# Patient Record
Sex: FEMALE | Race: WHITE | NOT HISPANIC OR LATINO | Employment: FULL TIME | URBAN - METROPOLITAN AREA
[De-identification: names, ages, dates, MRNs, and addresses within clinical notes are randomized per-mention and may not be internally consistent; named-entity substitution may affect disease eponyms.]

---

## 2017-01-05 ENCOUNTER — OFFICE VISIT (OUTPATIENT)
Dept: FAMILY MEDICINE | Facility: CLINIC | Age: 42
End: 2017-01-05
Payer: COMMERCIAL

## 2017-01-05 VITALS
HEART RATE: 103 BPM | DIASTOLIC BLOOD PRESSURE: 88 MMHG | TEMPERATURE: 99 F | BODY MASS INDEX: 30.88 KG/M2 | WEIGHT: 196.75 LBS | HEIGHT: 67 IN | OXYGEN SATURATION: 95 % | SYSTOLIC BLOOD PRESSURE: 120 MMHG

## 2017-01-05 DIAGNOSIS — H69.93 EUSTACHIAN TUBE DYSFUNCTION, BILATERAL: ICD-10-CM

## 2017-01-05 DIAGNOSIS — H72.91 RUPTURED TYMPANIC MEMBRANE, RIGHT: Primary | ICD-10-CM

## 2017-01-05 PROCEDURE — 1159F MED LIST DOCD IN RCRD: CPT | Mod: S$GLB,,, | Performed by: NURSE PRACTITIONER

## 2017-01-05 PROCEDURE — 99999 PR PBB SHADOW E&M-EST. PATIENT-LVL III: CPT | Mod: PBBFAC,,, | Performed by: NURSE PRACTITIONER

## 2017-01-05 PROCEDURE — 99213 OFFICE O/P EST LOW 20 MIN: CPT | Mod: S$GLB,,, | Performed by: NURSE PRACTITIONER

## 2017-01-05 RX ORDER — METHYLPREDNISOLONE 4 MG/1
TABLET ORAL
Qty: 21 TABLET | Refills: 0 | Status: SHIPPED | OUTPATIENT
Start: 2017-01-05 | End: 2017-01-20 | Stop reason: ALTCHOICE

## 2017-01-05 NOTE — MR AVS SNAPSHOT
[facility]  [address]  [place] [address]  Phone: [phone]  Fax: [phone]                  Ivory Cade   2017 1:40 PM   Office Visit    Description:  Female : 1975   Provider:  Claire Serra NP   Department:  [facility]           Reason for Visit     Ear Injury           Diagnoses this Visit        Comments    Ruptured tympanic membrane, right    -  Primary     Eustachian tube dysfunction, bilateral                To Do List           Future Appointments        Provider Department Dept Phone    2017 10:00 AM AUDIO, DAHIANA [place] - Audiology [phone]    2017 11:00 AM Lena Graf NP [facility] [phone]    2017 1:40 PM Elaine Babb MD [facility] [phone]      Goals (5 Years of Data)     None       These Medications        Disp Refills Start End    methylPREDNISolone (MEDROL DOSEPACK) 4 mg tablet 21 tablet 0 2017     Take as directed    Pharmacy: [facility]/pharmacy #[id number] - [place], [place] - [address] Ph #: [phone]         [facility]     [facility] Nurse Care Line -  Assistance  Registered nurses in the [facility] Center provide clinical advisement, health education, appointment booking, and other advisory services.  Call for this free service at [phone].             Medications           Message regarding Medications     Verify the changes and/or additions to your medication regime listed below are the same as discussed with your clinician today.  If any of these changes or additions are incorrect, please notify your healthcare provider.        START taking these NEW medications        Refills    methylPREDNISolone (MEDROL DOSEPACK) 4 mg tablet 0    Sig: Take as directed    Class: Normal      STOP taking these medications     hydrocodone-acetaminophen 5-325mg (NORCO) 5-325 mg per tablet Take 1 tablet by mouth every 6 (six) hours as  "needed for Pain.    phentermine (ADIPEX-P) 37.5 mg tablet Take 18.75 mg by mouth 2 (two) times daily as needed.    senna-docusate 8.6-50 mg (PERICOLACE) 8.6-50 mg per tablet Take 1 tablet by mouth 2 (two) times daily.    acyclovir 5% (ZOVIRAX) 5 % Crea Apply topically 5 (five) times daily.           Verify that the below list of medications is an accurate representation of the medications you are currently taking.  If none reported, the list may be blank. If incorrect, please contact your healthcare provider. Carry this list with you in case of emergency.           Current Medications     albuterol 90 mcg/actuation inhaler Inhale 2 puffs into the lungs every 6 (six) hours as needed for Wheezing.    beclomethasone (QVAR) 80 mcg/actuation Aero Inhale 2 puffs into the lungs 2 (two) times daily.    citalopram (CELEXA) 40 MG tablet TAKE 1 TABLET (40 MG TOTAL) BY MOUTH ONCE DAILY.    methylPREDNISolone (MEDROL DOSEPACK) 4 mg tablet Take as directed           Clinical Reference Information           Vital Signs - Last Recorded  Most recent update: 1/5/2017  1:49 PM by Jacklyn Frances    BP Pulse Temp Ht Wt LMP    120/88 (BP Location: Left arm, Patient Position: Sitting, BP Method: Manual) 103 98.6 °F (37 °C) (Oral) 5' 7" (1.702 m) 89.3 kg (196 lb 12.2 oz) 11/10/2016    SpO2 BMI             95% 30.82 kg/m2         Blood Pressure          Most Recent Value    BP  120/88      Allergies as of 1/5/2017     Penicillins      Immunizations Administered on Date of Encounter - 1/5/2017     None      Orders Placed During Today's Visit      Normal Orders This Visit    Ambulatory referral to ENT       Smoking Cessation     If you would like to quit smoking:   You may be eligible for free services if you are a [place] resident and started smoking cigarettes before September 1, 1988.  Call the Smoking Cessation Trust (SCT) toll free at ([phone] or [phone].   Call 5-147-QUIT-NOW if you do not meet the above criteria.       "

## 2017-01-05 NOTE — PROGRESS NOTES
Subjective:       Patient ID: Ivory Cade is a 41 y.o. female.    Chief Complaint: Ear Injury    HPI     Patient presents to clinic for f/u of a right, ruptured tympanic membrane. Sx occurred at flying while she was experiencing URI complaints. She was evaluated at a local  on 12/18/2016 and told that she had a ruptured tympanic membrane and to f/u with ENT. She was also placed Cefdinir for sinusitis. She feels as though her sx have waxed and waned since completing the abx. Still notes sinus pressure, headache, cough and chest congestion. Reports subjective feelings of fever, although her temperature has been within normal ranges.     Review of Systems   Constitutional: Negative for activity change, chills, fever and unexpected weight change.   HENT: Positive for ear discharge (scant - clear/white) and ear pain (right - mild). Negative for hearing loss, rhinorrhea and trouble swallowing.    Eyes: Negative for discharge and visual disturbance.   Respiratory: Positive for cough (occasionally productive with green sputum .). Negative for chest tightness and wheezing.    Cardiovascular: Negative for chest pain and palpitations.   Gastrointestinal: Negative for blood in stool, constipation, diarrhea and vomiting.   Endocrine: Negative for polydipsia and polyuria.   Genitourinary: Negative for difficulty urinating, dysuria, hematuria and menstrual problem.   Musculoskeletal: Negative for arthralgias and joint swelling.   Neurological: Positive for headaches. Negative for weakness.   Psychiatric/Behavioral: Negative for confusion and dysphoric mood.       Objective:      Physical Exam   Constitutional: She is oriented to person, place, and time. She appears well-developed and well-nourished.   HENT:   Head: Normocephalic and atraumatic.   Right Ear: Tympanic membrane is perforated. Tympanic membrane is not injected, not scarred and not erythematous. A middle ear effusion is present.   Left Ear: Tympanic membrane is  not erythematous. A middle ear effusion is present.   Nose: Mucosal edema and rhinorrhea present. Right sinus exhibits no maxillary sinus tenderness and no frontal sinus tenderness. Left sinus exhibits no maxillary sinus tenderness and no frontal sinus tenderness.   Mouth/Throat: Uvula is midline and mucous membranes are normal. No oropharyngeal exudate or posterior oropharyngeal erythema.   Eyes: Pupils are equal, round, and reactive to light. Right eye exhibits no discharge. Left eye exhibits no discharge.   Neck: Normal range of motion. Neck supple.   Cardiovascular: Normal rate, regular rhythm and normal heart sounds.    Pulmonary/Chest: Breath sounds normal. No respiratory distress. She has no wheezes. She has no rales.   Musculoskeletal: Normal range of motion. She exhibits no edema.   Neurological: She is alert and oriented to person, place, and time. No cranial nerve deficit. Coordination normal.   Skin: Skin is warm and dry. No rash noted. No erythema.   Psychiatric: She has a normal mood and affect. Her behavior is normal.   Nursing note and vitals reviewed.      Assessment:       1. Ruptured tympanic membrane, right    2. Eustachian tube dysfunction, bilateral        Plan:   Ivory was seen today for ear injury.    Diagnoses and all orders for this visit:    Ruptured tympanic membrane, right  -     Ambulatory referral to ENT  Healing well. No evidence of active infection.   F/u with ENT in 2-4 weeks with audiology eval.     Eustachian tube dysfunction, bilateral  -     methylPREDNISolone (MEDROL DOSEPACK) 4 mg tablet; Take as directed  Flonase and antihistamine. Oral steroids if sx worsen.

## 2017-01-10 ENCOUNTER — PATIENT MESSAGE (OUTPATIENT)
Dept: FAMILY MEDICINE | Facility: CLINIC | Age: 42
End: 2017-01-10

## 2017-01-10 RX ORDER — SULFAMETHOXAZOLE AND TRIMETHOPRIM 800; 160 MG/1; MG/1
1 TABLET ORAL 2 TIMES DAILY
Qty: 20 TABLET | Refills: 0 | Status: SHIPPED | OUTPATIENT
Start: 2017-01-10 | End: 2017-01-20 | Stop reason: ALTCHOICE

## 2017-01-10 RX ORDER — CIPROFLOXACIN AND DEXAMETHASONE 3; 1 MG/ML; MG/ML
4 SUSPENSION/ DROPS AURICULAR (OTIC) 2 TIMES DAILY
Qty: 7.5 ML | Refills: 0 | Status: SHIPPED | OUTPATIENT
Start: 2017-01-10 | End: 2017-01-20 | Stop reason: ALTCHOICE

## 2017-01-10 NOTE — TELEPHONE ENCOUNTER
Bactrim and ciprodex bid. Keep ear dry - do not submerge in water or rinse with other solutions. Ensure using flonase otc.

## 2017-01-10 NOTE — TELEPHONE ENCOUNTER
I came in last week and the NP and PA both looked at my ear and said it was still perforated. She didn't want to give me antibiotic if not necessary, but prescribed medrol dose pack if needed. I'm taking the steroids and am feeling worse. Just a low grade fever. More fluid draining from ear with perforation and very dizzy. I can't get in to see ENT and get audiology screening until the 20th. I didn't want to take anymore antibiotics, but now I'm thinking another round of antibiotics may be a good idea. I took Cefdinir x10 days 2 weeks ago. Let me know what I should do. Thanks in advance.

## 2017-01-11 ENCOUNTER — PATIENT OUTREACH (OUTPATIENT)
Dept: ADMINISTRATIVE | Facility: HOSPITAL | Age: 42
End: 2017-01-11

## 2017-01-11 DIAGNOSIS — J45.20 RAD (REACTIVE AIRWAY DISEASE), MILD INTERMITTENT, UNCOMPLICATED: ICD-10-CM

## 2017-01-11 RX ORDER — ALBUTEROL SULFATE 90 UG/1
2 AEROSOL, METERED RESPIRATORY (INHALATION) EVERY 6 HOURS PRN
Qty: 18 G | Refills: 5 | Status: SHIPPED | OUTPATIENT
Start: 2017-01-11 | End: 2019-01-16 | Stop reason: SDUPTHER

## 2017-01-20 ENCOUNTER — INITIAL CONSULT (OUTPATIENT)
Dept: OTOLARYNGOLOGY | Facility: CLINIC | Age: 42
End: 2017-01-20
Payer: COMMERCIAL

## 2017-01-20 ENCOUNTER — CLINICAL SUPPORT (OUTPATIENT)
Dept: AUDIOLOGY | Facility: CLINIC | Age: 42
End: 2017-01-20
Payer: COMMERCIAL

## 2017-01-20 VITALS
HEART RATE: 90 BPM | HEIGHT: 67 IN | SYSTOLIC BLOOD PRESSURE: 135 MMHG | BODY MASS INDEX: 31.76 KG/M2 | DIASTOLIC BLOOD PRESSURE: 84 MMHG | WEIGHT: 202.38 LBS

## 2017-01-20 DIAGNOSIS — H93.8X1 EAR FULLNESS, RIGHT: Primary | ICD-10-CM

## 2017-01-20 DIAGNOSIS — H93.13 TINNITUS, BILATERAL: ICD-10-CM

## 2017-01-20 PROCEDURE — 99999 PR PBB SHADOW E&M-EST. PATIENT-LVL III: CPT | Mod: PBBFAC,,, | Performed by: NURSE PRACTITIONER

## 2017-01-20 PROCEDURE — 99203 OFFICE O/P NEW LOW 30 MIN: CPT | Mod: S$GLB,,, | Performed by: NURSE PRACTITIONER

## 2017-01-20 PROCEDURE — 1159F MED LIST DOCD IN RCRD: CPT | Mod: S$GLB,,, | Performed by: NURSE PRACTITIONER

## 2017-01-20 PROCEDURE — 92567 TYMPANOMETRY: CPT | Mod: S$GLB,,, | Performed by: AUDIOLOGIST

## 2017-01-20 NOTE — PROGRESS NOTES
Tympanometry completed per order from Lena Graf NP.    Type A tympanogram obtained AS and Type Ad tympanogram obtained AD.     Patient referred back to nurse practitioner for follow-up evaluatuion.

## 2017-01-20 NOTE — MR AVS SNAPSHOT
[facility]  [address] [facility] [address]  [facility] [address]  Phone: [phone]  Fax: [phone]                  Ivoyr Cade   2017 11:00 AM   Initial consult    Description:  Female : 1975   Provider:  Lena Graf NP   Department:  [facility] - ENT           Reason for Visit     perforated ear drum     Fluid in ears     Ear Drainage                To Do List           Future Appointments        Provider Department Dept Phone    2017 1:40 PM Elaine Babb MD [facility] [phone]      Goals (5 Years of Data)     None      [facility]     [facility] On Call Nurse Care [facility] -  Assistance  Registered nurses in the [facility] On Call Center provide clinical advisement, health education, appointment booking, and other advisory services.  Call for this free service at [phone].             Medications           Message regarding Medications     Verify the changes and/or additions to your medication regime listed below are the same as discussed with your clinician today.  If any of these changes or additions are incorrect, please notify your healthcare provider.        STOP taking these medications     methylPREDNISolone (MEDROL DOSEPACK) 4 mg tablet Take as directed    sulfamethoxazole-trimethoprim 800-160mg (BACTRIM DS) 800-160 mg Tab Take 1 tablet by mouth 2 (two) times daily.    ciprofloxacin-dexamethasone 0.3-0.1% (CIPRODEX) 0.3-0.1 % DrpS Place 4 drops into the right ear 2 (two) times daily.           Verify that the below list of medications is an accurate representation of the medications you are currently taking.  If none reported, the list may be blank. If incorrect, please contact your healthcare provider. Carry this list with you in case of emergency.           Current Medications     albuterol 90 mcg/actuation inhaler Inhale 2 puffs into the lungs every 6 (six) hours as needed for Wheezing.    beclomethasone (QVAR) 80 mcg/actuation Aero Inhale 2  "puffs into the lungs 2 (two) times daily.    citalopram (CELEXA) 40 MG tablet TAKE 1 TABLET (40 MG TOTAL) BY MOUTH ONCE DAILY.           Clinical Reference Information           Vital Signs - Last Recorded  Most recent update: 1/20/2017 10:30 AM by Mary Stubbs    BP Pulse Ht Wt LMP BMI    135/84 90 5' 7" (1.702 m) 91.8 kg (202 lb 6.1 oz) 11/10/2016 31.7 kg/m2      Blood Pressure          Most Recent Value    BP  135/84      Allergies as of 1/20/2017     Penicillins      Immunizations Administered on Date of Encounter - 1/20/2017     None      Instructions    When flying:  Take Sudafed 60 mg 1 hour prior to flying. Afrin nasal spray 30 minutes before take-off. Chew gum/pop ears gently when taking off and coming down. Drink water. Use "Airplanes" plugs designed for flying.     DIFFERENT TYPES OF NASAL SPRAYS:  · Flonase (steroid spray) is best for stuffy, pressure, fullness.  · Astelin (antihistamine spray) is best for itchy, drippy, sneezy.    Nasal spray instructions:  Blow nose first gently to clean. Keep chin level with the floor (do not tilt head forward or back). Insert nasal spray taking caution to direct it AWAY from the middle wall inside the nose (septum) to avoid irritating nasal septum which could cause nosebleed.  Do not tilt spray up but rather flat and out along the roof of your mouth to spray. Angle the tip of the spray out slightly toward the direction of the ears; then spray. Do not take quick vigorous sniff but rather slow gentle inhalation while waiting for medication to absorb into nasal passages. Then administer second spray in same way.     EUSTACHIAN TUBE INSTRUCTIONS:  Nasal valsalva:  Pinch nose and with closed mouth try to "pop" air into ears through the back of the nose. Attempt this several times a day. The more popping you have, the more likely the fluid will resolve.     Ponaris Nasal Emollient is used for the relief of: nasal congestion due to colds, nasal irritation, allergy " exacerbations, nasal crusting. Specifically prepared iodized organic oils of pine, eucalyptus, peppermint, cajeput, and cottonseed. To order Ponaris: ask your pharmacist to order it for you or we carry it in our pharmacy downstairs on the first floor.          Smoking Cessation     If you would like to quit smoking:   You may be eligible for free services if you are a [place] resident and started smoking cigarettes before September 1, 1988.  Call the Smoking Cessation Trust (SCT) toll free at ([phone] or [phone].   Call 6-473-QUIT-NOW if you do not meet the above criteria.

## 2017-01-20 NOTE — PATIENT INSTRUCTIONS
"When flying:  Take Sudafed 60 mg 1 hour prior to flying. Afrin nasal spray 30 minutes before take-off. Chew gum/pop ears gently when taking off and coming down. Drink water. Use "Airplanes" plugs designed for flying.     DIFFERENT TYPES OF NASAL SPRAYS:  · Flonase (steroid spray) is best for stuffy, pressure, fullness.  · Astelin (antihistamine spray) is best for itchy, drippy, sneezy.    Nasal spray instructions:  Blow nose first gently to clean. Keep chin level with the floor (do not tilt head forward or back). Insert nasal spray taking caution to direct it AWAY from the middle wall inside the nose (septum) to avoid irritating nasal septum which could cause nosebleed.  Do not tilt spray up but rather flat and out along the roof of your mouth to spray. Angle the tip of the spray out slightly toward the direction of the ears; then spray. Do not take quick vigorous sniff but rather slow gentle inhalation while waiting for medication to absorb into nasal passages. Then administer second spray in same way.     EUSTACHIAN TUBE INSTRUCTIONS:  Nasal valsalva:  Pinch nose and with closed mouth try to "pop" air into ears through the back of the nose. Attempt this several times a day. The more popping you have, the more likely the fluid will resolve.     Ponaris Nasal Emollient is used for the relief of: nasal congestion due to colds, nasal irritation, allergy exacerbations, nasal crusting. Specifically prepared iodized organic oils of pine, eucalyptus, peppermint, cajeput, and cottonseed. To order Ponaris: ask your pharmacist to order it for you or we carry it in our pharmacy downstairs on the first floor.     "

## 2017-01-20 NOTE — PROGRESS NOTES
Subjective:       Patient ID: Ivory Cade is a 41 y.o. female.    Chief Complaint: perforated ear drum; Fluid in ears; and Ear Drainage    HPI   Patient developed a cold during December and had to fly while sick. Her right ear stopped up and would not clear. She went to [facility] where she was told possible right TM perforation. She was seen by KELSI Serra on 1/5/17 with bilateral TAZ and right TM perforation; given steroids. Patient reports she did have some clear otorrhea AD. On 1/10/17 Bactrim and Ciprodex were called in. She had to discontinue the Bactrim due to GI side effects and also discontinued the Ciprodex due to lack of perceived need. She reports feeling much better but would like to ensure right TM is fine. She has been noticing some periodic tinnitus, alternates ears.     Review of Systems   Constitutional: Negative.    HENT: Positive for tinnitus. Negative for hearing loss.    Eyes: Negative.    Respiratory: Negative.    Cardiovascular: Negative.    Gastrointestinal: Negative.    Musculoskeletal: Negative.    Skin: Negative.    Neurological: Negative.    Hematological: Negative.    Psychiatric/Behavioral: Negative.        Objective:      Physical Exam   Constitutional: She is oriented to person, place, and time. Vital signs are normal. She appears well-developed and well-nourished. She is cooperative. She does not appear ill. No distress.   HENT:   Head: Normocephalic and atraumatic.   Right Ear: Hearing, tympanic membrane, external ear and ear canal normal. Tympanic membrane is not erythematous. No middle ear effusion.   Left Ear: Hearing, tympanic membrane, external ear and ear canal normal. Tympanic membrane is not erythematous.  No middle ear effusion.   Nose: Nose normal. No mucosal edema or rhinorrhea. Right sinus exhibits no maxillary sinus tenderness and no frontal sinus tenderness. Left sinus exhibits no maxillary sinus tenderness and no frontal sinus tenderness.   Mouth/Throat: Uvula is  "midline, oropharynx is clear and moist and mucous membranes are normal. Mucous membranes are not pale, not dry and not cyanotic. No oral lesions. No oropharyngeal exudate, posterior oropharyngeal edema or posterior oropharyngeal erythema.   Type "A" tympanogram consistent with well-pneumatized mesotympanum and absence of middle ear effusions AU.   Eyes: Conjunctivae, EOM and lids are normal. Pupils are equal, round, and reactive to light. Right eye exhibits no discharge. Left eye exhibits no discharge. No scleral icterus.   Neck: Trachea normal and normal range of motion. Neck supple. No tracheal deviation present. No thyroid mass and no thyromegaly present.   Cardiovascular: Normal rate.    Pulmonary/Chest: Effort normal. No stridor. No respiratory distress. She has no wheezes.   Musculoskeletal: Normal range of motion.   Lymphadenopathy:        Head (right side): No submental, no submandibular, no tonsillar, no preauricular and no posterior auricular adenopathy present.        Head (left side): No submental, no submandibular, no tonsillar, no preauricular and no posterior auricular adenopathy present.     She has no cervical adenopathy.        Right cervical: No superficial cervical and no posterior cervical adenopathy present.       Left cervical: No superficial cervical and no posterior cervical adenopathy present.   Neurological: She is alert and oriented to person, place, and time. She has normal strength. Coordination and gait normal.   Skin: Skin is warm, dry and intact. No lesion and no rash noted. She is not diaphoretic. No cyanosis. No pallor.   Psychiatric: She has a normal mood and affect. Her speech is normal and behavior is normal. Judgment and thought content normal. Cognition and memory are normal.   Nursing note and vitals reviewed.      Assessment:     Negative aural exam      Plan:     Reassured no perforation or middle ear effusion. Normal Type "A" tympanograms.    Instructions given for " flying  ETD protocol given and discussed  Discussed transient tinnitus is universal and does not represent pathology. If persistent or accompanied by hearing loss, return for full audiogram.

## 2017-01-26 ENCOUNTER — OFFICE VISIT (OUTPATIENT)
Dept: FAMILY MEDICINE | Facility: CLINIC | Age: 42
End: 2017-01-26
Payer: COMMERCIAL

## 2017-01-26 VITALS
HEIGHT: 67 IN | BODY MASS INDEX: 31.78 KG/M2 | HEART RATE: 84 BPM | SYSTOLIC BLOOD PRESSURE: 122 MMHG | DIASTOLIC BLOOD PRESSURE: 74 MMHG | WEIGHT: 202.5 LBS | TEMPERATURE: 98 F

## 2017-01-26 DIAGNOSIS — E07.89 THYROID FULLNESS: ICD-10-CM

## 2017-01-26 DIAGNOSIS — R06.83 SNORING: ICD-10-CM

## 2017-01-26 DIAGNOSIS — Z00.00 ROUTINE GENERAL MEDICAL EXAMINATION AT A HEALTH CARE FACILITY: Primary | ICD-10-CM

## 2017-01-26 DIAGNOSIS — Z15.89 MTHFR MUTATION: ICD-10-CM

## 2017-01-26 DIAGNOSIS — D52.9 ANEMIA DUE TO FOLIC ACID DEFICIENCY, UNSPECIFIED DEFICIENCY TYPE: ICD-10-CM

## 2017-01-26 DIAGNOSIS — Z23 IMMUNIZATION DUE: ICD-10-CM

## 2017-01-26 DIAGNOSIS — R53.83 FATIGUE, UNSPECIFIED TYPE: ICD-10-CM

## 2017-01-26 DIAGNOSIS — E78.5 HYPERLIPIDEMIA, UNSPECIFIED HYPERLIPIDEMIA TYPE: ICD-10-CM

## 2017-01-26 DIAGNOSIS — J45.30 MILD PERSISTENT ASTHMA WITHOUT COMPLICATION: ICD-10-CM

## 2017-01-26 PROBLEM — J45.909 ASTHMA: Status: ACTIVE | Noted: 2017-01-26

## 2017-01-26 PROCEDURE — 90732 PPSV23 VACC 2 YRS+ SUBQ/IM: CPT | Mod: S$GLB,,, | Performed by: FAMILY MEDICINE

## 2017-01-26 PROCEDURE — 99999 PR PBB SHADOW E&M-EST. PATIENT-LVL III: CPT | Mod: PBBFAC,,, | Performed by: FAMILY MEDICINE

## 2017-01-26 PROCEDURE — 90471 IMMUNIZATION ADMIN: CPT | Mod: S$GLB,,, | Performed by: FAMILY MEDICINE

## 2017-01-26 PROCEDURE — 99396 PREV VISIT EST AGE 40-64: CPT | Mod: 25,S$GLB,, | Performed by: FAMILY MEDICINE

## 2017-01-26 NOTE — MR AVS SNAPSHOT
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, unspecified deficiency type         Thyroid fullness         Hyperlipidemia, unspecified hyperlipidemia type         Fatigue, unspecified type         Mild persistent asthma without complication         MTHFR mutation                To Do List           Future Appointments        Provider Department Dept Phone    2017 1:15 PM [facility] [phone]    2017 8:45 AM [facility], NURSE [facility] [phone]      Goals (5 Years of Data)     None      [facility]     [facility] On [facility] Nurse [facility] -  Assistance  Registered nurses in the [facility] Center provide clinical advisement, health education, appointment booking, and other advisory services.  Call for this free service at [phone].             Medications           Message regarding Medications     Verify the changes and/or additions to your medication regime listed below are the same as discussed with your clinician today.  If any of these changes or additions are incorrect, please notify your healthcare provider.        STOP taking these medications     citalopram (CELEXA) 40 MG tablet TAKE 1 TABLET (40 MG TOTAL) BY MOUTH ONCE DAILY.           Verify that the below list of medications is an accurate representation of the medications you are currently taking.  If none reported, the list may be blank. If incorrect, please contact your  "healthcare provider. Carry this list with you in case of emergency.           Current Medications     albuterol 90 mcg/actuation inhaler Inhale 2 puffs into the lungs every 6 (six) hours as needed for Wheezing.    beclomethasone (QVAR) 80 mcg/actuation Aero Inhale 2 puffs into the lungs 2 (two) times daily.           Clinical Reference Information           Vital Signs - Last Recorded  Most recent update: 1/26/2017  1:48 PM by Miladis Montejo LPN    BP Pulse Temp Ht Wt LMP    122/74 (BP Location: Left arm, Patient Position: Sitting, BP Method: Manual) 84 98.4 °F (36.9 °C) 5' 7" (1.702 m) 91.8 kg (202 lb 7.9 oz) 11/10/2016    BMI                31.71 kg/m2          Blood Pressure          Most Recent Value    BP  122/74      Allergies as of 1/26/2017     Penicillins      Immunizations Administered on Date of Encounter - 1/26/2017     Name Date Dose VIS Date Route    Pneumococcal Polysaccharide - 23 Valent 1/26/2017 0.5 mL 4/24/2015 Intramuscular      Orders Placed During Today's Visit      Normal Orders This Visit    Ambulatory consult to Sleep Disorders     Pneumococcal Polysaccharide Vaccine (23 Valent) (SQ/IM)     Future Labs/Procedures Expected by Expires    CBC auto differential  1/26/2017 3/27/2018    Comprehensive metabolic panel  1/26/2017 3/27/2018    Folate  1/26/2017 3/27/2018    Hemoglobin A1c  1/26/2017 3/27/2018    Lipid panel  1/26/2017 3/27/2018    TSH  1/26/2017 3/27/2018    US Soft Tissue Head Neck Thyroid  1/26/2017 1/26/2018    Vitamin B12  1/26/2017 3/27/2018    Vitamin D  1/26/2017 3/27/2018    Iron and TIBC  3/9/2017 3/27/2018      Smoking Cessation     If you would like to quit smoking:   You may be eligible for free services if you are a [place] resident and started smoking cigarettes before September 1, 1988.  Call the Smoking Cessation Trust (SCT) toll free at [phone] or [phone]) [phone].   Call -800-QUIT-NOW if you do not meet the above criteria.            "

## 2017-01-26 NOTE — PROGRESS NOTES
Subjective:       Patient ID: Ivory Cade is a 41 y.o. female.    Chief Complaint: Annual Exam (pt has recently weened herself off celexa. )    HPI   Ms. Cade is a 42 yo female who presented today for routine f/u.  She is doing well and has recovered from her recent surgery (robot-assisted hysterectomy w/ left salpingo-oophorectomy).  Pt c/o chronic fatigue, which pre-dates her surgery and anemia dx.  Due to body habitus and snoring symptoms, it was explained to the pt that a full workup for the cause of her fatigue would include a sleep study to test for UMER.  Other health maintenance was explained to the pt and she expressed understanding.    Review of Systems   Constitutional: Positive for activity change and fatigue. Negative for unexpected weight change.   HENT: Negative for congestion, hearing loss, postnasal drip, rhinorrhea and sinus pressure.         Multiple rounds of abx for sinus inf tx in UC.   Eyes: Negative for discharge and visual disturbance.   Respiratory: Negative for apnea (+snoring), cough, chest tightness, shortness of breath and wheezing.    Cardiovascular: Negative for chest pain, palpitations and leg swelling.   Gastrointestinal: Negative for abdominal pain, blood in stool, constipation, diarrhea and vomiting.        Occ gerd c/o - attributed to triggers.   Endocrine: Negative for polydipsia and polyuria.   Genitourinary: Negative for difficulty urinating, dysuria, hematuria and menstrual problem.   Musculoskeletal: Negative for arthralgias and joint swelling.   Neurological: Negative for dizziness, weakness, light-headedness and headaches.   Psychiatric/Behavioral: Negative for confusion, dysphoric mood and sleep disturbance. The patient is not nervous/anxious.        Objective:      Physical Exam   Constitutional: She is oriented to person, place, and time. She appears well-developed and well-nourished. No distress.   HENT:   Head: Normocephalic and atraumatic.   Right Ear: External  ear normal.   Left Ear: External ear normal.   Eyes: EOM are normal. Pupils are equal, round, and reactive to light. No scleral icterus.   Neck: Normal range of motion. Neck supple. Thyromegaly present.   Mildly enlarged thyroid, more fullness appreciated on left side   Cardiovascular: Normal rate, regular rhythm, normal heart sounds and intact distal pulses.    Pulmonary/Chest: Effort normal and breath sounds normal. No respiratory distress. She has no wheezes. She has no rales. She exhibits no tenderness.   Abdominal: Soft. Bowel sounds are normal. She exhibits no distension and no mass. There is no tenderness. There is no rebound and no guarding.   Musculoskeletal: Normal range of motion. She exhibits no edema, tenderness or deformity.   Lymphadenopathy:     She has no cervical adenopathy.   Neurological: She is alert and oriented to person, place, and time. She has normal reflexes. No cranial nerve deficit. Coordination normal.   Skin: Skin is warm. No rash noted. She is not diaphoretic. No erythema. No pallor.   Psychiatric: She has a normal mood and affect. Her behavior is normal.   Nursing note and vitals reviewed.        Routine general medical examination at a health care facility  -     CBC auto differential; Future; Expected date: 1/26/17  -     Comprehensive metabolic panel; Future; Expected date: 1/26/17  -     Hemoglobin A1c; Future; Expected date: 1/26/17  -     Lipid panel; Future; Expected date: 1/26/17  -     TSH; Future; Expected date: 1/26/17  -     Vitamin D; Future; Expected date: 1/26/17  -     Vitamin B12; Future; Expected date: 1/26/17  -     Iron and TIBC; Future; Expected date: 3/9/17  -     Folate; Future; Expected date: 1/26/17  Anticipatory guidance reviewed.  Fatigue  Workup initiated to include labs, sleep eval. Consider snri.  Immunization due  -     Pneumococcal Polysaccharide Vaccine (23 Valent) (SQ/IM)    Snoring  -     Ambulatory consult to Sleep Disorders  For review.  Anemia  due to folic acid deficiency, unspecified deficiency type  -     Iron and TIBC; Future; Expected date: 3/9/17  -     Folate; Future; Expected date: 1/26/17  Prior MTHFR dx.  Thyroid fullness  -     US Soft Tissue Head Neck Thyroid; Future; Expected date: 1/26/17  Incidental finding. Review imaging.  Asthma  Controlled, cont regimen.  MTHFR  Update folate level, discussed supplementation.    The risks, benefits, and limitations of all medications and interventions were explained, and pt expressed understanding.

## 2017-01-30 ENCOUNTER — HOSPITAL ENCOUNTER (OUTPATIENT)
Dept: RADIOLOGY | Facility: HOSPITAL | Age: 42
Discharge: HOME | End: 2017-01-30
Attending: FAMILY MEDICINE
Payer: COMMERCIAL

## 2017-01-30 DIAGNOSIS — E07.89 THYROID FULLNESS: ICD-10-CM

## 2017-01-30 PROCEDURE — 76536 US EXAM OF HEAD AND NECK: CPT | Mod: 26,,, | Performed by: RADIOLOGY

## 2017-01-30 PROCEDURE — 76536 US EXAM OF HEAD AND NECK: CPT | Mod: TC,PO

## 2017-02-15 ENCOUNTER — PATIENT MESSAGE (OUTPATIENT)
Dept: FAMILY MEDICINE | Facility: CLINIC | Age: 42
End: 2017-02-15

## 2017-02-17 ENCOUNTER — CLINICAL SUPPORT (OUTPATIENT)
Dept: FAMILY MEDICINE | Facility: CLINIC | Age: 42
End: 2017-02-17
Payer: COMMERCIAL

## 2017-02-17 DIAGNOSIS — Z00.00 ROUTINE GENERAL MEDICAL EXAMINATION AT A HEALTH CARE FACILITY: ICD-10-CM

## 2017-02-17 DIAGNOSIS — D52.9 ANEMIA DUE TO FOLIC ACID DEFICIENCY, UNSPECIFIED DEFICIENCY TYPE: ICD-10-CM

## 2017-02-17 LAB
25(OH)D3+25(OH)D2 SERPL-MCNC: 21 NG/ML
ALBUMIN SERPL BCP-MCNC: 3.8 G/DL
ALP SERPL-CCNC: 67 U/L
ALT SERPL W/O P-5'-P-CCNC: 18 U/L
ANION GAP SERPL CALC-SCNC: 10 MMOL/L
AST SERPL-CCNC: 17 U/L
BASOPHILS # BLD AUTO: 0.04 K/UL
BASOPHILS NFR BLD: 0.6 %
BILIRUB SERPL-MCNC: 0.3 MG/DL
BUN SERPL-MCNC: 15 MG/DL
CALCIUM SERPL-MCNC: 9.8 MG/DL
CHLORIDE SERPL-SCNC: 104 MMOL/L
CHOLEST/HDLC SERPL: 6 {RATIO}
CO2 SERPL-SCNC: 24 MMOL/L
CREAT SERPL-MCNC: 0.9 MG/DL
DIFFERENTIAL METHOD: ABNORMAL
EOSINOPHIL # BLD AUTO: 0.6 K/UL
EOSINOPHIL NFR BLD: 8.4 %
ERYTHROCYTE [DISTWIDTH] IN BLOOD BY AUTOMATED COUNT: 13.1 %
EST. GFR  (AFRICAN AMERICAN): >60 ML/MIN/1.73 M^2
EST. GFR  (NON AFRICAN AMERICAN): >60 ML/MIN/1.73 M^2
FOLATE SERPL-MCNC: 13.5 NG/ML
GLUCOSE SERPL-MCNC: 101 MG/DL
HCT VFR BLD AUTO: 38.6 %
HDL/CHOLESTEROL RATIO: 16.8 %
HDLC SERPL-MCNC: 238 MG/DL
HDLC SERPL-MCNC: 40 MG/DL
HGB BLD-MCNC: 12.7 G/DL
IRON SERPL-MCNC: 101 UG/DL
LDLC SERPL CALC-MCNC: 134 MG/DL
LYMPHOCYTES # BLD AUTO: 2.8 K/UL
LYMPHOCYTES NFR BLD: 38.7 %
MCH RBC QN AUTO: 30.5 PG
MCHC RBC AUTO-ENTMCNC: 32.9 %
MCV RBC AUTO: 93 FL
MONOCYTES # BLD AUTO: 0.4 K/UL
MONOCYTES NFR BLD: 5.7 %
NEUTROPHILS # BLD AUTO: 3.3 K/UL
NEUTROPHILS NFR BLD: 45.8 %
NONHDLC SERPL-MCNC: 198 MG/DL
PLATELET # BLD AUTO: 216 K/UL
PMV BLD AUTO: 11.3 FL
POTASSIUM SERPL-SCNC: 4.9 MMOL/L
PROT SERPL-MCNC: 7.2 G/DL
RBC # BLD AUTO: 4.17 M/UL
SATURATED IRON: 24 %
SODIUM SERPL-SCNC: 138 MMOL/L
TOTAL IRON BINDING CAPACITY: 417 UG/DL
TRANSFERRIN SERPL-MCNC: 282 MG/DL
TRIGL SERPL-MCNC: 320 MG/DL
TSH SERPL DL<=0.005 MIU/L-ACNC: 2.92 UIU/ML
VIT B12 SERPL-MCNC: 612 PG/ML
WBC # BLD AUTO: 7.23 K/UL

## 2017-02-17 PROCEDURE — 36415 COLL VENOUS BLD VENIPUNCTURE: CPT | Mod: S$GLB,,, | Performed by: FAMILY MEDICINE

## 2017-02-17 PROCEDURE — 82607 VITAMIN B-12: CPT

## 2017-02-17 PROCEDURE — 85025 COMPLETE CBC W/AUTO DIFF WBC: CPT

## 2017-02-17 PROCEDURE — 82746 ASSAY OF FOLIC ACID SERUM: CPT

## 2017-02-17 PROCEDURE — 36415 COLL VENOUS BLD VENIPUNCTURE: CPT

## 2017-02-17 PROCEDURE — 83036 HEMOGLOBIN GLYCOSYLATED A1C: CPT

## 2017-02-17 PROCEDURE — 84443 ASSAY THYROID STIM HORMONE: CPT

## 2017-02-17 PROCEDURE — 80053 COMPREHEN METABOLIC PANEL: CPT

## 2017-02-17 PROCEDURE — 83540 ASSAY OF IRON: CPT

## 2017-02-17 PROCEDURE — 80061 LIPID PANEL: CPT

## 2017-02-17 PROCEDURE — 82306 VITAMIN D 25 HYDROXY: CPT

## 2017-02-19 LAB
ESTIMATED AVG GLUCOSE: 108 MG/DL
HBA1C MFR BLD HPLC: 5.4 %

## 2017-03-08 ENCOUNTER — PATIENT MESSAGE (OUTPATIENT)
Dept: FAMILY MEDICINE | Facility: CLINIC | Age: 42
End: 2017-03-08

## 2017-03-08 RX ORDER — BUPROPION HYDROCHLORIDE 150 MG/1
150 TABLET ORAL DAILY
Qty: 30 TABLET | Refills: 1 | Status: SHIPPED | OUTPATIENT
Start: 2017-03-08 | End: 2017-05-09 | Stop reason: SDUPTHER

## 2017-03-08 NOTE — TELEPHONE ENCOUNTER
I've been off of Celexa for quite a while and am not doing so well. Last appt I said I was okay, but I was just trying to cope on my own without medication. I'd like to start back, but Since I had been on Celexa I've gained a lot of weight and am tired all of the time. I know I'm low on Vit D and need to continue with supplements, but want to restart something for anxiety/depression. We had talked about trying viibryd and you mentioned others that help with my energy levels. I also need to have sleep studies, but cannot afford it at this time. After taking the kids to school each morning, all I do is come home and go back to sleep. And when I'm not sleeping I'm not motivated to do anything. Which causes crying spells and more depression. Please let me know what I can do. Thanks in advance

## 2017-04-03 ENCOUNTER — HOSPITAL ENCOUNTER (OUTPATIENT)
Dept: RADIOLOGY | Facility: HOSPITAL | Age: 42
Discharge: HOME | End: 2017-04-03
Payer: COMMERCIAL

## 2017-04-03 ENCOUNTER — OFFICE VISIT (OUTPATIENT)
Dept: PODIATRY | Facility: CLINIC | Age: 42
End: 2017-04-03
Payer: COMMERCIAL

## 2017-04-03 VITALS — HEIGHT: 67 IN | BODY MASS INDEX: 31.73 KG/M2 | WEIGHT: 202.19 LBS

## 2017-04-03 DIAGNOSIS — Q66.6 PES VALGUS: ICD-10-CM

## 2017-04-03 DIAGNOSIS — M20.5X9 HALLUX LIMITUS, UNSPECIFIED LATERALITY: ICD-10-CM

## 2017-04-03 DIAGNOSIS — M71.9 BURSITIS, UNSPECIFIED SITE: Primary | ICD-10-CM

## 2017-04-03 DIAGNOSIS — B35.1 ONYCHOMYCOSIS DUE TO DERMATOPHYTE: ICD-10-CM

## 2017-04-03 DIAGNOSIS — D36.10 NEUROMA: ICD-10-CM

## 2017-04-03 DIAGNOSIS — M20.10 HALLUX ABDUCTO VALGUS, UNSPECIFIED LATERALITY: ICD-10-CM

## 2017-04-03 DIAGNOSIS — M71.9 BURSITIS, UNSPECIFIED SITE: ICD-10-CM

## 2017-04-03 PROCEDURE — 99214 OFFICE O/P EST MOD 30 MIN: CPT | Mod: 25,S$GLB,,

## 2017-04-03 PROCEDURE — 73630 X-RAY EXAM OF FOOT: CPT | Mod: 50,TC,PO

## 2017-04-03 PROCEDURE — 64455 NJX AA&/STRD PLTR COM DG NRV: CPT | Mod: 50,S$GLB,,

## 2017-04-03 PROCEDURE — 20605 DRAIN/INJ JOINT/BURSA W/O US: CPT | Mod: 59,S$GLB,,

## 2017-04-03 PROCEDURE — 73630 X-RAY EXAM OF FOOT: CPT | Mod: 26,50,, | Performed by: RADIOLOGY

## 2017-04-03 PROCEDURE — 1160F RVW MEDS BY RX/DR IN RCRD: CPT | Mod: S$GLB,,,

## 2017-04-03 PROCEDURE — 99999 PR PBB SHADOW E&M-EST. PATIENT-LVL III: CPT | Mod: PBBFAC,,,

## 2017-04-03 RX ORDER — TRIAMCINOLONE ACETONIDE 40 MG/ML
12 INJECTION, SUSPENSION INTRA-ARTICULAR; INTRAMUSCULAR
Status: COMPLETED | OUTPATIENT
Start: 2017-04-03 | End: 2017-04-03

## 2017-04-03 RX ORDER — TERBINAFINE HYDROCHLORIDE 250 MG/1
250 TABLET ORAL DAILY
Qty: 30 TABLET | Refills: 0 | Status: SHIPPED | OUTPATIENT
Start: 2017-04-03 | End: 2017-05-03

## 2017-04-03 RX ORDER — DEXAMETHASONE SODIUM PHOSPHATE 4 MG/ML
4 INJECTION, SOLUTION INTRA-ARTICULAR; INTRALESIONAL; INTRAMUSCULAR; INTRAVENOUS; SOFT TISSUE
Status: COMPLETED | OUTPATIENT
Start: 2017-04-03 | End: 2017-04-03

## 2017-04-03 RX ADMIN — DEXAMETHASONE SODIUM PHOSPHATE 4 MG: 4 INJECTION, SOLUTION INTRA-ARTICULAR; INTRALESIONAL; INTRAMUSCULAR; INTRAVENOUS; SOFT TISSUE at 04:04

## 2017-04-03 RX ADMIN — TRIAMCINOLONE ACETONIDE 12 MG: 40 INJECTION, SUSPENSION INTRA-ARTICULAR; INTRAMUSCULAR at 04:04

## 2017-04-03 NOTE — MR AVS SNAPSHOT
[facility]  [address] [facility] [address] [place] [address]  Phone: [phone]                  Ivory Cade   4/3/2017 3:00 PM   Office Visit    Description:  Female : 1975   Provider:  Javier Kelley DPM   Department:  [facility]           Reason for Visit     Foot Pain           Diagnoses this Visit        Comments    Bursitis, unspecified site    -  Primary     Pes valgus         Neuroma         Hallux limitus, unspecified laterality         Hallux abducto valgus, unspecified laterality         Onychomycosis due to dermatophyte                To Do List           Future Appointments        Provider Department Dept Phone    4/3/2017  5:30 PM [facility] XR3 [facility] [phone]    2017 1:30 PM Javier Kelley DPM [facility] [phone]      Goals (5 Years of Data)     None       These Medications        Disp Refills Start End    terbinafine HCl (LAMISIL) 250 mg tablet 30 tablet 0 4/3/2017 5/3/2017    Take 1 tablet (250 mg total) by mouth once daily. - Oral    Pharmacy: [facility]/pharmacy #[id number] - [place], [place] - [address] Ph #: [phone]         [facility]     [facility] Nurse Care Line -  Assistance  Unless otherwise directed by your provider, please contact [facility], our nurse care line that is available for  assistance.     Registered nurses in the [facility] provide: appointment scheduling, clinical advisement, health education, and other advisory services.  Call: [phone] (toll free)               Medications           Message regarding Medications     Verify the changes and/or additions to your medication regime listed below are the same as discussed with your clinician today.  If any of these changes or additions are incorrect, please notify your healthcare provider.        START taking these NEW medications        Refills    terbinafine HCl (LAMISIL) 250 mg tablet 0    Sig: Take 1  "tablet (250 mg total) by mouth once daily.    Class: Normal    Route: Oral      These medications were administered today        Dose Freq    dexamethasone injection 4 mg 4 mg Clinic/HOD 1 time    Sig: Inject 1 mL (4 mg total) into the muscle one time.    Class: Normal    Route: Intramuscular    triamcinolone acetonide injection 12 mg 12 mg Clinic/HOD 1 time    Sig: Inject 0.3 mLs (12 mg total) into the muscle one time.    Class: Normal    Route: Intramuscular           Verify that the below list of medications is an accurate representation of the medications you are currently taking.  If none reported, the list may be blank. If incorrect, please contact your healthcare provider. Carry this list with you in case of emergency.           Current Medications     albuterol 90 mcg/actuation inhaler Inhale 2 puffs into the lungs every 6 (six) hours as needed for Wheezing.    beclomethasone (QVAR) 80 mcg/actuation Aero Inhale 2 puffs into the lungs 2 (two) times daily.    buPROPion (WELLBUTRIN XL) 150 MG TB24 tablet Take 1 tablet (150 mg total) by mouth once daily.    terbinafine HCl (LAMISIL) 250 mg tablet Take 1 tablet (250 mg total) by mouth once daily.           Clinical Reference Information           Your Vitals Were     Height Weight Last Period BMI       5' 7" (1.702 m) 91.7 kg (202 lb 2.6 oz) 11/10/2016 31.66 kg/m2       Allergies as of 4/3/2017     Penicillins      Immunizations Administered on Date of Encounter - 4/3/2017     None      Orders Placed During Today's Visit     Future Labs/Procedures Expected by Expires    Hepatic function panel  4/3/2017 6/2/2018    X-Ray Foot Complete Bilateral  4/3/2017 4/3/2018      Smoking Cessation     If you would like to quit smoking:   You may be eligible for free services if you are a [place] resident and started smoking cigarettes before September 1, 1988.  Call the Smoking Cessation Trust (SCT) toll free at (388) 192-5491 or (488) 317-4461.   Call 8-993-QUIT-NOW if " you do not meet the above criteria.   Contact us via email: [email or web address]   View our website for more information: [email or web address]        Language Assistance Services     ATTENTION: Language assistance services are available, free of charge. Please call [phone].      ATENCIÓN: Si castro kate, tiene a orellana disposición servicios gratuitos de asistencia lingüística. Llame al [phone].     CHÚ Ý: N?u b?n nói Ti?ng Vi?t, có các d?ch v? h? tr? ngôn ng? mi?n phí dành cho b?n. G?i s? [phone].         [facility] complies with applicable Federal civil rights laws and does not discriminate on the basis of race, color, national origin, age, disability, or sex.

## 2017-04-03 NOTE — PROGRESS NOTES
Chief Complaint   Patient presents with    Foot Pain     chris foot pain          History of present illness: She returns to clinic with a history of plantar fasciitis on the left foot reporting pain along the billateral aspect of the fifth metatarsal base and 4th IS.  She denies any specific injury.  States she can't wear enclosed shoes,    Review of Systems:  Vascular : negative for rest pain, claudication or bruising  Musculoskeletal: Pain b/l foot  Skin: negative for rashes, open wounds and lesions  Neurological: negative for burning, tingling and numbness    Constitutional:  Patient is oriented to person, place, and time. Vital signs are normal.  Appears well-developed and well-nourished.     Vascular:  Dorsalis pedis pulses are 2+ on the right side, and 2+ on the left side.   Posterior tibial pulses are 2+ on the right side, and 2+ on the left side.   + digital hair growth, capillary fill time to all toes <3 seconds, no swelling    Skin/Dermatological:  Skin is warm and intact.  No cyanosis or clubbing.  No rashes noted.  No open wounds.       Musculoskeletal:      Limited rom b/l 1st mpj with valgus deformityCollapsing arches bilaterally with a hypermobile subtalar joint and tight heel cords.  She has no heel tenderness but she has pinpoint tenderness to the bilateral fifth metatarsal bursal sac and a lui's sign b/l 4th IS.       Neurological:  No deficits to sharp/dull, light touch or vibratory sensation.   Muscle strength to tibialis anterior, extensor hallucis longus, extensor digitorum longus, peroneal muscles, flexor hallucis/digotorum longus, posterior tibial and gastrosoleal complex is 5/5, normal tone without assymmetry   Patellar reflexes are 2+ on the right side and 2+ on the left side.  Achilles reflexes are 2+ on the right side and 2+ on the left side.          Assessment/Plan:  Bursitis left fifth metatarsal base, neuroma b/l 4th IS, pes planus, onychomycosis, hallus abducto valgus/limitus :  Discussion of the etiology of the problem.  Arch supports were dispensed.  we discussed surgical interventions for flatfeet. With the patient's permission, an injection of 12 mg of kenalog, 4 mg of dexamethasone phosphate and 1 cc of lidocaine 1% and 1 cc of marcaine 0.5% into the lateral fifth metatarsal bursal sac left foot and b/l 4th IS neuromas.  Patient tolerated well.    Alimed orthotics with 1st met head cutouts.  Onychomycosis: Reviewed patient's latest LFT which was within normal limits.  A prescription for Lamisil 250 mg orally x1 month was ordered. Repeat liver function tests in 3-4 weeks.  If wnl I will order an additional 2 months of oral Lamisil.  Dicussed risks and benefits. Lysol to this shoes.  Avoid cotton socks.  Antifungal sprays to the feet.   RC 6 weeks.

## 2017-04-06 PROBLEM — M71.9 BURSITIS: Status: ACTIVE | Noted: 2017-04-06

## 2017-04-06 PROBLEM — D36.10 NEUROMA: Status: ACTIVE | Noted: 2017-04-06

## 2017-04-06 PROBLEM — M20.10 HALLUX ABDUCTO VALGUS: Status: ACTIVE | Noted: 2017-04-06

## 2017-04-06 PROBLEM — B35.1 ONYCHOMYCOSIS DUE TO DERMATOPHYTE: Status: ACTIVE | Noted: 2017-04-06

## 2017-04-06 PROBLEM — M20.5X9 HALLUX LIMITUS: Status: ACTIVE | Noted: 2017-04-06

## 2017-04-06 PROBLEM — Q66.6 PES VALGUS: Status: ACTIVE | Noted: 2017-04-06

## 2017-05-03 ENCOUNTER — TELEPHONE (OUTPATIENT)
Dept: PODIATRY | Facility: CLINIC | Age: 42
End: 2017-05-03

## 2017-05-03 NOTE — TELEPHONE ENCOUNTER
Left message that it is time to have her blood test done and if it is normal Dr Kelley will refill her Lamisil.

## 2017-05-09 RX ORDER — BUPROPION HYDROCHLORIDE 150 MG/1
TABLET ORAL
Qty: 30 TABLET | Refills: 3 | Status: SHIPPED | OUTPATIENT
Start: 2017-05-09 | End: 2017-10-04 | Stop reason: SDUPTHER

## 2017-10-04 RX ORDER — BUPROPION HYDROCHLORIDE 150 MG/1
TABLET ORAL
Qty: 30 TABLET | Refills: 1 | Status: SHIPPED | OUTPATIENT
Start: 2017-10-04 | End: 2017-11-16 | Stop reason: SDUPTHER

## 2017-11-16 ENCOUNTER — OFFICE VISIT (OUTPATIENT)
Dept: FAMILY MEDICINE | Facility: CLINIC | Age: 42
End: 2017-11-16
Payer: COMMERCIAL

## 2017-11-16 VITALS
HEIGHT: 67 IN | BODY MASS INDEX: 31.33 KG/M2 | WEIGHT: 199.63 LBS | TEMPERATURE: 99 F | HEART RATE: 96 BPM | DIASTOLIC BLOOD PRESSURE: 88 MMHG | OXYGEN SATURATION: 98 % | SYSTOLIC BLOOD PRESSURE: 126 MMHG

## 2017-11-16 DIAGNOSIS — E66.09 CLASS 1 OBESITY DUE TO EXCESS CALORIES WITHOUT SERIOUS COMORBIDITY WITH BODY MASS INDEX (BMI) OF 31.0 TO 31.9 IN ADULT: Primary | ICD-10-CM

## 2017-11-16 DIAGNOSIS — F33.41 RECURRENT MAJOR DEPRESSIVE DISORDER, IN PARTIAL REMISSION: Chronic | ICD-10-CM

## 2017-11-16 PROCEDURE — 99999 PR PBB SHADOW E&M-EST. PATIENT-LVL III: CPT | Mod: PBBFAC,,, | Performed by: INTERNAL MEDICINE

## 2017-11-16 PROCEDURE — 99214 OFFICE O/P EST MOD 30 MIN: CPT | Mod: S$GLB,,, | Performed by: INTERNAL MEDICINE

## 2017-11-16 RX ORDER — BUPROPION HYDROCHLORIDE 150 MG/1
300 TABLET ORAL DAILY
Qty: 60 TABLET | Refills: 1 | Status: SHIPPED | OUTPATIENT
Start: 2017-11-16 | End: 2018-02-01 | Stop reason: SDUPTHER

## 2017-11-16 NOTE — PROGRESS NOTES
Assessment and Plan:    1. Recurrent major depressive disorder, in partial remission  Suspect that the symptoms as described below more symptoms of depression than side effects of the wellbutrin as they were present before she started this and have failed to resolve. Discussed options for better managing the depression including changing to another medication, or increasing the dose of the wellbutrin, and she favors increasing the dose.   - buPROPion (WELLBUTRIN XL) 150 MG TB24 tablet; Take 2 tablets (300 mg total) by mouth once daily.  Dispense: 60 tablet; Refill: 1    2. Class 1 obesity due to excess calories without serious comorbidity with body mass index (BMI) of 31.0 to 31.9 in adult  Weight has been stable, patient reports interest in possible medical management with metformin. No history of insulin resistance. Will consider this prior to next apt.         ______________________________________________________________________  Subjective:    Chief Complaint:  Follow up depression    HPI:  Ivory is a 42 y.o. year old woman here to follow up depression    Was previously on Celexa- gaining weight and tired all the time  Switched to wellbutrin- still feeling tired during the day, taking it in the morning  - Notably quit smoking on this  Still having depressed mood, tearful  Notes at work that she is feeling like she is in a fog, trouble with attention  Trouble with memory    She also notes that she has been struggling with weight gain, but does not attribute this to the wellbutrin, has been more stable on this, just unable to lose weight.    Medications:  Current Outpatient Prescriptions on File Prior to Visit   Medication Sig Dispense Refill    albuterol 90 mcg/actuation inhaler Inhale 2 puffs into the lungs every 6 (six) hours as needed for Wheezing. 18 g 5    buPROPion (WELLBUTRIN XL) 150 MG TB24 tablet TAKE 1 TABLET (150 MG TOTAL) BY MOUTH ONCE DAILY. 30 tablet 1    [DISCONTINUED] beclomethasone (QVAR) 80  "mcg/actuation Aero Inhale 2 puffs into the lungs 2 (two) times daily. (Patient taking differently: Inhale 2 puffs into the lungs 2 (two) times daily as needed. ) 120 each 11     No current facility-administered medications on file prior to visit.        Review of Systems:  Review of Systems   Constitutional: Positive for fatigue. Negative for activity change, appetite change, chills, fever and unexpected weight change.   Psychiatric/Behavioral: Positive for decreased concentration and dysphoric mood. Negative for hallucinations, self-injury and suicidal ideas. The patient is nervous/anxious. The patient is not hyperactive.        Past Medical History:  Past Medical History:   Diagnosis Date    Abdominal mass, left lower quadrant 11/2016    Anxiety disorder 2010    Asthma     Bilateral ovarian cysts     Cancer antigen 125 () elevation 11/2016    High cholesterol     History of hemorrhoids     Incisional abscess     right breast I/D abscess 2012    MTHFR mutation     Thrombocytopenia complicating pregnancy 2010 and 2012       Objective:     Vitals:  Vitals:    11/16/17 1357 11/16/17 1426   BP: (!) 130/100 126/88   Pulse: 96    Temp: 98.7 °F (37.1 °C)    TempSrc: Oral    SpO2: 98%    Weight: 90.5 kg (199 lb 10 oz)    Height: 5' 7" (1.702 m)    PainSc:   1      Body mass index is 31.27 kg/m².      Physical Exam   Constitutional: She is oriented to person, place, and time. She appears well-developed and well-nourished. No distress.   HENT:   Mouth/Throat: Oropharynx is clear and moist.   Eyes: No scleral icterus.   Pulmonary/Chest: Effort normal. No respiratory distress.   Musculoskeletal: She exhibits no edema.   Neurological: She is alert and oriented to person, place, and time.   Skin: Skin is warm and dry.   Psychiatric: She has a normal mood and affect. Her behavior is normal.   Intermittently tearful when discussing depressed mood   Vitals reviewed.      Didi Conteh MD  Internal Medicine  "

## 2017-12-11 RX ORDER — BUPROPION HYDROCHLORIDE 150 MG/1
TABLET ORAL
Qty: 90 TABLET | Refills: 1 | Status: SHIPPED | OUTPATIENT
Start: 2017-12-11 | End: 2017-12-28

## 2017-12-28 ENCOUNTER — OFFICE VISIT (OUTPATIENT)
Dept: FAMILY MEDICINE | Facility: CLINIC | Age: 42
End: 2017-12-28
Payer: COMMERCIAL

## 2017-12-28 VITALS
SYSTOLIC BLOOD PRESSURE: 122 MMHG | HEIGHT: 67 IN | WEIGHT: 205.5 LBS | DIASTOLIC BLOOD PRESSURE: 86 MMHG | TEMPERATURE: 98 F | HEART RATE: 84 BPM | BODY MASS INDEX: 32.25 KG/M2

## 2017-12-28 DIAGNOSIS — R07.89 CHEST DISCOMFORT: Primary | ICD-10-CM

## 2017-12-28 DIAGNOSIS — F33.41 RECURRENT MAJOR DEPRESSIVE DISORDER, IN PARTIAL REMISSION: Chronic | ICD-10-CM

## 2017-12-28 DIAGNOSIS — Z72.0 TOBACCO ABUSE: ICD-10-CM

## 2017-12-28 PROCEDURE — 99214 OFFICE O/P EST MOD 30 MIN: CPT | Mod: S$GLB,,, | Performed by: INTERNAL MEDICINE

## 2017-12-28 PROCEDURE — 99999 PR PBB SHADOW E&M-EST. PATIENT-LVL III: CPT | Mod: PBBFAC,,, | Performed by: INTERNAL MEDICINE

## 2017-12-28 NOTE — PROGRESS NOTES
Assessment and Plan:    1. Chest discomfort  Generally atypical sounding chest pain with some concerning features (pressure sensation, associated nausea, radiation pattern). Discussed options at this point, and patient prefers to have a stress test to help determine if there is anything concerning. We discussed other possible options including GERD (less likely as she took multiple medications for GERD with no relief), esophageal spasm, chest wall pain. Discussed concerning features that would warrant urgent presentation to ED.   - Exercise stress echo; Future    2. Recurrent major depressive disorder, in partial remission  Improved symptoms on the increased dose of wellbutrin. Notes not wanting to make any changes at this time.     3. Tobacco abuse  Discussed that even if testing is negative, the current smoking is the main thing elevating her risk of heart attack or stroke, and strongly recommend quitting. Notes that the wellbutrin is helping her cut back, but she has not quit at this time.         ______________________________________________________________________  Subjective:    Chief Complaint:  Chest discomfort    HPI:  Ivory is a 42 y.o. year old woman here for evaluation of chest discomfort.     2 days ago had an episode of chest pain which she describes as tightness and pressure, located in her mid upper chest, radiated to her throat as well as mid back. Pain was not exacerbated by activity, no relieved by rest. She has had some associated nausea, but no diaphoresis or vomiting. Notes that she was initially concerned that this was probably heartburn, but reports that she tried taking malox, tums, and pepcid and that none of this helped even a little. Reports that she tried laying on a heating pad to help with the mid back pain, but that this was not helpful.     Reports that her father had a heart attack at age 53, and she is very concerned about her risk for this being a heart attack. At this point, the  chest discomfort has completely resolved. Notes that she is now starting to feel fatigued and a bit achy, making her wonder if she is coming down with a viral syndrome.     Notes that she does fell like her depression is better controlled with the increased dose of bupropion. Reports that she is still having some anxiety, but does not want to make any changes to the dose or type of medication at this time.     Medications:  Current Outpatient Prescriptions on File Prior to Visit   Medication Sig Dispense Refill    albuterol 90 mcg/actuation inhaler Inhale 2 puffs into the lungs every 6 (six) hours as needed for Wheezing. 18 g 5    buPROPion (WELLBUTRIN XL) 150 MG TB24 tablet Take 2 tablets (300 mg total) by mouth once daily. 60 tablet 1    [DISCONTINUED] buPROPion (WELLBUTRIN XL) 150 MG TB24 tablet TAKE 1 TABLET (150 MG TOTAL) BY MOUTH ONCE DAILY. 90 tablet 1     No current facility-administered medications on file prior to visit.        Review of Systems:  Review of Systems   Constitutional: Positive for fatigue. Negative for diaphoresis.   Respiratory: Positive for chest tightness. Negative for cough, shortness of breath and wheezing.    Cardiovascular: Positive for chest pain. Negative for palpitations and leg swelling.   Gastrointestinal: Positive for nausea. Negative for abdominal pain and vomiting.   Musculoskeletal: Positive for myalgias.   Psychiatric/Behavioral: Negative for dysphoric mood. The patient is nervous/anxious.        Past Medical History:  Past Medical History:   Diagnosis Date    Abdominal mass, left lower quadrant 11/2016    Anxiety disorder 2010    Asthma     Bilateral ovarian cysts     Cancer antigen 125 () elevation 11/2016    High cholesterol     History of hemorrhoids     Incisional abscess     right breast I/D abscess 2012    MTHFR mutation     Thrombocytopenia complicating pregnancy 2010 and 2012       Objective:    Vitals:  Vitals:    12/28/17 1542   BP: 122/86  "  Pulse: 84   Temp: 98.3 °F (36.8 °C)   Weight: 93.2 kg (205 lb 7.5 oz)   Height: 5' 7" (1.702 m)   PainSc: 0-No pain       Physical Exam   Constitutional: She is oriented to person, place, and time. She appears well-developed and well-nourished. No distress.   HENT:   Mouth/Throat: Oropharynx is clear and moist. No oropharyngeal exudate.   Eyes: No scleral icterus.   Neck: No tracheal deviation present.   Cardiovascular: Normal rate, regular rhythm, normal heart sounds and intact distal pulses.  Exam reveals no gallop and no friction rub.    No murmur heard.  Pulmonary/Chest: Effort normal and breath sounds normal. No stridor. No respiratory distress. She has no wheezes. She has no rales.   Musculoskeletal: She exhibits no edema.   Neurological: She is alert and oriented to person, place, and time.   Skin: Skin is warm and dry. She is not diaphoretic.   Psychiatric: She has a normal mood and affect. Her behavior is normal.   Vitals reviewed.       Data:  Previous labs reviewed and pertinent for lipid panel with , HDL 40.      Didi Conteh MD  Internal Medicine  "

## 2018-01-04 ENCOUNTER — CLINICAL SUPPORT (OUTPATIENT)
Dept: CARDIOLOGY | Facility: CLINIC | Age: 43
End: 2018-01-04
Attending: INTERNAL MEDICINE
Payer: COMMERCIAL

## 2018-01-04 ENCOUNTER — PATIENT MESSAGE (OUTPATIENT)
Dept: FAMILY MEDICINE | Facility: CLINIC | Age: 43
End: 2018-01-04

## 2018-01-04 DIAGNOSIS — R07.89 CHEST DISCOMFORT: ICD-10-CM

## 2018-01-04 LAB
DIASTOLIC DYSFUNCTION: NO
MITRAL VALVE MOBILITY: NORMAL
RETIRED EF AND QEF - SEE NOTES: 60 (ref 55–65)

## 2018-01-04 PROCEDURE — 93321 DOPPLER ECHO F-UP/LMTD STD: CPT | Mod: S$GLB,,, | Performed by: INTERNAL MEDICINE

## 2018-01-04 PROCEDURE — 93351 STRESS TTE COMPLETE: CPT | Mod: S$GLB,,, | Performed by: INTERNAL MEDICINE

## 2018-01-24 ENCOUNTER — PATIENT MESSAGE (OUTPATIENT)
Dept: FAMILY MEDICINE | Facility: CLINIC | Age: 43
End: 2018-01-24

## 2018-01-25 ENCOUNTER — PATIENT MESSAGE (OUTPATIENT)
Dept: FAMILY MEDICINE | Facility: CLINIC | Age: 43
End: 2018-01-25

## 2018-01-26 ENCOUNTER — OFFICE VISIT (OUTPATIENT)
Dept: FAMILY MEDICINE | Facility: CLINIC | Age: 43
End: 2018-01-26
Payer: COMMERCIAL

## 2018-01-26 VITALS
DIASTOLIC BLOOD PRESSURE: 84 MMHG | SYSTOLIC BLOOD PRESSURE: 122 MMHG | TEMPERATURE: 99 F | WEIGHT: 202.63 LBS | HEART RATE: 92 BPM | HEIGHT: 67 IN | BODY MASS INDEX: 31.8 KG/M2

## 2018-01-26 DIAGNOSIS — R07.89 CHEST DISCOMFORT: Primary | ICD-10-CM

## 2018-01-26 PROCEDURE — 99999 PR PBB SHADOW E&M-EST. PATIENT-LVL III: CPT | Mod: PBBFAC,,, | Performed by: INTERNAL MEDICINE

## 2018-01-26 PROCEDURE — 99213 OFFICE O/P EST LOW 20 MIN: CPT | Mod: S$GLB,,, | Performed by: INTERNAL MEDICINE

## 2018-01-26 NOTE — PROGRESS NOTES
Assessment and Plan:    1. Chest discomfort  Discussed that based on exam today, normal vital signs, and normal stress test and EKG earlier this month, I am not sure what exactly is causing this pain, but that it does not appear to be something serious. Possible that this is more related to the viral URI she is having, though the time course does not entirely fit. More likely MSK, but ibuprofen does not help with it which makes that less certain. Reassured her that I do not think this is something serious, and that she can continue to try taking hot baths and ibuprofen and I suspect this will go away.         ______________________________________________________________________  Subjective:    Chief Complaint:  Chest discomfort    HPI:  Ivory is a 42 y.o. year old woman here for evaluation of chest discomfort.     She reports that the sensation and discomfort that she is having now is different from the atypical chest pain that she was having previously that prompted the stress test which was normal.     She reports that this new sensation that has developed is like pain in her lungs. Notes that this is bilateral and located in her upper chest. She also has a cold now and the pain happens with coughing, but she reports that she started having the upper chest pain first about 2 weeks ago and just developed the URI symptoms 3 days ago. Mainly just having cough and runny nose. Has used her albuterol inhaler a couple of times in the last few2 days to see if this would help and it did not change the symptoms. Also having some vague upper back pain in the last couple of weeks which has been helped by ibuprofen and hot baths.     She notes that the pain is not brought on by eating, and she has tried taking tums just to see if it helped and it did not. Ibuprofen does not really help the pain.     She notes overall that the pain is minor, she just wanted to come in to make sure it isn't something serious.     Notes that she  "has almost quit smoking, but has had 4 cigarettes in the last 2 weeks. Notes that she should just get rid of them.     Medications:  Current Outpatient Prescriptions on File Prior to Visit   Medication Sig Dispense Refill    albuterol 90 mcg/actuation inhaler Inhale 2 puffs into the lungs every 6 (six) hours as needed for Wheezing. 18 g 5    buPROPion (WELLBUTRIN XL) 150 MG TB24 tablet Take 2 tablets (300 mg total) by mouth once daily. 60 tablet 1     No current facility-administered medications on file prior to visit.        Review of Systems:  Review of Systems   Constitutional: Negative for chills and fever.   HENT: Positive for congestion, postnasal drip, rhinorrhea and sore throat.    Respiratory: Positive for cough. Negative for shortness of breath and wheezing.    Cardiovascular: Positive for chest pain. Negative for palpitations and leg swelling.       Past Medical History:  Past Medical History:   Diagnosis Date    Abdominal mass, left lower quadrant 11/2016    Anxiety disorder 2010    Asthma     Bilateral ovarian cysts     Cancer antigen 125 () elevation 11/2016    High cholesterol     History of hemorrhoids     Incisional abscess     right breast I/D abscess 2012    MTHFR mutation     Thrombocytopenia complicating pregnancy 2010 and 2012       Objective:    Vitals:  Vitals:    01/26/18 0937   BP: 122/84   Pulse: 92   Temp: 98.5 °F (36.9 °C)   Weight: 91.9 kg (202 lb 9.6 oz)   Height: 5' 7" (1.702 m)   PainSc:   1   PainLoc: Chest       Physical Exam   Constitutional: She is oriented to person, place, and time. She appears well-developed and well-nourished. No distress.   HENT:   Mouth/Throat: Posterior oropharyngeal erythema present. Tonsils are 0 on the right. Tonsils are 0 on the left. No tonsillar exudate.   Eyes: No scleral icterus.   Cardiovascular: Normal rate, regular rhythm and normal heart sounds.    No murmur heard.  Pulmonary/Chest: Effort normal and breath sounds normal. No " respiratory distress. She has no wheezes. She has no rales. She exhibits no tenderness.   Musculoskeletal: She exhibits no edema.   Neurological: She is alert and oriented to person, place, and time.   Skin: Skin is warm and dry.   Psychiatric: She has a normal mood and affect. Her behavior is normal.   Vitals reviewed.      Data:    Stress test earlier this month:  No evidence of stress induced myocardial ischemia.     Didi Conteh MD  Internal Medicine

## 2018-02-01 DIAGNOSIS — F33.41 RECURRENT MAJOR DEPRESSIVE DISORDER, IN PARTIAL REMISSION: Chronic | ICD-10-CM

## 2018-02-01 RX ORDER — BUPROPION HYDROCHLORIDE 150 MG/1
300 TABLET ORAL DAILY
Qty: 60 TABLET | Refills: 2 | Status: SHIPPED | OUTPATIENT
Start: 2018-02-01 | End: 2018-05-13 | Stop reason: SDUPTHER

## 2018-03-09 ENCOUNTER — OFFICE VISIT (OUTPATIENT)
Dept: URGENT CARE | Facility: CLINIC | Age: 43
End: 2018-03-09
Payer: COMMERCIAL

## 2018-03-09 VITALS
HEART RATE: 87 BPM | TEMPERATURE: 98 F | DIASTOLIC BLOOD PRESSURE: 89 MMHG | BODY MASS INDEX: 31.39 KG/M2 | HEIGHT: 67 IN | SYSTOLIC BLOOD PRESSURE: 145 MMHG | OXYGEN SATURATION: 98 % | WEIGHT: 200 LBS

## 2018-03-09 DIAGNOSIS — J06.9 URI, ACUTE: Primary | ICD-10-CM

## 2018-03-09 PROCEDURE — 99213 OFFICE O/P EST LOW 20 MIN: CPT | Mod: 25,S$GLB,, | Performed by: FAMILY MEDICINE

## 2018-03-09 PROCEDURE — 96372 THER/PROPH/DIAG INJ SC/IM: CPT | Mod: S$GLB,,, | Performed by: FAMILY MEDICINE

## 2018-03-09 RX ORDER — AZITHROMYCIN 250 MG/1
250 TABLET, FILM COATED ORAL DAILY
Qty: 6 TABLET | Refills: 0 | Status: SHIPPED | OUTPATIENT
Start: 2018-03-09 | End: 2018-03-15

## 2018-03-09 RX ORDER — BETAMETHASONE SODIUM PHOSPHATE AND BETAMETHASONE ACETATE 3; 3 MG/ML; MG/ML
9 INJECTION, SUSPENSION INTRA-ARTICULAR; INTRALESIONAL; INTRAMUSCULAR; SOFT TISSUE
Status: COMPLETED | OUTPATIENT
Start: 2018-03-09 | End: 2018-03-09

## 2018-03-09 RX ADMIN — BETAMETHASONE SODIUM PHOSPHATE AND BETAMETHASONE ACETATE 9 MG: 3; 3 INJECTION, SUSPENSION INTRA-ARTICULAR; INTRALESIONAL; INTRAMUSCULAR; SOFT TISSUE at 03:03

## 2018-03-09 NOTE — PROGRESS NOTES
"Subjective:       Patient ID: Ivory Cade is a 42 y.o. female.    Vitals:  height is 5' 7" (1.702 m) and weight is 90.7 kg (200 lb). Her oral temperature is 98.1 °F (36.7 °C). Her blood pressure is 145/89 (abnormal) and her pulse is 87. Her oxygen saturation is 98%.     Chief Complaint: Nasal Congestion    Co feeling bad last week, then felt better and now with sx again for 2 days. Co runny nose and cough      Cough   This is a new problem. The current episode started in the past 7 days. The problem has been gradually worsening. The problem occurs every few minutes. Associated symptoms include ear pain (from the cough), nasal congestion and postnasal drip. Pertinent negatives include no chest pain, chills, eye redness, fever, headaches, myalgias, sore throat, shortness of breath or wheezing. She has tried OTC cough suppressant for the symptoms. The treatment provided mild relief.     Review of Systems   Constitution: Negative for chills, fever and malaise/fatigue.   HENT: Positive for congestion, ear pain (from the cough), hoarse voice and postnasal drip. Negative for sore throat.    Eyes: Negative for discharge and redness.   Cardiovascular: Negative for chest pain, dyspnea on exertion and leg swelling.   Respiratory: Positive for cough. Negative for shortness of breath, sputum production and wheezing.    Musculoskeletal: Negative for myalgias.   Gastrointestinal: Positive for nausea. Negative for abdominal pain, diarrhea and vomiting.   Neurological: Negative for headaches.       Objective:      Physical Exam   Constitutional: She appears well-developed and well-nourished.   HENT:   Head: Normocephalic and atraumatic.   Right Ear: External ear normal. A middle ear effusion is present.   Left Ear: External ear normal. A middle ear effusion is present.   Nose: Nose normal.   Mouth/Throat: Oropharynx is clear and moist. No oropharyngeal exudate.   Eyes: Conjunctivae are normal. Right eye exhibits no discharge. " Left eye exhibits no discharge.   Cardiovascular: Normal rate, regular rhythm and normal heart sounds.    Pulmonary/Chest: Effort normal and breath sounds normal. She has no wheezes.   Skin: Skin is warm and dry.   Psychiatric: She has a normal mood and affect. Her behavior is normal.   Vitals reviewed.      Assessment:       1. URI, acute        Plan:         URI, acute    Other orders  -     betamethasone acetate-betamethasone sodium phosphate injection 9 mg; Inject 1.5 mLs (9 mg total) into the muscle one time.  -     azithromycin (ZITHROMAX Z-GABRIELA) 250 MG tablet; Take 1 tablet (250 mg total) by mouth once daily. TWO TABS DAY ONE, ONE TAB DAYS TWO-FIVE  Dispense: 6 tablet; Refill: 0

## 2018-05-13 DIAGNOSIS — F33.41 RECURRENT MAJOR DEPRESSIVE DISORDER, IN PARTIAL REMISSION: Chronic | ICD-10-CM

## 2018-05-14 RX ORDER — BUPROPION HYDROCHLORIDE 150 MG/1
300 TABLET ORAL DAILY
Qty: 60 TABLET | Refills: 2 | Status: SHIPPED | OUTPATIENT
Start: 2018-05-14 | End: 2018-08-26 | Stop reason: SDUPTHER

## 2018-08-15 ENCOUNTER — PATIENT MESSAGE (OUTPATIENT)
Dept: FAMILY MEDICINE | Facility: CLINIC | Age: 43
End: 2018-08-15

## 2018-08-15 DIAGNOSIS — E55.9 VITAMIN D DEFICIENCY: ICD-10-CM

## 2018-08-15 DIAGNOSIS — Z00.00 PREVENTATIVE HEALTH CARE: Primary | ICD-10-CM

## 2018-08-16 NOTE — TELEPHONE ENCOUNTER
Please review pt's last labs from Feb 2017 and advise if she should have any at this time. Will contact pt to schedule labs and appt.

## 2018-08-21 ENCOUNTER — LAB VISIT (OUTPATIENT)
Dept: LAB | Facility: HOSPITAL | Age: 43
End: 2018-08-21
Attending: INTERNAL MEDICINE
Payer: COMMERCIAL

## 2018-08-21 DIAGNOSIS — Z00.00 PREVENTATIVE HEALTH CARE: ICD-10-CM

## 2018-08-21 DIAGNOSIS — E55.9 VITAMIN D DEFICIENCY: ICD-10-CM

## 2018-08-21 LAB
25(OH)D3+25(OH)D2 SERPL-MCNC: 27 NG/ML
ALBUMIN SERPL BCP-MCNC: 3.9 G/DL
ALP SERPL-CCNC: 57 U/L
ALT SERPL W/O P-5'-P-CCNC: 12 U/L
ANION GAP SERPL CALC-SCNC: 8 MMOL/L
AST SERPL-CCNC: 12 U/L
BASOPHILS # BLD AUTO: 0.05 K/UL
BASOPHILS NFR BLD: 0.9 %
BILIRUB SERPL-MCNC: 0.5 MG/DL
BUN SERPL-MCNC: 14 MG/DL
CALCIUM SERPL-MCNC: 9.7 MG/DL
CHLORIDE SERPL-SCNC: 107 MMOL/L
CHOLEST SERPL-MCNC: 261 MG/DL
CHOLEST/HDLC SERPL: 4.8 {RATIO}
CO2 SERPL-SCNC: 24 MMOL/L
CREAT SERPL-MCNC: 0.8 MG/DL
DIFFERENTIAL METHOD: ABNORMAL
EOSINOPHIL # BLD AUTO: 0.4 K/UL
EOSINOPHIL NFR BLD: 6.1 %
ERYTHROCYTE [DISTWIDTH] IN BLOOD BY AUTOMATED COUNT: 12.8 %
EST. GFR  (AFRICAN AMERICAN): >60 ML/MIN/1.73 M^2
EST. GFR  (NON AFRICAN AMERICAN): >60 ML/MIN/1.73 M^2
ESTIMATED AVG GLUCOSE: 94 MG/DL
GLUCOSE SERPL-MCNC: 100 MG/DL
HBA1C MFR BLD HPLC: 4.9 %
HCT VFR BLD AUTO: 37.1 %
HDLC SERPL-MCNC: 54 MG/DL
HDLC SERPL: 20.7 %
HGB BLD-MCNC: 11.8 G/DL
IMM GRANULOCYTES # BLD AUTO: 0.02 K/UL
IMM GRANULOCYTES NFR BLD AUTO: 0.3 %
LDLC SERPL CALC-MCNC: 187 MG/DL
LYMPHOCYTES # BLD AUTO: 1.6 K/UL
LYMPHOCYTES NFR BLD: 27.6 %
MCH RBC QN AUTO: 31.8 PG
MCHC RBC AUTO-ENTMCNC: 31.8 G/DL
MCV RBC AUTO: 100 FL
MONOCYTES # BLD AUTO: 0.4 K/UL
MONOCYTES NFR BLD: 6.2 %
NEUTROPHILS # BLD AUTO: 3.4 K/UL
NEUTROPHILS NFR BLD: 58.9 %
NONHDLC SERPL-MCNC: 207 MG/DL
NRBC BLD-RTO: 0 /100 WBC
PLATELET # BLD AUTO: 167 K/UL
PMV BLD AUTO: 11.2 FL
POTASSIUM SERPL-SCNC: 4 MMOL/L
PROT SERPL-MCNC: 6.8 G/DL
RBC # BLD AUTO: 3.71 M/UL
SODIUM SERPL-SCNC: 139 MMOL/L
TRIGL SERPL-MCNC: 100 MG/DL
TSH SERPL DL<=0.005 MIU/L-ACNC: 2.44 UIU/ML
WBC # BLD AUTO: 5.77 K/UL

## 2018-08-21 PROCEDURE — 85025 COMPLETE CBC W/AUTO DIFF WBC: CPT

## 2018-08-21 PROCEDURE — 80053 COMPREHEN METABOLIC PANEL: CPT

## 2018-08-21 PROCEDURE — 83036 HEMOGLOBIN GLYCOSYLATED A1C: CPT

## 2018-08-21 PROCEDURE — 82306 VITAMIN D 25 HYDROXY: CPT

## 2018-08-21 PROCEDURE — 36415 COLL VENOUS BLD VENIPUNCTURE: CPT | Mod: PN

## 2018-08-21 PROCEDURE — 84443 ASSAY THYROID STIM HORMONE: CPT

## 2018-08-21 PROCEDURE — 80061 LIPID PANEL: CPT

## 2018-08-23 ENCOUNTER — TELEPHONE (OUTPATIENT)
Dept: FAMILY MEDICINE | Facility: CLINIC | Age: 43
End: 2018-08-23

## 2018-08-23 NOTE — TELEPHONE ENCOUNTER
Please call patient about labs. The labs were ordered to be done with an annual exam, which she still needs to schedule. The labs do show that cholesterol is high and she is slightly anemic. Please assist with scheduling visit for annual.

## 2018-08-26 DIAGNOSIS — F33.41 RECURRENT MAJOR DEPRESSIVE DISORDER, IN PARTIAL REMISSION: Chronic | ICD-10-CM

## 2018-08-27 RX ORDER — BUPROPION HYDROCHLORIDE 150 MG/1
300 TABLET ORAL DAILY
Qty: 60 TABLET | Refills: 2 | Status: SHIPPED | OUTPATIENT
Start: 2018-08-27 | End: 2018-12-12 | Stop reason: SDUPTHER

## 2018-08-28 ENCOUNTER — OFFICE VISIT (OUTPATIENT)
Dept: FAMILY MEDICINE | Facility: CLINIC | Age: 43
End: 2018-08-28
Payer: COMMERCIAL

## 2018-08-28 VITALS
HEIGHT: 67 IN | SYSTOLIC BLOOD PRESSURE: 138 MMHG | HEART RATE: 76 BPM | WEIGHT: 194.31 LBS | DIASTOLIC BLOOD PRESSURE: 84 MMHG | BODY MASS INDEX: 30.5 KG/M2

## 2018-08-28 DIAGNOSIS — K64.9 HEMORRHOIDS, UNSPECIFIED HEMORRHOID TYPE: ICD-10-CM

## 2018-08-28 DIAGNOSIS — E78.00 PURE HYPERCHOLESTEROLEMIA: ICD-10-CM

## 2018-08-28 DIAGNOSIS — D53.9 MACROCYTIC ANEMIA: ICD-10-CM

## 2018-08-28 DIAGNOSIS — E55.9 VITAMIN D DEFICIENCY: ICD-10-CM

## 2018-08-28 DIAGNOSIS — Z00.00 PREVENTATIVE HEALTH CARE: Primary | ICD-10-CM

## 2018-08-28 PROCEDURE — 99999 PR PBB SHADOW E&M-EST. PATIENT-LVL III: CPT | Mod: PBBFAC,,, | Performed by: INTERNAL MEDICINE

## 2018-08-28 PROCEDURE — 99396 PREV VISIT EST AGE 40-64: CPT | Mod: S$GLB,,, | Performed by: INTERNAL MEDICINE

## 2018-08-28 RX ORDER — HYDROCORTISONE 25 MG/G
CREAM TOPICAL 2 TIMES DAILY
Qty: 1 TUBE | Refills: 2 | Status: SHIPPED | OUTPATIENT
Start: 2018-08-28 | End: 2019-07-09

## 2018-08-28 NOTE — PROGRESS NOTES
Assessment and Plan:    1. Preventative health care  Discussed age appropriate preventative healthcare items including avoidance of tobacco, excessive alcohol consumption, and illicit drugs. Discussed safe sex practices. Discussed appropriate use of sunscreen, seatbelts, etc. Discussed maintenance of a healthy weight.   Discussed recommendation for yearly flu shot as soon as they become available. Patient is planning to get this through her employer.   No longer getting Pap smears, had hysterectomy with removal of cervix for benign reasons, negative HPV cotest at that time.   - Mammo Digital Screening Bilat with CAD; Future    2. Pure hypercholesterolemia  ASCVD risk 1.4% based on recent labs. We discussed continuing to work on diet and exercise but no additional therapy needed at this time.     3. Vitamin D deficiency  Still slightly low, recommended restarting the OTC vitamin D supplement.     4. Macrocytic anemia  Thought to be due to folate deficiency, previously has been diagnosed with MTHFR gene mutation. Discussed folate supplementation.    5. Hemorrhoids, unspecified hemorrhoid type  - hydrocortisone 2.5 % cream; Apply topically 2 (two) times daily. for 10 days  Dispense: 1 Tube; Refill: 2        ______________________________________________________________________  Subjective:    Chief Complaint:  Annual exam    HPI:  Ivory is a 43 y.o. year old woman here for annual exam.    She notes that she has been trying to lose weight recently, has been eating lower carb, no junk food. She had been able to lose 10 lbs down, but has been fluctuating. Has not been as diligent with exercise recently as she would like.     She has a history of IBS, has had bad constipation and diarrhea alternating. Recently had been more constipated and has had a lot of itching associated with external hemorrhoids. She has not been having bleeding associated with the hemorrhoids unless she has a very hard stool.     She has not been  taking the vitamin D or folate supplements recently.     She has finally quit smoking.     Past Medical History:  Past Medical History:   Diagnosis Date    Abdominal mass, left lower quadrant 11/2016    Anxiety disorder 2010    Asthma     Bilateral ovarian cysts     Cancer antigen 125 () elevation 11/2016    High cholesterol     History of hemorrhoids     Incisional abscess     right breast I/D abscess 2012    MTHFR mutation     Thrombocytopenia complicating pregnancy 2010 and 2012       Past Surgical History:  Past Surgical History:   Procedure Laterality Date    BREAST CYST INCISION AND DRAINAGE      right breast abscess I/D 2012    essure  2012    HYSTERECTOMY      partial - secondary to ovarian mass -benign        Family History:  Family History   Problem Relation Age of Onset    Celiac disease Maternal Aunt     Hepatitis Maternal Aunt         autoimmune    Colon cancer Maternal Grandmother     Cancer Maternal Grandmother     Hepatitis Mother         autoimmune    Hypertension Mother     Pulmonary embolism Mother     Hypertension Father     Hyperlipidemia Father     Heart disease Father     Early death Father 53        MI    No Known Problems Brother     Breast cancer Neg Hx     Diabetes Neg Hx     Miscarriages / Stillbirths Neg Hx     Ovarian cancer Neg Hx     Stroke Neg Hx        Social History:  Social History     Socioeconomic History    Marital status:      Spouse name: None    Number of children: 2    Years of education: None    Highest education level: None   Social Needs    Financial resource strain: None    Food insecurity - worry: None    Food insecurity - inability: None    Transportation needs - medical: None    Transportation needs - non-medical: None   Occupational History    None   Tobacco Use    Smoking status: Former Smoker     Packs/day: 0.25     Types: Vaping w/o nicotine    Smokeless tobacco: Never Used    Tobacco comment: since Feb.  2018   Substance and Sexual Activity    Alcohol use: Yes     Alcohol/week: 4.2 oz     Types: 7 Glasses of wine per week    Drug use: No    Sexual activity: Yes     Partners: Male   Other Topics Concern    None   Social History Narrative     asked for separation and pt and 2 children moved out a month ago, (July 2015) now live in apartment. 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        Medications:  Current Outpatient Medications on File Prior to Visit   Medication Sig Dispense Refill    albuterol 90 mcg/actuation inhaler Inhale 2 puffs into the lungs every 6 (six) hours as needed for Wheezing. 18 g 5    buPROPion (WELLBUTRIN XL) 150 MG TB24 tablet TAKE 2 TABLETS (300 MG TOTAL) BY MOUTH ONCE DAILY. 60 tablet 2    multivitamin capsule Take 1 capsule by mouth once daily.       No current facility-administered medications on file prior to visit.        Allergies:  Penicillins    Immunizations:  Immunization History   Administered Date(s) Administered    Pneumococcal Polysaccharide - 23 Valent 01/26/2017       Review of Systems:  Review of Systems   Constitutional: Negative for activity change and unexpected weight change.   HENT: Negative for hearing loss, rhinorrhea and trouble swallowing.    Eyes: Positive for visual disturbance. Negative for discharge.   Respiratory: Negative for chest tightness and wheezing.    Cardiovascular: Negative for chest pain and palpitations.   Gastrointestinal: Positive for constipation and diarrhea. Negative for blood in stool and vomiting.   Endocrine: Negative for polydipsia and polyuria.   Genitourinary: Negative for difficulty urinating, dysuria, hematuria and menstrual problem.   Musculoskeletal: Negative for arthralgias, joint swelling and neck pain.   Neurological: Negative for weakness and headaches.   Psychiatric/Behavioral: Negative for confusion and dysphoric mood.     Entered by patient and reviewed and  "updated during visit    Objective:    Vitals:  Vitals:    08/28/18 1518   BP: 138/84   Pulse: 76   Weight: 88.2 kg (194 lb 5.4 oz)   Height: 5' 7" (1.702 m)   PainSc: 0-No pain       Physical Exam   Constitutional: She is oriented to person, place, and time. She appears well-developed and well-nourished. No distress.   HENT:   Mouth/Throat: Oropharynx is clear and moist. No oropharyngeal exudate.   Eyes: Conjunctivae are normal. Right eye exhibits no discharge. Left eye exhibits no discharge. No scleral icterus.   Neck: Neck supple. No tracheal deviation present. No thyromegaly present.   Cardiovascular: Normal rate, regular rhythm, normal heart sounds and intact distal pulses.   No murmur heard.  Pulmonary/Chest: Effort normal and breath sounds normal. No respiratory distress. She has no wheezes. She has no rales.   Abdominal: Soft. She exhibits no distension.   Musculoskeletal: She exhibits no edema.   Lymphadenopathy:     She has no cervical adenopathy.   Neurological: She is alert and oriented to person, place, and time.   Skin: Skin is warm and dry. She is not diaphoretic.   Psychiatric: She has a normal mood and affect. Her behavior is normal. Judgment normal.   Vitals reviewed.      Data:  Previous labs reviewed and pertinent for borderline anemia with macrocytosis.  CMP WNL. LDL elevated. Vitamin D low. A1c and TSH WNL.       Didi Conteh MD  Internal Medicine    "

## 2018-09-06 ENCOUNTER — HOSPITAL ENCOUNTER (OUTPATIENT)
Dept: RADIOLOGY | Facility: HOSPITAL | Age: 43
Discharge: HOME | End: 2018-09-06
Attending: INTERNAL MEDICINE
Payer: COMMERCIAL

## 2018-09-06 DIAGNOSIS — Z00.00 PREVENTATIVE HEALTH CARE: ICD-10-CM

## 2018-09-06 DIAGNOSIS — Z12.31 VISIT FOR SCREENING MAMMOGRAM: ICD-10-CM

## 2018-09-06 PROCEDURE — 77067 SCR MAMMO BI INCL CAD: CPT | Mod: TC,PO

## 2018-09-06 PROCEDURE — 77067 SCR MAMMO BI INCL CAD: CPT | Mod: 26,,, | Performed by: RADIOLOGY

## 2018-09-06 PROCEDURE — 77063 BREAST TOMOSYNTHESIS BI: CPT | Mod: 26,,, | Performed by: RADIOLOGY

## 2018-12-12 DIAGNOSIS — F33.41 RECURRENT MAJOR DEPRESSIVE DISORDER, IN PARTIAL REMISSION: Chronic | ICD-10-CM

## 2018-12-12 RX ORDER — BUPROPION HYDROCHLORIDE 150 MG/1
300 TABLET ORAL DAILY
Qty: 60 TABLET | Refills: 2 | Status: SHIPPED | OUTPATIENT
Start: 2018-12-12 | End: 2019-01-16 | Stop reason: SDUPTHER

## 2019-01-16 DIAGNOSIS — J45.20 RAD (REACTIVE AIRWAY DISEASE), MILD INTERMITTENT, UNCOMPLICATED: ICD-10-CM

## 2019-01-16 DIAGNOSIS — F33.41 RECURRENT MAJOR DEPRESSIVE DISORDER, IN PARTIAL REMISSION: Chronic | ICD-10-CM

## 2019-01-16 RX ORDER — BUPROPION HYDROCHLORIDE 150 MG/1
300 TABLET ORAL DAILY
Qty: 60 TABLET | Refills: 2 | Status: SHIPPED | OUTPATIENT
Start: 2019-01-16 | End: 2019-07-09

## 2019-01-16 RX ORDER — ALBUTEROL SULFATE 90 UG/1
2 AEROSOL, METERED RESPIRATORY (INHALATION) EVERY 6 HOURS PRN
Qty: 18 G | Refills: 5 | Status: SHIPPED | OUTPATIENT
Start: 2019-01-16 | End: 2020-03-16 | Stop reason: SDUPTHER

## 2019-02-01 ENCOUNTER — OFFICE VISIT (OUTPATIENT)
Dept: FAMILY MEDICINE | Facility: CLINIC | Age: 44
End: 2019-02-01
Payer: COMMERCIAL

## 2019-02-01 VITALS
HEART RATE: 91 BPM | SYSTOLIC BLOOD PRESSURE: 124 MMHG | BODY MASS INDEX: 31.67 KG/M2 | HEIGHT: 67 IN | DIASTOLIC BLOOD PRESSURE: 72 MMHG | WEIGHT: 201.81 LBS

## 2019-02-01 DIAGNOSIS — F33.41 RECURRENT MAJOR DEPRESSIVE DISORDER, IN PARTIAL REMISSION: Chronic | ICD-10-CM

## 2019-02-01 DIAGNOSIS — G47.00 INSOMNIA, UNSPECIFIED TYPE: Primary | ICD-10-CM

## 2019-02-01 PROCEDURE — 99999 PR PBB SHADOW E&M-EST. PATIENT-LVL III: CPT | Mod: PBBFAC,,, | Performed by: INTERNAL MEDICINE

## 2019-02-01 PROCEDURE — 99999 PR PBB SHADOW E&M-EST. PATIENT-LVL III: ICD-10-PCS | Mod: PBBFAC,,, | Performed by: INTERNAL MEDICINE

## 2019-02-01 PROCEDURE — 99214 PR OFFICE/OUTPT VISIT, EST, LEVL IV, 30-39 MIN: ICD-10-PCS | Mod: S$GLB,,, | Performed by: INTERNAL MEDICINE

## 2019-02-01 PROCEDURE — 3008F BODY MASS INDEX DOCD: CPT | Mod: CPTII,S$GLB,, | Performed by: INTERNAL MEDICINE

## 2019-02-01 PROCEDURE — 3008F PR BODY MASS INDEX (BMI) DOCUMENTED: ICD-10-PCS | Mod: CPTII,S$GLB,, | Performed by: INTERNAL MEDICINE

## 2019-02-01 PROCEDURE — 99214 OFFICE O/P EST MOD 30 MIN: CPT | Mod: S$GLB,,, | Performed by: INTERNAL MEDICINE

## 2019-02-01 RX ORDER — DIPHENHYDRAMINE HCL 25 MG
25 CAPSULE ORAL EVERY 6 HOURS PRN
COMMUNITY
End: 2019-07-09

## 2019-02-01 RX ORDER — AMITRIPTYLINE HYDROCHLORIDE 10 MG/1
10 TABLET, FILM COATED ORAL NIGHTLY PRN
Qty: 30 TABLET | Refills: 1 | Status: SHIPPED | OUTPATIENT
Start: 2019-02-01 | End: 2019-07-09

## 2019-02-01 NOTE — PROGRESS NOTES
Assessment and Plan:    1. Recurrent major depressive disorder, in partial remission    2. Insomnia, unspecified type  - amitriptyline (ELAVIL) 10 MG tablet; Take 1 tablet (10 mg total) by mouth nightly as needed for Insomnia.  Dispense: 30 tablet; Refill: 1    Discussed sleep hygiene, in particular discussed decreased EtOH use before bed and discussed not looking at screens before bedtime. Will decrease dose of wellbutrin back to 150 mg and discussed taking this in the morning as this could be causing insomnia. Trial of low dose TCA for sleep with plan to increase if tolerated.     ______________________________________________________________________  Subjective:    Chief Complaint:  Insomnia    HPI:  Ivory is a 43 y.o. year old woman here to discuss insomnia.     She has been having a lot of trouble falling asleep and also with staying asleep. She has been taking benadryl, but finds that this is no longer helpful. She reports that she has always had some amount of insomnia, but has been getting worse in the last few months. She has been taking the wellbutrin 2 pills daily but reports that she frequently takes this later in the day.     She had tried trazodone for insomnia in the past, but this made her feel terrible.     She takes benadryl or dramamine about 5 days a week to help with sleep. Frequently drinks a glass or two of wine to help with sleep but reports she is aware this does not help. She does regularly watch TV right before bed. Sleep schedule is erratic with her work shifts.     Medications:  Current Outpatient Medications on File Prior to Visit   Medication Sig Dispense Refill    albuterol (PROVENTIL/VENTOLIN HFA) 90 mcg/actuation inhaler Inhale 2 puffs into the lungs every 6 (six) hours as needed for Wheezing. 18 g 5    buPROPion (WELLBUTRIN XL) 150 MG TB24 tablet Take 2 tablets (300 mg total) by mouth once daily. 60 tablet 2    diphenhydrAMINE (BENADRYL) 25 mg capsule Take 25 mg by mouth every 6  "(six) hours as needed for Itching.      diphenhydramine HCl (UNISOM, DIPHENHYDRAMINE, ORAL) Take by mouth.      multivitamin capsule Take 1 capsule by mouth once daily.      hydrocortisone 2.5 % cream Apply topically 2 (two) times daily. for 10 days 1 Tube 2     No current facility-administered medications on file prior to visit.        Review of Systems:  Review of Systems   Neurological: Negative for dizziness and light-headedness.   Psychiatric/Behavioral: Positive for sleep disturbance. Negative for agitation, behavioral problems and dysphoric mood.       Past Medical History:  Past Medical History:   Diagnosis Date    Abdominal mass, left lower quadrant 11/2016    Anxiety disorder 2010    Asthma     Bilateral ovarian cysts     Cancer antigen 125 () elevation 11/2016    High cholesterol     History of hemorrhoids     Incisional abscess     right breast I/D abscess 2012    MTHFR mutation     Thrombocytopenia complicating pregnancy 2010 and 2012       Objective:    Vitals:  Vitals:    02/01/19 1422   BP: 124/72   Pulse: 91   Weight: 91.6 kg (201 lb 13.3 oz)   Height: 5' 7" (1.702 m)   PainSc: 0-No pain       Physical Exam   Constitutional: She is oriented to person, place, and time. She appears well-developed and well-nourished. No distress.   HENT:   Mouth/Throat: Oropharynx is clear and moist.   Eyes: Conjunctivae are normal. Right eye exhibits no discharge. Left eye exhibits no discharge.   Cardiovascular: Normal rate and regular rhythm.   Pulmonary/Chest: Effort normal. No respiratory distress.   Neurological: She is alert and oriented to person, place, and time.   Skin: Skin is warm and dry.   Psychiatric: She has a normal mood and affect. Her behavior is normal. Judgment and thought content normal.   Vitals reviewed.      Data:  Previous labs reviewed and pertinent for TSH WNL 8/18.      Didi Conteh MD  Internal Medicine  "

## 2019-07-01 ENCOUNTER — OFFICE VISIT (OUTPATIENT)
Dept: URGENT CARE | Facility: CLINIC | Age: 44
End: 2019-07-01
Payer: COMMERCIAL

## 2019-07-01 VITALS
RESPIRATION RATE: 17 BRPM | HEART RATE: 90 BPM | WEIGHT: 200 LBS | OXYGEN SATURATION: 98 % | BODY MASS INDEX: 31.39 KG/M2 | SYSTOLIC BLOOD PRESSURE: 122 MMHG | DIASTOLIC BLOOD PRESSURE: 85 MMHG | TEMPERATURE: 99 F | HEIGHT: 67 IN

## 2019-07-01 DIAGNOSIS — R05.9 COUGH: Primary | ICD-10-CM

## 2019-07-01 PROCEDURE — 3008F PR BODY MASS INDEX (BMI) DOCUMENTED: ICD-10-PCS | Mod: CPTII,S$GLB,, | Performed by: FAMILY MEDICINE

## 2019-07-01 PROCEDURE — 99214 PR OFFICE/OUTPT VISIT, EST, LEVL IV, 30-39 MIN: ICD-10-PCS | Mod: 25,S$GLB,, | Performed by: FAMILY MEDICINE

## 2019-07-01 PROCEDURE — 96372 THER/PROPH/DIAG INJ SC/IM: CPT | Mod: S$GLB,,, | Performed by: FAMILY MEDICINE

## 2019-07-01 PROCEDURE — 99214 OFFICE O/P EST MOD 30 MIN: CPT | Mod: 25,S$GLB,, | Performed by: FAMILY MEDICINE

## 2019-07-01 PROCEDURE — 3008F BODY MASS INDEX DOCD: CPT | Mod: CPTII,S$GLB,, | Performed by: FAMILY MEDICINE

## 2019-07-01 PROCEDURE — 96372 PR INJECTION,THERAP/PROPH/DIAG2ST, IM OR SUBCUT: ICD-10-PCS | Mod: S$GLB,,, | Performed by: FAMILY MEDICINE

## 2019-07-01 RX ORDER — BETAMETHASONE SODIUM PHOSPHATE AND BETAMETHASONE ACETATE 3; 3 MG/ML; MG/ML
9 INJECTION, SUSPENSION INTRA-ARTICULAR; INTRALESIONAL; INTRAMUSCULAR; SOFT TISSUE ONCE
Status: COMPLETED | OUTPATIENT
Start: 2019-07-01 | End: 2019-07-01

## 2019-07-01 RX ADMIN — BETAMETHASONE SODIUM PHOSPHATE AND BETAMETHASONE ACETATE 9 MG: 3; 3 INJECTION, SUSPENSION INTRA-ARTICULAR; INTRALESIONAL; INTRAMUSCULAR; SOFT TISSUE at 03:07

## 2019-07-01 NOTE — PROGRESS NOTES
"Subjective:       Patient ID: Ivory Cade is a 44 y.o. female.    Vitals:  height is 5' 7" (1.702 m) and weight is 90.7 kg (200 lb). Her tympanic temperature is 98.9 °F (37.2 °C). Her blood pressure is 122/85 and her pulse is 90. Her respiration is 17 and oxygen saturation is 98%.     Chief Complaint: Cough    Pt c/o cough, started one week ago, headaches, chest tightness, using albuterol inhaler with no relief    Cough   This is a new problem. The current episode started in the past 7 days. The problem occurs constantly. Associated symptoms include chest pain, headaches, postnasal drip and shortness of breath. Pertinent negatives include no wheezing. Nothing aggravates the symptoms. Treatments tried: inhaler. The treatment provided no relief.       HENT: Positive for postnasal drip, sinus pain and sinus pressure.    Cardiovascular: Positive for chest pain.   Respiratory: Positive for cough and shortness of breath. Negative for wheezing.    Neurological: Positive for dizziness and headaches.       Objective:      Physical Exam   Constitutional: She is oriented to person, place, and time. She appears well-developed and well-nourished. She is cooperative.  Non-toxic appearance. She does not appear ill. No distress.   HENT:   Head: Normocephalic and atraumatic.   Right Ear: Hearing, tympanic membrane, external ear and ear canal normal.   Left Ear: Hearing, tympanic membrane, external ear and ear canal normal.   Nose: Nose normal. No mucosal edema, rhinorrhea or nasal deformity. No epistaxis. Right sinus exhibits no maxillary sinus tenderness and no frontal sinus tenderness. Left sinus exhibits no maxillary sinus tenderness and no frontal sinus tenderness.   Mouth/Throat: Uvula is midline, oropharynx is clear and moist and mucous membranes are normal. No trismus in the jaw. Normal dentition. No uvula swelling. No posterior oropharyngeal erythema.   Eyes: Conjunctivae and lids are normal. No scleral icterus. "   Sclera clear bilat   Neck: Trachea normal, full passive range of motion without pain and phonation normal. Neck supple.   Cardiovascular: Normal rate, regular rhythm, normal heart sounds, intact distal pulses and normal pulses.   Pulmonary/Chest: Effort normal and breath sounds normal. No respiratory distress.   Cough     Abdominal: Normal appearance. She exhibits no distension. There is no tenderness.   Musculoskeletal: Normal range of motion. She exhibits no edema or deformity.   Neurological: She is alert and oriented to person, place, and time. She exhibits normal muscle tone. Coordination normal.   Skin: Skin is warm, dry and intact. She is not diaphoretic. No pallor.   Psychiatric: She has a normal mood and affect. Her speech is normal and behavior is normal. Judgment and thought content normal. Cognition and memory are normal.   Nursing note and vitals reviewed.      Assessment:       1. Cough        Plan:         Cough    Other orders  -     betamethasone acetate-betamethasone sodium phosphate injection 9 mg        pt states was hoping to get steroid to help with cough.

## 2019-07-04 ENCOUNTER — TELEPHONE (OUTPATIENT)
Dept: URGENT CARE | Facility: CLINIC | Age: 44
End: 2019-07-04

## 2019-07-09 ENCOUNTER — OFFICE VISIT (OUTPATIENT)
Dept: FAMILY MEDICINE | Facility: CLINIC | Age: 44
End: 2019-07-09
Payer: COMMERCIAL

## 2019-07-09 VITALS
WEIGHT: 189.94 LBS | BODY MASS INDEX: 29.81 KG/M2 | DIASTOLIC BLOOD PRESSURE: 68 MMHG | SYSTOLIC BLOOD PRESSURE: 118 MMHG | TEMPERATURE: 99 F | HEIGHT: 67 IN | OXYGEN SATURATION: 96 % | HEART RATE: 98 BPM

## 2019-07-09 DIAGNOSIS — J20.9 ACUTE BRONCHITIS, UNSPECIFIED ORGANISM: Primary | ICD-10-CM

## 2019-07-09 PROCEDURE — 99214 PR OFFICE/OUTPT VISIT, EST, LEVL IV, 30-39 MIN: ICD-10-PCS | Mod: S$GLB,,, | Performed by: NURSE PRACTITIONER

## 2019-07-09 PROCEDURE — 99999 PR PBB SHADOW E&M-EST. PATIENT-LVL IV: ICD-10-PCS | Mod: PBBFAC,,, | Performed by: NURSE PRACTITIONER

## 2019-07-09 PROCEDURE — 3008F BODY MASS INDEX DOCD: CPT | Mod: CPTII,S$GLB,, | Performed by: NURSE PRACTITIONER

## 2019-07-09 PROCEDURE — 3008F PR BODY MASS INDEX (BMI) DOCUMENTED: ICD-10-PCS | Mod: CPTII,S$GLB,, | Performed by: NURSE PRACTITIONER

## 2019-07-09 PROCEDURE — 99999 PR PBB SHADOW E&M-EST. PATIENT-LVL IV: CPT | Mod: PBBFAC,,, | Performed by: NURSE PRACTITIONER

## 2019-07-09 PROCEDURE — 99214 OFFICE O/P EST MOD 30 MIN: CPT | Mod: S$GLB,,, | Performed by: NURSE PRACTITIONER

## 2019-07-09 RX ORDER — BENZONATATE 200 MG/1
200 CAPSULE ORAL 3 TIMES DAILY PRN
Qty: 30 CAPSULE | Refills: 0 | Status: SHIPPED | OUTPATIENT
Start: 2019-07-09 | End: 2019-07-19

## 2019-07-09 NOTE — PATIENT INSTRUCTIONS
mucinex thins secretions.  Delsym (Dextromethorphan) suppresses cough. It works in your brain.  Tessalon works in the lung on the stretch receptors(like a reflex).  Take zyrtec or benadryl at night for post nasal drip.

## 2019-07-09 NOTE — PROGRESS NOTES
This dictation has been generated using Modal Fluency Dictation some phonetic errors may occur. Please contact author for clarification if needed.     Problem List Items Addressed This Visit     None      Visit Diagnoses     Acute bronchitis, unspecified organism    -  Primary          Orders Placed This Encounter    benzonatate (TESSALON) 200 MG capsule     Patient Instructions   mucinex thins secretions.  Delsym (Dextromethorphan) suppresses cough. It works in your brain.  Tessalon works in the lung on the stretch receptors(like a reflex).  Take zyrtec or benadryl at night for post nasal drip.     Cough complete Zithromax.  Consider pertussis.  Tdap approximately 7 years ago.  Follow up if symptoms worsen or fail to improve.    ________________________________________________________________  ________________________________________________________________      Chief Complaint   Patient presents with    Cough     sob, went to urgent care [facility] records being sent     History of present illness  This 44 y.o. presents today for complaint of cough.  Symptoms have been present for more than a week.  She went to urgent care yesterday.  They did start Zithromax.  They refilled her albuterol inhaler.  She notes brief relief from albuterol.  She was going to take some Tessalon this morning however noted that they were .  Will get her refill on that.  While she was at the office the nurse practitioner called and I over heard the phone call stating that patient had no abnormal findings on her chest x-ray.  We did have her sign a record release for information.  She is currently taking Zithromax.  She has had steroid injection.  She notes some improvement in symptoms today.  We discussed therapies to manage cough better.  Expect cough the last 2-4 weeks.  If symptoms worsen return to clinic.  Patient notes areas of rash that occur after shaving on the legs.  It looks like she has a little bit of folliculitis.   We discussed options to manage.  She has some Bactroban ointment at home which would be helpful on the areas of acute outbreak.  We discussed cleaning legs prior to shaving and do not shave against the grain.  She denies any fever or chills.  Denies rash elsewhere.  Patient denies any pain.  Complete review of system otherwise negative    Past medical and social history reviewed.    Past Medical History:   Diagnosis Date    Abdominal mass, left lower quadrant 11/2016    Anxiety disorder 2010    Asthma     Bilateral ovarian cysts     Cancer antigen 125 () elevation 11/2016    High cholesterol     History of hemorrhoids     Incisional abscess     right breast I/D abscess 2012    MTHFR mutation     Thrombocytopenia complicating pregnancy 2010 and 2012       Past Surgical History:   Procedure Laterality Date    BREAST BIOPSY Right 2011    BREAST CYST INCISION AND DRAINAGE      right breast abscess I/D 2012    COLONOSCOPY N/A 2/21/2014    [facility]    CYSTOSCOPY N/A 11/28/2016    [facility]    essure  2012    HYSTERECTOMY      partial - secondary to ovarian mass -benign     ROBOT ASSISTED LAPAROSCOPIC OOPHERECTOMY Left 11/28/2016    [facility] OR    ROBOTIC ASSISTED LAPAROSCOPIC HYSTERECTOMY - FROZEN SECTION N/A 11/28/2016    [facility] OR    SALPINGECTOMY-ROBOTIC ASSISTED LAPAROSCOPIC Bilateral 11/28/2016    [facility] OR       Family History   Problem Relation Age of Onset    Celiac disease Maternal Aunt     Hepatitis Maternal Aunt         autoimmune    Colon cancer Maternal Grandmother     Cancer Maternal Grandmother     Hepatitis Mother         autoimmune    Hypertension Mother     Pulmonary embolism Mother     Hypertension Father     Hyperlipidemia Father     Heart disease Father     Early death Father 53         MI    No Known Problems Brother     Breast cancer Neg Hx     Diabetes Neg Hx     Miscarriages / Stillbirths Neg Hx     Ovarian cancer Neg Hx     Stroke Neg Hx        Social History     Socioeconomic History    Marital status:      Spouse name: Not on file    Number of children: 2    Years of education: Not on file    Highest education level: Not on file   Occupational History    Not on file   Social Needs    Financial resource strain: Not on file    Food insecurity:     Worry: Not on file     Inability: Not on file    Transportation needs:     Medical: Not on file     Non-medical: Not on file   Tobacco Use    Smoking status: Former Smoker     Packs/day: 0.00    Smokeless tobacco: Never Used   Substance and Sexual Activity    Alcohol use: Yes     Alcohol/week: 4.2 oz     Types: 7 Glasses of wine per week    Drug use: No    Sexual activity: Yes     Partners: Male   Lifestyle    Physical activity:     Days per week: Not on file     Minutes per session: Not on file    Stress: Not on file   Relationships    Social connections:     Talks on phone: Not on file     Gets together: Not on file     Attends Jain service: Not on file     Active member of club or organization: Not on file     Attends meetings of clubs or organizations: Not on file     Relationship status: Not on file   Other Topics Concern    Not on file   Social History Narrative     asked for separation and pt and 2 children moved out a month ago, (July 2015) now live in apartment. 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        Current Outpatient Medications   Medication Sig Dispense Refill    albuterol (PROVENTIL/VENTOLIN HFA) 90 mcg/actuation inhaler Inhale 2 puffs into the lungs every 6 (six) hours as needed for Wheezing. 18 g 5    benzonatate (TESSALON) 200 MG capsule Take 1 capsule (200 mg total) by mouth 3 (three) times daily as needed for Cough. 30  capsule 0    diphenhydramine HCl (UNISOM, DIPHENHYDRAMINE, ORAL) Take by mouth.      multivitamin capsule Take 1 capsule by mouth once daily.       No current facility-administered medications for this visit.        Review of patient's allergies indicates:   Allergen Reactions    Penicillins Hives       Physical examination  Vitals Reviewed  Gen. Well-dressed well-nourished   Skin warm dry and intact.  Scattered small erythematous areas without abscess collection or drainage observed.  HEENT.  TM intact bilateral with normal light reflex.  No mastoid tenderness during percussion.  Nares patent bilateral.  Pharynx is unremarkable postnasal drip.  No maxillary or frontal sinus tenderness when percussed.    Neck is supple without adenopathy  Chest.  Respirations are even unlabored.  Lungs are clear to auscultation.  Cardiac regular rate and rhythm.  No chest wall adenopathy noted.  Neuro. Awake alert oriented x4.  Normal judgment and cognition noted.  Extremities no clubbing cyanosis or edema noted.     Call or return to clinic prn if these symptoms worsen or fail to improve as anticipated.  In excessive of 25 minutes spent with patient with greater than 50% of time dedicated to education on symptoms, diagnosis, treatments, and coordination of care.

## 2019-07-18 ENCOUNTER — PATIENT MESSAGE (OUTPATIENT)
Dept: FAMILY MEDICINE | Facility: CLINIC | Age: 44
End: 2019-07-18

## 2019-09-23 ENCOUNTER — PATIENT MESSAGE (OUTPATIENT)
Dept: FAMILY MEDICINE | Facility: CLINIC | Age: 44
End: 2019-09-23

## 2019-09-23 ENCOUNTER — TELEPHONE (OUTPATIENT)
Dept: FAMILY MEDICINE | Facility: CLINIC | Age: 44
End: 2019-09-23

## 2019-09-23 DIAGNOSIS — Z00.00 PREVENTATIVE HEALTH CARE: Primary | ICD-10-CM

## 2019-09-25 ENCOUNTER — HOSPITAL ENCOUNTER (OUTPATIENT)
Dept: RADIOLOGY | Facility: HOSPITAL | Age: 44
Discharge: HOME | End: 2019-09-25
Attending: INTERNAL MEDICINE
Payer: COMMERCIAL

## 2019-09-25 VITALS — BODY MASS INDEX: 29.79 KG/M2 | HEIGHT: 67 IN | WEIGHT: 189.81 LBS

## 2019-09-25 DIAGNOSIS — Z00.00 PREVENTATIVE HEALTH CARE: ICD-10-CM

## 2019-09-25 PROCEDURE — 77063 MAMMO DIGITAL SCREENING BILAT WITH TOMOSYNTHESIS_CAD: ICD-10-PCS | Mod: 26,,, | Performed by: RADIOLOGY

## 2019-09-25 PROCEDURE — 77067 MAMMO DIGITAL SCREENING BILAT WITH TOMOSYNTHESIS_CAD: ICD-10-PCS | Mod: 26,,, | Performed by: RADIOLOGY

## 2019-09-25 PROCEDURE — 77067 SCR MAMMO BI INCL CAD: CPT | Mod: 26,,, | Performed by: RADIOLOGY

## 2019-09-25 PROCEDURE — 77067 SCR MAMMO BI INCL CAD: CPT | Mod: TC,PN

## 2019-09-25 PROCEDURE — 77063 BREAST TOMOSYNTHESIS BI: CPT | Mod: 26,,, | Performed by: RADIOLOGY

## 2020-03-12 ENCOUNTER — OFFICE VISIT (OUTPATIENT)
Dept: FAMILY MEDICINE | Facility: CLINIC | Age: 45
End: 2020-03-12
Payer: COMMERCIAL

## 2020-03-12 VITALS
BODY MASS INDEX: 28.39 KG/M2 | HEIGHT: 67 IN | SYSTOLIC BLOOD PRESSURE: 138 MMHG | WEIGHT: 180.88 LBS | DIASTOLIC BLOOD PRESSURE: 96 MMHG

## 2020-03-12 DIAGNOSIS — G47.00 INSOMNIA, UNSPECIFIED TYPE: ICD-10-CM

## 2020-03-12 DIAGNOSIS — E78.5 DYSLIPIDEMIA: ICD-10-CM

## 2020-03-12 DIAGNOSIS — I10 ESSENTIAL HYPERTENSION: Primary | ICD-10-CM

## 2020-03-12 PROCEDURE — 3075F PR MOST RECENT SYSTOLIC BLOOD PRESS GE 130-139MM HG: ICD-10-PCS | Mod: CPTII,S$GLB,, | Performed by: FAMILY MEDICINE

## 2020-03-12 PROCEDURE — 3008F BODY MASS INDEX DOCD: CPT | Mod: CPTII,S$GLB,, | Performed by: FAMILY MEDICINE

## 2020-03-12 PROCEDURE — 3080F DIAST BP >= 90 MM HG: CPT | Mod: CPTII,S$GLB,, | Performed by: FAMILY MEDICINE

## 2020-03-12 PROCEDURE — 3075F SYST BP GE 130 - 139MM HG: CPT | Mod: CPTII,S$GLB,, | Performed by: FAMILY MEDICINE

## 2020-03-12 PROCEDURE — 99999 PR PBB SHADOW E&M-EST. PATIENT-LVL III: CPT | Mod: PBBFAC,,, | Performed by: FAMILY MEDICINE

## 2020-03-12 PROCEDURE — 99214 PR OFFICE/OUTPT VISIT, EST, LEVL IV, 30-39 MIN: ICD-10-PCS | Mod: S$GLB,,, | Performed by: FAMILY MEDICINE

## 2020-03-12 PROCEDURE — 3008F PR BODY MASS INDEX (BMI) DOCUMENTED: ICD-10-PCS | Mod: CPTII,S$GLB,, | Performed by: FAMILY MEDICINE

## 2020-03-12 PROCEDURE — 99214 OFFICE O/P EST MOD 30 MIN: CPT | Mod: S$GLB,,, | Performed by: FAMILY MEDICINE

## 2020-03-12 PROCEDURE — 99999 PR PBB SHADOW E&M-EST. PATIENT-LVL III: ICD-10-PCS | Mod: PBBFAC,,, | Performed by: FAMILY MEDICINE

## 2020-03-12 PROCEDURE — 3080F PR MOST RECENT DIASTOLIC BLOOD PRESSURE >= 90 MM HG: ICD-10-PCS | Mod: CPTII,S$GLB,, | Performed by: FAMILY MEDICINE

## 2020-03-12 RX ORDER — METOPROLOL SUCCINATE 50 MG/1
50 TABLET, EXTENDED RELEASE ORAL DAILY
Qty: 30 TABLET | Refills: 11 | Status: SHIPPED | OUTPATIENT
Start: 2020-03-12 | End: 2021-03-01

## 2020-03-12 RX ORDER — MIRTAZAPINE 30 MG/1
30 TABLET, FILM COATED ORAL NIGHTLY
Qty: 30 TABLET | Refills: 11 | Status: SHIPPED | OUTPATIENT
Start: 2020-03-12 | End: 2020-10-06

## 2020-03-12 NOTE — PROGRESS NOTES
THIS DOCUMENT WAS MADE IN PART WITH VOICE RECOGNITION SOFTWARE.  OCCASIONALLY THIS SOFTWARE WILL MISINTERPRET WORDS OR PHRASES.    Assessment and Plan:    1. Essential hypertension  Uncontrolled, New Rx  Check at home  Discussed low sodium diet / exercise/ smoking cessation  - metoprolol succinate (TOPROL-XL) 50 MG 24 hr tablet; Take 1 tablet (50 mg total) by mouth once daily.  Dispense: 30 tablet; Refill: 11  - Lipid panel; Future    2. Insomnia, unspecified type  Failed elavil / trazodone  - mirtazapine (REMERON) 30 MG tablet; Take 1 tablet (30 mg total) by mouth every evening.  Dispense: 30 tablet; Refill: 11    3. Dyslipidemia  - Comprehensive metabolic panel; Future  Recommended Fish Oil BID w/ EPA+DHA > 800MG  ______________________________________________________________________  Subjective:    Chief Complaint:  Chief Complaint   Patient presents with    Annual Exam        HPI:  Ivory is a 44 y.o. year old     Dyslipidemia  Labs 09/24/2019-total cholesterol 260, , HDL 58, triglycerides 117  No current medications    Hypertension  Been complaining about HA and elevated bp over last few weeks  Denies any CP or SOB  Recently started smoking again   Been to ED     Insomnia   Tried trazodone, amitriptyline in the past which failed.  Defers any having her medications.  Chronically issue    Past Medical History:  Past Medical History:   Diagnosis Date    Abdominal mass, left lower quadrant 11/2016    Anxiety disorder 2010    Asthma     Bilateral ovarian cysts     Cancer antigen 125 () elevation 11/2016    High cholesterol     History of hemorrhoids     Incisional abscess     right breast I/D abscess 2012    MTHFR mutation     Thrombocytopenia complicating pregnancy 2010 and 2012       Past Surgical History:  Past Surgical History:   Procedure Laterality Date    BREAST BIOPSY Right 2011    BREAST CYST INCISION AND DRAINAGE      right breast abscess I/D 2012    essure  2012    HYSTERECTOMY       partial - secondary to ovarian mass -benign        Family History:  Family History   Problem Relation Age of Onset    Celiac disease Maternal Aunt     Hepatitis Maternal Aunt         autoimmune    Colon cancer Maternal Grandmother     Cancer Maternal Grandmother     Hepatitis Mother         autoimmune    Hypertension Mother     Pulmonary embolism Mother     Hypertension Father     Hyperlipidemia Father     Heart disease Father     Early death Father 53        MI    No Known Problems Brother     Breast cancer Neg Hx     Diabetes Neg Hx     Miscarriages / Stillbirths Neg Hx     Ovarian cancer Neg Hx     Stroke Neg Hx        Social History:  Social History     Socioeconomic History    Marital status:      Spouse name: Not on file    Number of children: 2    Years of education: Not on file    Highest education level: Not on file   Occupational History    Not on file   Social Needs    Financial resource strain: Not on file    Food insecurity:     Worry: Not on file     Inability: Not on file    Transportation needs:     Medical: Not on file     Non-medical: Not on file   Tobacco Use    Smoking status: Former Smoker     Packs/day: 0.00    Smokeless tobacco: Never Used   Substance and Sexual Activity    Alcohol use: Yes     Alcohol/week: 7.0 standard drinks     Types: 7 Glasses of wine per week    Drug use: No    Sexual activity: Yes     Partners: Male   Lifestyle    Physical activity:     Days per week: Not on file     Minutes per session: Not on file    Stress: Not on file   Relationships    Social connections:     Talks on phone: Not on file     Gets together: Not on file     Attends Gnosticist service: Not on file     Active member of club or organization: Not on file     Attends meetings of clubs or organizations: Not on file     Relationship status: Not on file   Other Topics Concern    Not on file   Social History Narrative     asked for separation and pt and 2  "children moved out a month ago, (July 2015) now live in apartment. 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        Medications:  Current Outpatient Medications on File Prior to Visit   Medication Sig Dispense Refill    albuterol (PROVENTIL/VENTOLIN HFA) 90 mcg/actuation inhaler Inhale 2 puffs into the lungs every 6 (six) hours as needed for Wheezing. 18 g 5    diphenhydramine HCl (UNISOM, DIPHENHYDRAMINE, ORAL) Take by mouth.      multivitamin capsule Take 1 capsule by mouth once daily.       No current facility-administered medications on file prior to visit.        Allergies:  Penicillins    Immunizations:  Immunization History   Administered Date(s) Administered    Pneumococcal Polysaccharide - 23 Valent 01/26/2017    Tdap 03/29/2012       Review of Systems:  Review of Systems   All other systems reviewed and are negative.      Objective:    Vitals:  Vitals:    03/12/20 1301 03/12/20 1339   BP: (!) 138/100 (!) 138/96   Weight: 82.1 kg (180 lb 14.2 oz)    Height: 5' 7" (1.702 m)        Physical Exam   Constitutional: No distress.   HENT:   Head: Normocephalic and atraumatic.   Eyes: Pupils are equal, round, and reactive to light. EOM are normal.   Neck: Neck supple.   Cardiovascular: Normal rate and regular rhythm. Exam reveals no friction rub.   No murmur heard.  Pulmonary/Chest: Effort normal and breath sounds normal.   Abdominal: Soft. Bowel sounds are normal. She exhibits no distension. There is no tenderness.   Skin: Skin is warm and dry. No rash noted.   Psychiatric: She has a normal mood and affect. Her behavior is normal.       Data:  No previous labs, imaging, or notes available.        Douglas Holder MD  Family Medicine    "

## 2020-03-12 NOTE — LETTER
March 12, 2020      [facility] [address]  [address]  [place] [address]  Phone: [phone]  Fax: [phone]          Patient: Ivory Cade   MR Number: [id number]   YOB: 1975   Date of Visit: 3/12/2020       Dear [facility]:    Thank you for referring Ivory Cade to me for evaluation. Attached you will find relevant portions of my assessment and plan of care.    If you have questions, please do not hesitate to call me. I look forward to following Ivory Cade along with you.    Sincerely,    Douglas Holder MD    Enclosure  CC:  No Recipients    If you would like to receive this communication electronically, please contact [email or web address] or [phone] to request more information on [employer] access.    For providers and/or their staff who would like to refer a patient to [facility], please contact us through our one-stop-shop provider referral line, [facility], at [phone].    If you feel you have received this communication in error or would no longer like to receive these types of communications, please e-mail [email or web address]

## 2020-03-16 DIAGNOSIS — J45.20 RAD (REACTIVE AIRWAY DISEASE), MILD INTERMITTENT, UNCOMPLICATED: ICD-10-CM

## 2020-03-16 RX ORDER — ALBUTEROL SULFATE 90 UG/1
2 AEROSOL, METERED RESPIRATORY (INHALATION) EVERY 6 HOURS PRN
Qty: 18 G | Refills: 5 | Status: SHIPPED | OUTPATIENT
Start: 2020-03-16 | End: 2021-03-24

## 2020-03-26 ENCOUNTER — TELEPHONE (OUTPATIENT)
Dept: FAMILY MEDICINE | Facility: CLINIC | Age: 45
End: 2020-03-26

## 2020-03-26 ENCOUNTER — PATIENT MESSAGE (OUTPATIENT)
Dept: FAMILY MEDICINE | Facility: CLINIC | Age: 45
End: 2020-03-26

## 2020-03-26 NOTE — TELEPHONE ENCOUNTER
Called patient in regards to scheduing her appointment out 30 days or to changed to a virtual MyChart visit due to the COVID-19 concerns. Patient was LVM to return phone call to clinic to reschedule and also sent a [employer] message.

## 2020-04-22 ENCOUNTER — PATIENT MESSAGE (OUTPATIENT)
Dept: FAMILY MEDICINE | Facility: CLINIC | Age: 45
End: 2020-04-22

## 2020-04-23 ENCOUNTER — PATIENT MESSAGE (OUTPATIENT)
Dept: FAMILY MEDICINE | Facility: CLINIC | Age: 45
End: 2020-04-23

## 2020-04-23 NOTE — TELEPHONE ENCOUNTER
From the most recent guidelines I had read, we are not able to test people who are asymptomatic, even if they are a healthcare worker with a known exposure. Has this changed at all?

## 2020-05-06 ENCOUNTER — PATIENT MESSAGE (OUTPATIENT)
Dept: ADMINISTRATIVE | Facility: HOSPITAL | Age: 45
End: 2020-05-06

## 2020-10-02 ENCOUNTER — PATIENT MESSAGE (OUTPATIENT)
Dept: FAMILY MEDICINE | Facility: CLINIC | Age: 45
End: 2020-10-02

## 2020-10-02 DIAGNOSIS — Z12.31 OTHER SCREENING MAMMOGRAM: ICD-10-CM

## 2020-10-06 ENCOUNTER — OFFICE VISIT (OUTPATIENT)
Dept: FAMILY MEDICINE | Facility: CLINIC | Age: 45
End: 2020-10-06
Payer: COMMERCIAL

## 2020-10-06 VITALS
WEIGHT: 211.56 LBS | BODY MASS INDEX: 33.2 KG/M2 | RESPIRATION RATE: 18 BRPM | HEART RATE: 66 BPM | SYSTOLIC BLOOD PRESSURE: 138 MMHG | HEIGHT: 67 IN | TEMPERATURE: 98 F | OXYGEN SATURATION: 97 % | DIASTOLIC BLOOD PRESSURE: 88 MMHG

## 2020-10-06 DIAGNOSIS — F33.41 RECURRENT MAJOR DEPRESSIVE DISORDER, IN PARTIAL REMISSION: Primary | Chronic | ICD-10-CM

## 2020-10-06 DIAGNOSIS — E78.2 MIXED HYPERLIPIDEMIA: ICD-10-CM

## 2020-10-06 DIAGNOSIS — Z00.00 PREVENTATIVE HEALTH CARE: ICD-10-CM

## 2020-10-06 DIAGNOSIS — G47.00 INSOMNIA, UNSPECIFIED TYPE: ICD-10-CM

## 2020-10-06 PROCEDURE — 3079F DIAST BP 80-89 MM HG: CPT | Mod: CPTII,S$GLB,, | Performed by: INTERNAL MEDICINE

## 2020-10-06 PROCEDURE — 3075F PR MOST RECENT SYSTOLIC BLOOD PRESS GE 130-139MM HG: ICD-10-PCS | Mod: CPTII,S$GLB,, | Performed by: INTERNAL MEDICINE

## 2020-10-06 PROCEDURE — 3075F SYST BP GE 130 - 139MM HG: CPT | Mod: CPTII,S$GLB,, | Performed by: INTERNAL MEDICINE

## 2020-10-06 PROCEDURE — 90686 FLU VACCINE (QUAD) GREATER THAN OR EQUAL TO 3YO PRESERVATIVE FREE IM: ICD-10-PCS | Mod: S$GLB,,, | Performed by: INTERNAL MEDICINE

## 2020-10-06 PROCEDURE — 90471 FLU VACCINE (QUAD) GREATER THAN OR EQUAL TO 3YO PRESERVATIVE FREE IM: ICD-10-PCS | Mod: S$GLB,,, | Performed by: INTERNAL MEDICINE

## 2020-10-06 PROCEDURE — 99999 PR PBB SHADOW E&M-EST. PATIENT-LVL III: ICD-10-PCS | Mod: PBBFAC,,, | Performed by: INTERNAL MEDICINE

## 2020-10-06 PROCEDURE — 3079F PR MOST RECENT DIASTOLIC BLOOD PRESSURE 80-89 MM HG: ICD-10-PCS | Mod: CPTII,S$GLB,, | Performed by: INTERNAL MEDICINE

## 2020-10-06 PROCEDURE — 90471 IMMUNIZATION ADMIN: CPT | Mod: S$GLB,,, | Performed by: INTERNAL MEDICINE

## 2020-10-06 PROCEDURE — 99999 PR PBB SHADOW E&M-EST. PATIENT-LVL III: CPT | Mod: PBBFAC,,, | Performed by: INTERNAL MEDICINE

## 2020-10-06 PROCEDURE — 3008F PR BODY MASS INDEX (BMI) DOCUMENTED: ICD-10-PCS | Mod: CPTII,S$GLB,, | Performed by: INTERNAL MEDICINE

## 2020-10-06 PROCEDURE — 99214 PR OFFICE/OUTPT VISIT, EST, LEVL IV, 30-39 MIN: ICD-10-PCS | Mod: 25,S$GLB,, | Performed by: INTERNAL MEDICINE

## 2020-10-06 PROCEDURE — 99214 OFFICE O/P EST MOD 30 MIN: CPT | Mod: 25,S$GLB,, | Performed by: INTERNAL MEDICINE

## 2020-10-06 PROCEDURE — 90686 IIV4 VACC NO PRSV 0.5 ML IM: CPT | Mod: S$GLB,,, | Performed by: INTERNAL MEDICINE

## 2020-10-06 PROCEDURE — 3008F BODY MASS INDEX DOCD: CPT | Mod: CPTII,S$GLB,, | Performed by: INTERNAL MEDICINE

## 2020-10-06 RX ORDER — BUPROPION HYDROCHLORIDE 150 MG/1
150 TABLET ORAL DAILY
Qty: 90 TABLET | Refills: 1 | Status: SHIPPED | OUTPATIENT
Start: 2020-10-06 | End: 2020-11-06 | Stop reason: SDUPTHER

## 2020-10-06 RX ORDER — HYDROXYZINE HYDROCHLORIDE 25 MG/1
25 TABLET, FILM COATED ORAL 3 TIMES DAILY
Qty: 90 TABLET | Refills: 1 | Status: SHIPPED | OUTPATIENT
Start: 2020-10-06 | End: 2020-12-01

## 2020-10-06 NOTE — PROGRESS NOTES
Assessment and Plan:    1. Recurrent major depressive disorder, in partial remission  Discussed medication options for depression and have elected to re-start Wellbutrin. Daily rather than BID formulation due to insomnia. We discussed how to take this medication and the likely time to effect of this medication. We discussed potential side effects and to let me know if she is having any of these side effects. Follow up in 3 months.  - TSH; Future  - buPROPion (WELLBUTRIN XL) 150 MG TB24 tablet; Take 1 tablet (150 mg total) by mouth once daily.  Dispense: 90 tablet; Refill: 1    2. Mixed hyperlipidemia  Not on statin, will work on diet and check labs in 2 months.  - Lipid Panel; Future    3. Insomnia, unspecified type  - TSH; Future  - hydrOXYzine HCL (ATARAX) 25 MG tablet; Take 1 tablet (25 mg total) by mouth 3 (three) times daily.  Dispense: 90 tablet; Refill: 1    4. Preventative health care  - Comprehensive Metabolic Panel; Future  - CBC auto differential; Future  - Lipid Panel; Future  - TSH; Future  - Hepatitis C Antibody; Future      ______________________________________________________________________  Subjective:    Chief Complaint:  Follow up depression    HPI:  Ivory is a 45 y.o. year old female here to follow up about depression.    Mood- Reports that she has been having more trouble with her mood recently and has started smoking again. Wanted to talk about getting back on wellbutrin.    HTN- Prescribed metoprolol after ER visit, continued at last visit and discussed home monitoring as well as smoking cessation. She has not been checking BP at home lately.     Insomnia- Prescribed mirtazepine last visit. Has been helping somewhat when she takes it, only taking this PRN. She has gained 30 lbs since this was started, but notes that she had also been eating more poorly and not exercising. She had lost weight prior to starting this in [place] and notes that she had stopped that diet.     Medications:  Current  "Outpatient Medications on File Prior to Visit   Medication Sig Dispense Refill    albuterol (PROVENTIL/VENTOLIN HFA) 90 mcg/actuation inhaler Inhale 2 puffs into the lungs every 6 (six) hours as needed for Wheezing. 18 g 5    diphenhydramine HCl (UNISOM, DIPHENHYDRAMINE, ORAL) Take by mouth.      metoprolol succinate (TOPROL-XL) 50 MG 24 hr tablet Take 1 tablet (50 mg total) by mouth once daily. 30 tablet 11    mirtazapine (REMERON) 30 MG tablet Take 1 tablet (30 mg total) by mouth every evening. 30 tablet 11    [DISCONTINUED] multivitamin capsule Take 1 capsule by mouth once daily.       No current facility-administered medications on file prior to visit.        Review of Systems:  Review of Systems   Constitutional: Positive for unexpected weight change. Negative for chills and fever.   Cardiovascular: Negative for chest pain and leg swelling.   Neurological: Negative for dizziness and light-headedness.   Psychiatric/Behavioral: Positive for dysphoric mood and sleep disturbance. The patient is not nervous/anxious.        Past Medical History:  Past Medical History:   Diagnosis Date    Abdominal mass, left lower quadrant 11/2016    Anxiety disorder 2010    Asthma     Bilateral ovarian cysts     Cancer antigen 125 () elevation 11/2016    High cholesterol     History of hemorrhoids     Incisional abscess     right breast I/D abscess 2012    MTHFR mutation     Thrombocytopenia complicating pregnancy 2010 and 2012       Objective:    Vitals:  Vitals:    10/06/20 1307   BP: 138/88   Pulse: 66   Resp: 18   Temp: 97.5 °F (36.4 °C)   TempSrc: Temporal   SpO2: 97%   Weight: 95.9 kg (211 lb 8.5 oz)   Height: 5' 7" (1.702 m)   PainSc: 0-No pain       Physical Exam  Vitals signs reviewed.   Constitutional:       General: She is not in acute distress.     Appearance: She is well-developed.   Eyes:      General:         Right eye: No discharge.         Left eye: No discharge.      Conjunctiva/sclera: " Conjunctivae normal.   Cardiovascular:      Rate and Rhythm: Normal rate and regular rhythm.   Pulmonary:      Effort: Pulmonary effort is normal. No respiratory distress.   Skin:     General: Skin is warm and dry.   Neurological:      Mental Status: She is alert and oriented to person, place, and time.   Psychiatric:         Behavior: Behavior normal.         Thought Content: Thought content normal.         Judgment: Judgment normal.          Data:  Previous labs reviewed and pertinent for borderline anemia, elevated LDL.      Didi Conteh MD  Internal Medicine

## 2020-10-22 ENCOUNTER — OFFICE VISIT (OUTPATIENT)
Dept: URGENT CARE | Facility: CLINIC | Age: 45
End: 2020-10-22
Payer: COMMERCIAL

## 2020-10-22 VITALS
HEART RATE: 73 BPM | DIASTOLIC BLOOD PRESSURE: 90 MMHG | WEIGHT: 211 LBS | OXYGEN SATURATION: 98 % | SYSTOLIC BLOOD PRESSURE: 140 MMHG | TEMPERATURE: 97 F | BODY MASS INDEX: 33.12 KG/M2 | RESPIRATION RATE: 18 BRPM | HEIGHT: 67 IN

## 2020-10-22 DIAGNOSIS — R05.9 COUGH: ICD-10-CM

## 2020-10-22 DIAGNOSIS — J40 BRONCHITIS: Primary | ICD-10-CM

## 2020-10-22 PROCEDURE — 96372 PR INJECTION,THERAP/PROPH/DIAG2ST, IM OR SUBCUT: ICD-10-PCS | Mod: S$GLB,,, | Performed by: NURSE PRACTITIONER

## 2020-10-22 PROCEDURE — 96372 THER/PROPH/DIAG INJ SC/IM: CPT | Mod: S$GLB,,, | Performed by: NURSE PRACTITIONER

## 2020-10-22 PROCEDURE — 99214 OFFICE O/P EST MOD 30 MIN: CPT | Mod: 25,S$GLB,, | Performed by: NURSE PRACTITIONER

## 2020-10-22 PROCEDURE — 99214 PR OFFICE/OUTPT VISIT, EST, LEVL IV, 30-39 MIN: ICD-10-PCS | Mod: 25,S$GLB,, | Performed by: NURSE PRACTITIONER

## 2020-10-22 RX ORDER — PREDNISONE 20 MG/1
20 TABLET ORAL DAILY
Qty: 5 TABLET | Refills: 0 | Status: SHIPPED | OUTPATIENT
Start: 2020-10-22 | End: 2020-10-27

## 2020-10-22 RX ORDER — BETAMETHASONE SODIUM PHOSPHATE AND BETAMETHASONE ACETATE 3; 3 MG/ML; MG/ML
6 INJECTION, SUSPENSION INTRA-ARTICULAR; INTRALESIONAL; INTRAMUSCULAR; SOFT TISSUE
Status: COMPLETED | OUTPATIENT
Start: 2020-10-22 | End: 2020-10-22

## 2020-10-22 RX ORDER — PROMETHAZINE HYDROCHLORIDE AND DEXTROMETHORPHAN HYDROBROMIDE 6.25; 15 MG/5ML; MG/5ML
5 SYRUP ORAL
Qty: 118 ML | Refills: 0 | Status: SHIPPED | OUTPATIENT
Start: 2020-10-22 | End: 2021-01-07

## 2020-10-22 RX ADMIN — BETAMETHASONE SODIUM PHOSPHATE AND BETAMETHASONE ACETATE 6 MG: 3; 3 INJECTION, SUSPENSION INTRA-ARTICULAR; INTRALESIONAL; INTRAMUSCULAR; SOFT TISSUE at 09:10

## 2020-10-22 NOTE — PROGRESS NOTES
"Subjective:       Patient ID: Ivory Cade is a 45 y.o. female.    Vitals:  height is 5' 7" (1.702 m) and weight is 95.7 kg (211 lb). Her temperature is 97.2 °F (36.2 °C). Her blood pressure is 140/90 (abnormal) and her pulse is 73. Her respiration is 18 and oxygen saturation is 98%.     Chief Complaint: Cough    Pt presents to clinic today for evaluation of nonproductive cough worsening over the past X 7 days. Pt states she was tested for COVID-19 a few days ago, and test result was negative. She is tested weekly for COVID-19 at work. She is using tessalon perle's and albuterol inhaler as prescribed, however this does not seem to be improving symptoms.    Cough  This is a new problem. The current episode started in the past 7 days. The problem has been gradually worsening. The problem occurs constantly. The cough is non-productive. Associated symptoms include nasal congestion, postnasal drip and wheezing. Pertinent negatives include no chest pain, chills, ear congestion, ear pain, eye redness, fever, headaches, heartburn, hemoptysis, myalgias, rash, rhinorrhea, sore throat, shortness of breath, sweats or weight loss. The symptoms are aggravated by lying down. She has tried prescription cough suppressant for the symptoms. The treatment provided no relief. Her past medical history is significant for asthma and bronchitis.       Constitution: Negative for chills, sweating, fatigue and fever.   HENT: Positive for postnasal drip. Negative for ear pain, congestion, sinus pain, sinus pressure, sore throat and voice change.    Neck: Negative for painful lymph nodes.   Cardiovascular: Negative for chest pain.   Eyes: Negative for eye redness.   Respiratory: Positive for cough and wheezing. Negative for chest tightness, sputum production, bloody sputum, COPD, shortness of breath, stridor and asthma.    Gastrointestinal: Positive for nausea. Negative for vomiting and heartburn.   Musculoskeletal: Negative for muscle ache. "   Skin: Negative for rash.   Allergic/Immunologic: Negative for seasonal allergies and asthma.   Neurological: Negative for headaches.   Hematologic/Lymphatic: Negative for swollen lymph nodes.       Objective:      Physical Exam   Constitutional: She is oriented to person, place, and time. She appears well-developed. She is cooperative.  Non-toxic appearance. She does not appear ill. No distress.   HENT:   Head: Normocephalic and atraumatic.   Ears:   Right Ear: Hearing, tympanic membrane, external ear and ear canal normal.   Left Ear: Hearing, tympanic membrane, external ear and ear canal normal.   Nose: Mucosal edema present. No rhinorrhea, purulent discharge or nasal deformity. No epistaxis. Right sinus exhibits no maxillary sinus tenderness and no frontal sinus tenderness. Left sinus exhibits no maxillary sinus tenderness and no frontal sinus tenderness.   Mouth/Throat: Uvula is midline, oropharynx is clear and moist and mucous membranes are normal. No trismus in the jaw. Normal dentition. No uvula swelling. Cobblestoning present. No oropharyngeal exudate, posterior oropharyngeal edema or posterior oropharyngeal erythema. No tonsillar exudate.   Eyes: Conjunctivae and lids are normal. No scleral icterus.   Neck: Trachea normal, full passive range of motion without pain and phonation normal. Neck supple. No neck rigidity. No edema and no erythema present.   Cardiovascular: Normal rate, regular rhythm, normal heart sounds and normal pulses.   Pulmonary/Chest: Effort normal. No stridor. No respiratory distress. She has no decreased breath sounds. She has wheezes (fine expiratory) in the right upper field and the left upper field. She has no rhonchi. She has no rales.   NON PRODUCTIVE COUGH PRESENT THROUGHOUT ENTIRE EXAM.  Deep inspiration causes coughing. Pt having difficult time stopping the cough.      Comments: NON PRODUCTIVE COUGH PRESENT THROUGHOUT ENTIRE EXAM.  Deep inspiration causes coughing. Pt having  difficult time stopping the cough.      Abdominal: Normal appearance.   Musculoskeletal: Normal range of motion.         General: No deformity.   Neurological: She is alert and oriented to person, place, and time. She exhibits normal muscle tone. Coordination normal.   Skin: Skin is warm, dry, intact, not diaphoretic and not pale. Psychiatric: Her speech is normal and behavior is normal. Judgment and thought content normal.   Nursing note and vitals reviewed.        Assessment:       1. Bronchitis    2. Cough        Plan:         Bronchitis  -     betamethasone acetate-betamethasone sodium phosphate injection 6 mg  -     predniSONE (DELTASONE) 20 MG tablet; Take 1 tablet (20 mg total) by mouth once daily. for 5 days  Dispense: 5 tablet; Refill: 0    Cough  -     promethazine-dextromethorphan (PROMETHAZINE-DM) 6.25-15 mg/5 mL Syrp; Take 5 mLs by mouth every 4 to 6 hours as needed. 5 mL every 4 to 6 hours; maximum: 30 mL in 24 hours  Dispense: 118 mL; Refill: 0    Discussed diagnosis and treatment plan today given negative COVID-19 test result. Risks vs benefits of steroid use discussed with pt. Pt understands and would like to proceed with injection. She declines refill of inhaler. Records show she has multiple refills of Albuterol inhaler at pharmacy. All applicable EKG, medical records, labs, imaging reviewed in Epic and discussed with patient. Instructed to follow up with PCP or go to ER if symptoms fail to improve or worsen. Pt verbalizes understanding.       Patient Instructions     PLEASE READ YOUR DISCHARGE INSTRUCTIONS ENTIRELY AS IT CONTAINS IMPORTANT INFORMATION.    You received a steroid today - this can elevate your blood pressure, elevate your blood sugar, water weight gain, nervous energy, redness to the face and dimpling of the skin where the shot goes in if you had an injection.   Do not use steroids more than 3 times per year.   If you have diabetes, please check you blood sugar frequently.  If you  have high blood pressure, please check your blood pressure frequently.     Please take your steroids to completion. DO NOT BEGIN TAKING ORAL STEROIDS UNTIL TOMORROW.     Use the albuterol inhaler for wheezing.     Do not drive while taking the cough syrup - best to take it at night before going to sleep. However, you can take it during the day (every 4-6 hours) if you do not have to drive or operate machinery. This medication will make you drowsy. Try taking half a dose first to see how it affects you.      Please return or see your primary care doctor if you develop new or worsening symptoms.     Please arrange follow up with your primary medical clinic as soon as possible. You must understand that you've received an Urgent Care treatment only and that you may be released before all of your medical problems are known or treated. You, the patient, will arrange for follow up as instructed. If your symptoms worsen or fail to improve you should go to the Emergency Room.      Acute Bronchitis  Your healthcare provider has told you that you have acute bronchitis. Bronchitis is infection or inflammation of the bronchial tubes (airways in the lungs). Normally, air moves easily in and out of the airways. Bronchitis narrows the airways, making it harder for air to flow in and out of the lungs. This causes symptoms such as shortness of breath, coughing up yellow or green mucus, and wheezing. Bronchitis can be acute or chronic. Acute means the condition comes on quickly and goes away in a short time, usually within 3 to 10 days. Chronic means a condition lasts a long time and often comes back.    What causes acute bronchitis?  Acute bronchitis almost always starts as a viral respiratory infection, such as a cold or the flu. Certain factors make it more likely for a cold or flu to turn into bronchitis. These include being very young, being elderly, having a heart or lung problem, or having a weak immune system. Cigarette  smoking also makes bronchitis more likely.  When bronchitis develops, the airways become swollen. The airways may also become infected with bacteria. This is known as a secondary infection.  Diagnosing acute bronchitis  Your healthcare provider will examine you and ask about your symptoms and health history. You may also have a sputum culture to test the fluid in your lungs. Chest X-rays may be done to look for infection in the lungs.  Treating acute bronchitis  Bronchitis usually clears up as the cold or flu goes away. You can help feel better faster by doing the following:  · Take medicine as directed. You may be told to take ibuprofen or other over-the-counter medicines. These help relieve inflammation in your bronchial tubes. Your healthcare provider may prescribe an inhaler to help open up the bronchial tubes. Most of the time, acute bronchitis is caused by a viral infection. Antibiotics are usually not prescribed for viral infections.  · Drink plenty of fluids, such as water, juice, or warm soup. Fluids loosen mucus so that you can cough it up. This helps you breathe more easily. Fluids also prevent dehydration.  · Make sure you get plenty of rest.  · Do not smoke. Do not allow anyone else to smoke in your home.  Recovery and follow-up  Follow up with your doctor as you are told. You will likely feel better in a week or two. But a dry cough can linger beyond that time. Let your doctor know if you still have symptoms (other than a dry cough) after 2 weeks, or if youre prone to getting bronchial infections. Take steps to protect yourself from future infections. These steps include stopping smoking and avoiding tobacco smoke, washing your hands often, and getting a yearly flu shot.  When to call your healthcare provider  Call the healthcare provider if you have any of the following:  · Fever of 100.4°F (38.0°C) or higher, or as advised  · Symptoms that get worse, or new symptoms  · Trouble breathing  · Symptoms  that dont start to improve within a week, or within 3 days of taking antibiotics   Date Last Reviewed: 12/1/2016  © 0122-4662 [employer]. [address] [place] [address], [place] [address]. All rights reserved. This information is not intended as a substitute for professional medical care. Always follow your healthcare professional's instructions.

## 2020-10-22 NOTE — PATIENT INSTRUCTIONS
PLEASE READ YOUR DISCHARGE INSTRUCTIONS ENTIRELY AS IT CONTAINS IMPORTANT INFORMATION.    You received a steroid today - this can elevate your blood pressure, elevate your blood sugar, water weight gain, nervous energy, redness to the face and dimpling of the skin where the shot goes in if you had an injection.   Do not use steroids more than 3 times per year.   If you have diabetes, please check you blood sugar frequently.  If you have high blood pressure, please check your blood pressure frequently.     Please take your steroids to completion. DO NOT BEGIN TAKING ORAL STEROIDS UNTIL TOMORROW.     Use the albuterol inhaler for wheezing.     Do not drive while taking the cough syrup - best to take it at night before going to sleep. However, you can take it during the day (every 4-6 hours) if you do not have to drive or operate machinery. This medication will make you drowsy. Try taking half a dose first to see how it affects you.      Please return or see your primary care doctor if you develop new or worsening symptoms.     Please arrange follow up with your primary medical clinic as soon as possible. You must understand that you've received an Urgent Care treatment only and that you may be released before all of your medical problems are known or treated. You, the patient, will arrange for follow up as instructed. If your symptoms worsen or fail to improve you should go to the Emergency Room.      Acute Bronchitis  Your healthcare provider has told you that you have acute bronchitis. Bronchitis is infection or inflammation of the bronchial tubes (airways in the lungs). Normally, air moves easily in and out of the airways. Bronchitis narrows the airways, making it harder for air to flow in and out of the lungs. This causes symptoms such as shortness of breath, coughing up yellow or green mucus, and wheezing. Bronchitis can be acute or chronic. Acute means the condition comes on quickly and goes away in a short  time, usually within 3 to 10 days. Chronic means a condition lasts a long time and often comes back.    What causes acute bronchitis?  Acute bronchitis almost always starts as a viral respiratory infection, such as a cold or the flu. Certain factors make it more likely for a cold or flu to turn into bronchitis. These include being very young, being elderly, having a heart or lung problem, or having a weak immune system. Cigarette smoking also makes bronchitis more likely.  When bronchitis develops, the airways become swollen. The airways may also become infected with bacteria. This is known as a secondary infection.  Diagnosing acute bronchitis  Your healthcare provider will examine you and ask about your symptoms and health history. You may also have a sputum culture to test the fluid in your lungs. Chest X-rays may be done to look for infection in the lungs.  Treating acute bronchitis  Bronchitis usually clears up as the cold or flu goes away. You can help feel better faster by doing the following:  · Take medicine as directed. You may be told to take ibuprofen or other over-the-counter medicines. These help relieve inflammation in your bronchial tubes. Your healthcare provider may prescribe an inhaler to help open up the bronchial tubes. Most of the time, acute bronchitis is caused by a viral infection. Antibiotics are usually not prescribed for viral infections.  · Drink plenty of fluids, such as water, juice, or warm soup. Fluids loosen mucus so that you can cough it up. This helps you breathe more easily. Fluids also prevent dehydration.  · Make sure you get plenty of rest.  · Do not smoke. Do not allow anyone else to smoke in your home.  Recovery and follow-up  Follow up with your doctor as you are told. You will likely feel better in a week or two. But a dry cough can linger beyond that time. Let your doctor know if you still have symptoms (other than a dry cough) after 2 weeks, or if youre prone to getting  bronchial infections. Take steps to protect yourself from future infections. These steps include stopping smoking and avoiding tobacco smoke, washing your hands often, and getting a yearly flu shot.  When to call your healthcare provider  Call the healthcare provider if you have any of the following:  · Fever of 100.4°F (38.0°C) or higher, or as advised  · Symptoms that get worse, or new symptoms  · Trouble breathing  · Symptoms that dont start to improve within a week, or within 3 days of taking antibiotics   Date Last Reviewed: 12/1/2016  © 8332-7878 [employer]. [address] [place] [address]. All rights reserved. This information is not intended as a substitute for professional medical care. Always follow your healthcare professional's instructions.

## 2020-11-06 ENCOUNTER — PATIENT MESSAGE (OUTPATIENT)
Dept: FAMILY MEDICINE | Facility: CLINIC | Age: 45
End: 2020-11-06

## 2020-11-06 DIAGNOSIS — F33.41 RECURRENT MAJOR DEPRESSIVE DISORDER, IN PARTIAL REMISSION: Chronic | ICD-10-CM

## 2020-11-06 RX ORDER — BUPROPION HYDROCHLORIDE 300 MG/1
300 TABLET ORAL DAILY
Qty: 90 TABLET | Refills: 1 | Status: SHIPPED | OUTPATIENT
Start: 2020-11-06 | End: 2021-02-26

## 2020-11-11 ENCOUNTER — HOSPITAL ENCOUNTER (OUTPATIENT)
Dept: RADIOLOGY | Facility: HOSPITAL | Age: 45
Discharge: HOME | End: 2020-11-11
Attending: INTERNAL MEDICINE
Payer: COMMERCIAL

## 2020-11-11 DIAGNOSIS — Z12.31 SCREENING MAMMOGRAM, ENCOUNTER FOR: ICD-10-CM

## 2020-11-11 PROCEDURE — 77067 SCR MAMMO BI INCL CAD: CPT | Mod: 26,,, | Performed by: RADIOLOGY

## 2020-11-11 PROCEDURE — 77067 MAMMO DIGITAL SCREENING BILAT WITH TOMO: ICD-10-PCS | Mod: 26,,, | Performed by: RADIOLOGY

## 2020-11-11 PROCEDURE — 77067 SCR MAMMO BI INCL CAD: CPT | Mod: TC,PO

## 2020-11-11 PROCEDURE — 77063 MAMMO DIGITAL SCREENING BILAT WITH TOMO: ICD-10-PCS | Mod: 26,,, | Performed by: RADIOLOGY

## 2020-11-11 PROCEDURE — 77063 BREAST TOMOSYNTHESIS BI: CPT | Mod: 26,,, | Performed by: RADIOLOGY

## 2020-12-08 ENCOUNTER — PATIENT MESSAGE (OUTPATIENT)
Dept: FAMILY MEDICINE | Facility: CLINIC | Age: 45
End: 2020-12-08

## 2020-12-14 ENCOUNTER — PATIENT MESSAGE (OUTPATIENT)
Dept: FAMILY MEDICINE | Facility: CLINIC | Age: 45
End: 2020-12-14

## 2020-12-23 ENCOUNTER — TELEPHONE (OUTPATIENT)
Dept: FAMILY MEDICINE | Facility: CLINIC | Age: 45
End: 2020-12-23

## 2020-12-23 DIAGNOSIS — I70.209 POPLITEAL ARTERY STENOSIS: Primary | ICD-10-CM

## 2020-12-23 NOTE — TELEPHONE ENCOUNTER
Spoke with Dr. Aguirre about recent US results. Will need to see vascular surgery as soon as possible. BP also quite elevated at visit with him. She has apt scheduled with me in Jan, please contact her to set up apt for labs (ordered in October) prior to this apt, as well as vascular apt. If not able to be seen relatively quickly with vascular on [facility], see if she would be willing to go to main campus. Also see if she is able to keep home BP log prior to apt with me.

## 2020-12-28 DIAGNOSIS — G47.00 INSOMNIA, UNSPECIFIED TYPE: ICD-10-CM

## 2020-12-28 RX ORDER — HYDROXYZINE HYDROCHLORIDE 25 MG/1
25 TABLET, FILM COATED ORAL 3 TIMES DAILY PRN
Qty: 90 TABLET | Refills: 1 | Status: SHIPPED | OUTPATIENT
Start: 2020-12-28 | End: 2021-01-29

## 2020-12-29 ENCOUNTER — PATIENT MESSAGE (OUTPATIENT)
Dept: FAMILY MEDICINE | Facility: CLINIC | Age: 45
End: 2020-12-29

## 2020-12-29 ENCOUNTER — TELEPHONE (OUTPATIENT)
Dept: VASCULAR SURGERY | Facility: CLINIC | Age: 45
End: 2020-12-29

## 2020-12-29 NOTE — TELEPHONE ENCOUNTER
----- Message from Dayan Aguilar sent at 12/29/2020  3:47 PM CST -----  Type: Needs Medical Advice  Who Called:  Patient  Symptoms (please be specific):  Popliteal artery stenosis  Best Call Back Number:   Additional Information: Calling to schedule new patient appointment from referral.

## 2020-12-29 NOTE — TELEPHONE ENCOUNTER
Portal message sent to patient instructing her to contact clinic to schedule labs and appt with vascular.

## 2020-12-30 ENCOUNTER — LAB VISIT (OUTPATIENT)
Dept: LAB | Facility: HOSPITAL | Age: 45
End: 2020-12-30
Attending: INTERNAL MEDICINE
Payer: COMMERCIAL

## 2020-12-30 DIAGNOSIS — G47.00 INSOMNIA, UNSPECIFIED TYPE: ICD-10-CM

## 2020-12-30 DIAGNOSIS — F33.41 RECURRENT MAJOR DEPRESSIVE DISORDER, IN PARTIAL REMISSION: Chronic | ICD-10-CM

## 2020-12-30 DIAGNOSIS — Z00.00 PREVENTATIVE HEALTH CARE: ICD-10-CM

## 2020-12-30 DIAGNOSIS — E78.2 MIXED HYPERLIPIDEMIA: ICD-10-CM

## 2020-12-30 LAB
ALBUMIN SERPL BCP-MCNC: 3.6 G/DL (ref 3.5–5.2)
ALP SERPL-CCNC: 68 U/L (ref 55–135)
ALT SERPL W/O P-5'-P-CCNC: 13 U/L (ref 10–44)
ANION GAP SERPL CALC-SCNC: 11 MMOL/L (ref 8–16)
AST SERPL-CCNC: 13 U/L (ref 10–40)
BASOPHILS # BLD AUTO: 0.05 K/UL (ref 0–0.2)
BASOPHILS NFR BLD: 0.7 % (ref 0–1.9)
BILIRUB SERPL-MCNC: 0.7 MG/DL (ref 0.1–1)
BUN SERPL-MCNC: 11 MG/DL (ref 6–20)
CALCIUM SERPL-MCNC: 9.3 MG/DL (ref 8.7–10.5)
CHLORIDE SERPL-SCNC: 103 MMOL/L (ref 95–110)
CHOLEST SERPL-MCNC: 265 MG/DL (ref 120–199)
CHOLEST/HDLC SERPL: 4.9 {RATIO} (ref 2–5)
CO2 SERPL-SCNC: 24 MMOL/L (ref 23–29)
CREAT SERPL-MCNC: 0.9 MG/DL (ref 0.5–1.4)
DIFFERENTIAL METHOD: ABNORMAL
EOSINOPHIL # BLD AUTO: 0.7 K/UL (ref 0–0.5)
EOSINOPHIL NFR BLD: 9.6 % (ref 0–8)
ERYTHROCYTE [DISTWIDTH] IN BLOOD BY AUTOMATED COUNT: 12.2 % (ref 11.5–14.5)
EST. GFR  (AFRICAN AMERICAN): >60 ML/MIN/1.73 M^2
EST. GFR  (NON AFRICAN AMERICAN): >60 ML/MIN/1.73 M^2
GLUCOSE SERPL-MCNC: 96 MG/DL (ref 70–110)
HCT VFR BLD AUTO: 38 % (ref 37–48.5)
HDLC SERPL-MCNC: 54 MG/DL (ref 40–75)
HDLC SERPL: 20.4 % (ref 20–50)
HGB BLD-MCNC: 12.1 G/DL (ref 12–16)
IMM GRANULOCYTES # BLD AUTO: 0.05 K/UL (ref 0–0.04)
IMM GRANULOCYTES NFR BLD AUTO: 0.7 % (ref 0–0.5)
LDLC SERPL CALC-MCNC: 156.2 MG/DL (ref 63–159)
LYMPHOCYTES # BLD AUTO: 2 K/UL (ref 1–4.8)
LYMPHOCYTES NFR BLD: 27.9 % (ref 18–48)
MCH RBC QN AUTO: 31.9 PG (ref 27–31)
MCHC RBC AUTO-ENTMCNC: 31.8 G/DL (ref 32–36)
MCV RBC AUTO: 100 FL (ref 82–98)
MONOCYTES # BLD AUTO: 0.5 K/UL (ref 0.3–1)
MONOCYTES NFR BLD: 6.6 % (ref 4–15)
NEUTROPHILS # BLD AUTO: 4 K/UL (ref 1.8–7.7)
NEUTROPHILS NFR BLD: 54.5 % (ref 38–73)
NONHDLC SERPL-MCNC: 211 MG/DL
NRBC BLD-RTO: 0 /100 WBC
PLATELET # BLD AUTO: 167 K/UL (ref 150–350)
PMV BLD AUTO: 11 FL (ref 9.2–12.9)
POTASSIUM SERPL-SCNC: 3.9 MMOL/L (ref 3.5–5.1)
PROT SERPL-MCNC: 6.7 G/DL (ref 6–8.4)
RBC # BLD AUTO: 3.79 M/UL (ref 4–5.4)
SODIUM SERPL-SCNC: 138 MMOL/L (ref 136–145)
T4 FREE SERPL-MCNC: 0.89 NG/DL (ref 0.71–1.51)
TRIGL SERPL-MCNC: 274 MG/DL (ref 30–150)
TSH SERPL DL<=0.005 MIU/L-ACNC: 4.9 UIU/ML (ref 0.4–4)
WBC # BLD AUTO: 7.28 K/UL (ref 3.9–12.7)

## 2020-12-30 PROCEDURE — 80053 COMPREHEN METABOLIC PANEL: CPT

## 2020-12-30 PROCEDURE — 80061 LIPID PANEL: CPT

## 2020-12-30 PROCEDURE — 84439 ASSAY OF FREE THYROXINE: CPT

## 2020-12-30 PROCEDURE — 85025 COMPLETE CBC W/AUTO DIFF WBC: CPT

## 2020-12-30 PROCEDURE — 36415 COLL VENOUS BLD VENIPUNCTURE: CPT | Mod: PN

## 2020-12-30 PROCEDURE — 84443 ASSAY THYROID STIM HORMONE: CPT

## 2020-12-30 PROCEDURE — 86803 HEPATITIS C AB TEST: CPT

## 2020-12-31 LAB — HCV AB SERPL QL IA: NEGATIVE

## 2021-01-07 ENCOUNTER — OFFICE VISIT (OUTPATIENT)
Dept: FAMILY MEDICINE | Facility: CLINIC | Age: 46
End: 2021-01-07
Payer: COMMERCIAL

## 2021-01-07 VITALS
HEART RATE: 78 BPM | DIASTOLIC BLOOD PRESSURE: 86 MMHG | WEIGHT: 216.94 LBS | HEIGHT: 67 IN | BODY MASS INDEX: 34.05 KG/M2 | SYSTOLIC BLOOD PRESSURE: 136 MMHG

## 2021-01-07 DIAGNOSIS — F41.9 ANXIETY: ICD-10-CM

## 2021-01-07 DIAGNOSIS — E78.2 MIXED HYPERLIPIDEMIA: ICD-10-CM

## 2021-01-07 DIAGNOSIS — I10 ESSENTIAL HYPERTENSION: Primary | ICD-10-CM

## 2021-01-07 DIAGNOSIS — R06.83 SNORING: ICD-10-CM

## 2021-01-07 DIAGNOSIS — G47.19 EXCESSIVE DAYTIME SLEEPINESS: ICD-10-CM

## 2021-01-07 DIAGNOSIS — E03.9 HYPOTHYROIDISM, UNSPECIFIED TYPE: ICD-10-CM

## 2021-01-07 PROCEDURE — 3075F PR MOST RECENT SYSTOLIC BLOOD PRESS GE 130-139MM HG: ICD-10-PCS | Mod: CPTII,S$GLB,, | Performed by: INTERNAL MEDICINE

## 2021-01-07 PROCEDURE — 3079F DIAST BP 80-89 MM HG: CPT | Mod: CPTII,S$GLB,, | Performed by: INTERNAL MEDICINE

## 2021-01-07 PROCEDURE — 3008F BODY MASS INDEX DOCD: CPT | Mod: CPTII,S$GLB,, | Performed by: INTERNAL MEDICINE

## 2021-01-07 PROCEDURE — 3079F PR MOST RECENT DIASTOLIC BLOOD PRESSURE 80-89 MM HG: ICD-10-PCS | Mod: CPTII,S$GLB,, | Performed by: INTERNAL MEDICINE

## 2021-01-07 PROCEDURE — 99214 OFFICE O/P EST MOD 30 MIN: CPT | Mod: S$GLB,,, | Performed by: INTERNAL MEDICINE

## 2021-01-07 PROCEDURE — 3008F PR BODY MASS INDEX (BMI) DOCUMENTED: ICD-10-PCS | Mod: CPTII,S$GLB,, | Performed by: INTERNAL MEDICINE

## 2021-01-07 PROCEDURE — 99214 PR OFFICE/OUTPT VISIT, EST, LEVL IV, 30-39 MIN: ICD-10-PCS | Mod: S$GLB,,, | Performed by: INTERNAL MEDICINE

## 2021-01-07 PROCEDURE — 99999 PR PBB SHADOW E&M-EST. PATIENT-LVL III: ICD-10-PCS | Mod: PBBFAC,,, | Performed by: INTERNAL MEDICINE

## 2021-01-07 PROCEDURE — 1126F AMNT PAIN NOTED NONE PRSNT: CPT | Mod: S$GLB,,, | Performed by: INTERNAL MEDICINE

## 2021-01-07 PROCEDURE — 1126F PR PAIN SEVERITY QUANTIFIED, NO PAIN PRESENT: ICD-10-PCS | Mod: S$GLB,,, | Performed by: INTERNAL MEDICINE

## 2021-01-07 PROCEDURE — 3075F SYST BP GE 130 - 139MM HG: CPT | Mod: CPTII,S$GLB,, | Performed by: INTERNAL MEDICINE

## 2021-01-07 PROCEDURE — 99999 PR PBB SHADOW E&M-EST. PATIENT-LVL III: CPT | Mod: PBBFAC,,, | Performed by: INTERNAL MEDICINE

## 2021-01-07 RX ORDER — HYDROCHLOROTHIAZIDE 25 MG/1
25 TABLET ORAL DAILY
Qty: 90 TABLET | Refills: 1 | Status: SHIPPED | OUTPATIENT
Start: 2021-01-07 | End: 2021-07-19

## 2021-01-07 RX ORDER — CITALOPRAM 20 MG/1
20 TABLET, FILM COATED ORAL DAILY
Qty: 90 TABLET | Refills: 1 | Status: SHIPPED | OUTPATIENT
Start: 2021-01-07 | End: 2022-08-24

## 2021-01-07 RX ORDER — LEVOTHYROXINE SODIUM 50 UG/1
50 TABLET ORAL
Qty: 90 TABLET | Refills: 0 | Status: SHIPPED | OUTPATIENT
Start: 2021-01-07 | End: 2021-04-13 | Stop reason: SDUPTHER

## 2021-01-07 RX ORDER — ATORVASTATIN CALCIUM 20 MG/1
20 TABLET, FILM COATED ORAL DAILY
Qty: 90 TABLET | Refills: 3 | Status: SHIPPED | OUTPATIENT
Start: 2021-01-07 | End: 2022-03-01

## 2021-01-14 ENCOUNTER — OFFICE VISIT (OUTPATIENT)
Dept: VASCULAR SURGERY | Facility: CLINIC | Age: 46
End: 2021-01-14
Payer: COMMERCIAL

## 2021-01-14 VITALS
WEIGHT: 217.13 LBS | HEART RATE: 74 BPM | HEIGHT: 67 IN | SYSTOLIC BLOOD PRESSURE: 148 MMHG | BODY MASS INDEX: 34.08 KG/M2 | DIASTOLIC BLOOD PRESSURE: 90 MMHG

## 2021-01-14 DIAGNOSIS — I73.9 PAD (PERIPHERAL ARTERY DISEASE): Primary | ICD-10-CM

## 2021-01-14 PROCEDURE — 3008F PR BODY MASS INDEX (BMI) DOCUMENTED: ICD-10-PCS | Mod: CPTII,S$GLB,, | Performed by: THORACIC SURGERY (CARDIOTHORACIC VASCULAR SURGERY)

## 2021-01-14 PROCEDURE — 3008F BODY MASS INDEX DOCD: CPT | Mod: CPTII,S$GLB,, | Performed by: THORACIC SURGERY (CARDIOTHORACIC VASCULAR SURGERY)

## 2021-01-14 PROCEDURE — 99999 PR PBB SHADOW E&M-EST. PATIENT-LVL III: ICD-10-PCS | Mod: PBBFAC,,, | Performed by: THORACIC SURGERY (CARDIOTHORACIC VASCULAR SURGERY)

## 2021-01-14 PROCEDURE — 99203 OFFICE O/P NEW LOW 30 MIN: CPT | Mod: S$GLB,,, | Performed by: THORACIC SURGERY (CARDIOTHORACIC VASCULAR SURGERY)

## 2021-01-14 PROCEDURE — 99203 PR OFFICE/OUTPT VISIT, NEW, LEVL III, 30-44 MIN: ICD-10-PCS | Mod: S$GLB,,, | Performed by: THORACIC SURGERY (CARDIOTHORACIC VASCULAR SURGERY)

## 2021-01-14 PROCEDURE — 99999 PR PBB SHADOW E&M-EST. PATIENT-LVL III: CPT | Mod: PBBFAC,,, | Performed by: THORACIC SURGERY (CARDIOTHORACIC VASCULAR SURGERY)

## 2021-01-14 PROCEDURE — 3080F DIAST BP >= 90 MM HG: CPT | Mod: CPTII,S$GLB,, | Performed by: THORACIC SURGERY (CARDIOTHORACIC VASCULAR SURGERY)

## 2021-01-14 PROCEDURE — 3077F PR MOST RECENT SYSTOLIC BLOOD PRESSURE >= 140 MM HG: ICD-10-PCS | Mod: CPTII,S$GLB,, | Performed by: THORACIC SURGERY (CARDIOTHORACIC VASCULAR SURGERY)

## 2021-01-14 PROCEDURE — 1126F PR PAIN SEVERITY QUANTIFIED, NO PAIN PRESENT: ICD-10-PCS | Mod: S$GLB,,, | Performed by: THORACIC SURGERY (CARDIOTHORACIC VASCULAR SURGERY)

## 2021-01-14 PROCEDURE — 3080F PR MOST RECENT DIASTOLIC BLOOD PRESSURE >= 90 MM HG: ICD-10-PCS | Mod: CPTII,S$GLB,, | Performed by: THORACIC SURGERY (CARDIOTHORACIC VASCULAR SURGERY)

## 2021-01-14 PROCEDURE — 1126F AMNT PAIN NOTED NONE PRSNT: CPT | Mod: S$GLB,,, | Performed by: THORACIC SURGERY (CARDIOTHORACIC VASCULAR SURGERY)

## 2021-01-14 PROCEDURE — 3077F SYST BP >= 140 MM HG: CPT | Mod: CPTII,S$GLB,, | Performed by: THORACIC SURGERY (CARDIOTHORACIC VASCULAR SURGERY)

## 2021-03-30 DIAGNOSIS — E03.9 HYPOTHYROIDISM, UNSPECIFIED TYPE: ICD-10-CM

## 2021-03-31 ENCOUNTER — PATIENT MESSAGE (OUTPATIENT)
Dept: FAMILY MEDICINE | Facility: CLINIC | Age: 46
End: 2021-03-31

## 2021-04-01 ENCOUNTER — PATIENT MESSAGE (OUTPATIENT)
Dept: FAMILY MEDICINE | Facility: CLINIC | Age: 46
End: 2021-04-01

## 2021-04-01 ENCOUNTER — TELEPHONE (OUTPATIENT)
Dept: FAMILY MEDICINE | Facility: CLINIC | Age: 46
End: 2021-04-01

## 2021-04-06 RX ORDER — LEVOTHYROXINE SODIUM 50 UG/1
50 TABLET ORAL
Qty: 90 TABLET | Refills: 0 | OUTPATIENT
Start: 2021-04-06 | End: 2022-04-06

## 2021-04-08 ENCOUNTER — LAB VISIT (OUTPATIENT)
Dept: LAB | Facility: HOSPITAL | Age: 46
End: 2021-04-08
Attending: INTERNAL MEDICINE
Payer: COMMERCIAL

## 2021-04-08 DIAGNOSIS — E03.9 HYPOTHYROIDISM, UNSPECIFIED TYPE: ICD-10-CM

## 2021-04-08 PROCEDURE — 84443 ASSAY THYROID STIM HORMONE: CPT | Performed by: INTERNAL MEDICINE

## 2021-04-08 PROCEDURE — 36415 COLL VENOUS BLD VENIPUNCTURE: CPT | Mod: PN | Performed by: INTERNAL MEDICINE

## 2021-04-09 LAB — TSH SERPL DL<=0.005 MIU/L-ACNC: 0.93 UIU/ML (ref 0.4–4)

## 2021-04-13 ENCOUNTER — LAB VISIT (OUTPATIENT)
Dept: LAB | Facility: HOSPITAL | Age: 46
End: 2021-04-13
Attending: INTERNAL MEDICINE
Payer: COMMERCIAL

## 2021-04-13 ENCOUNTER — OFFICE VISIT (OUTPATIENT)
Dept: FAMILY MEDICINE | Facility: CLINIC | Age: 46
End: 2021-04-13
Payer: COMMERCIAL

## 2021-04-13 VITALS
WEIGHT: 217.5 LBS | TEMPERATURE: 98 F | BODY MASS INDEX: 34.14 KG/M2 | HEART RATE: 77 BPM | SYSTOLIC BLOOD PRESSURE: 124 MMHG | HEIGHT: 67 IN | DIASTOLIC BLOOD PRESSURE: 80 MMHG

## 2021-04-13 DIAGNOSIS — R19.7 DIARRHEA, UNSPECIFIED TYPE: ICD-10-CM

## 2021-04-13 DIAGNOSIS — R53.82 CHRONIC FATIGUE: ICD-10-CM

## 2021-04-13 DIAGNOSIS — K62.5 RECTAL BLEEDING: ICD-10-CM

## 2021-04-13 DIAGNOSIS — K62.5 RECTAL BLEEDING: Primary | ICD-10-CM

## 2021-04-13 DIAGNOSIS — E03.9 HYPOTHYROIDISM, UNSPECIFIED TYPE: ICD-10-CM

## 2021-04-13 LAB
25(OH)D3+25(OH)D2 SERPL-MCNC: 18 NG/ML (ref 30–96)
BASOPHILS # BLD AUTO: 0.04 K/UL (ref 0–0.2)
BASOPHILS NFR BLD: 0.7 % (ref 0–1.9)
DIFFERENTIAL METHOD: ABNORMAL
EOSINOPHIL # BLD AUTO: 0.6 K/UL (ref 0–0.5)
EOSINOPHIL NFR BLD: 9.5 % (ref 0–8)
ERYTHROCYTE [DISTWIDTH] IN BLOOD BY AUTOMATED COUNT: 12.4 % (ref 11.5–14.5)
HCT VFR BLD AUTO: 37.4 % (ref 37–48.5)
HGB BLD-MCNC: 12.2 G/DL (ref 12–16)
IMM GRANULOCYTES # BLD AUTO: 0.04 K/UL (ref 0–0.04)
IMM GRANULOCYTES NFR BLD AUTO: 0.7 % (ref 0–0.5)
LYMPHOCYTES # BLD AUTO: 1.8 K/UL (ref 1–4.8)
LYMPHOCYTES NFR BLD: 31.2 % (ref 18–48)
MCH RBC QN AUTO: 31.9 PG (ref 27–31)
MCHC RBC AUTO-ENTMCNC: 32.6 G/DL (ref 32–36)
MCV RBC AUTO: 98 FL (ref 82–98)
MONOCYTES # BLD AUTO: 0.5 K/UL (ref 0.3–1)
MONOCYTES NFR BLD: 7.8 % (ref 4–15)
NEUTROPHILS # BLD AUTO: 2.9 K/UL (ref 1.8–7.7)
NEUTROPHILS NFR BLD: 50.1 % (ref 38–73)
NRBC BLD-RTO: 0 /100 WBC
PLATELET # BLD AUTO: 172 K/UL (ref 150–450)
PMV BLD AUTO: 11.3 FL (ref 9.2–12.9)
RBC # BLD AUTO: 3.82 M/UL (ref 4–5.4)
VIT B12 SERPL-MCNC: 594 PG/ML (ref 210–950)
WBC # BLD AUTO: 5.87 K/UL (ref 3.9–12.7)

## 2021-04-13 PROCEDURE — 99214 PR OFFICE/OUTPT VISIT, EST, LEVL IV, 30-39 MIN: ICD-10-PCS | Mod: S$GLB,,, | Performed by: INTERNAL MEDICINE

## 2021-04-13 PROCEDURE — 85025 COMPLETE CBC W/AUTO DIFF WBC: CPT | Performed by: INTERNAL MEDICINE

## 2021-04-13 PROCEDURE — 99999 PR PBB SHADOW E&M-EST. PATIENT-LVL IV: ICD-10-PCS | Mod: PBBFAC,,, | Performed by: INTERNAL MEDICINE

## 2021-04-13 PROCEDURE — 3008F BODY MASS INDEX DOCD: CPT | Mod: CPTII,S$GLB,, | Performed by: INTERNAL MEDICINE

## 2021-04-13 PROCEDURE — 1126F PR PAIN SEVERITY QUANTIFIED, NO PAIN PRESENT: ICD-10-PCS | Mod: S$GLB,,, | Performed by: INTERNAL MEDICINE

## 2021-04-13 PROCEDURE — 99214 OFFICE O/P EST MOD 30 MIN: CPT | Mod: S$GLB,,, | Performed by: INTERNAL MEDICINE

## 2021-04-13 PROCEDURE — 82607 VITAMIN B-12: CPT | Performed by: INTERNAL MEDICINE

## 2021-04-13 PROCEDURE — 1126F AMNT PAIN NOTED NONE PRSNT: CPT | Mod: S$GLB,,, | Performed by: INTERNAL MEDICINE

## 2021-04-13 PROCEDURE — 36415 COLL VENOUS BLD VENIPUNCTURE: CPT | Mod: PN | Performed by: INTERNAL MEDICINE

## 2021-04-13 PROCEDURE — 3008F PR BODY MASS INDEX (BMI) DOCUMENTED: ICD-10-PCS | Mod: CPTII,S$GLB,, | Performed by: INTERNAL MEDICINE

## 2021-04-13 PROCEDURE — 99999 PR PBB SHADOW E&M-EST. PATIENT-LVL IV: CPT | Mod: PBBFAC,,, | Performed by: INTERNAL MEDICINE

## 2021-04-13 PROCEDURE — 82306 VITAMIN D 25 HYDROXY: CPT | Performed by: INTERNAL MEDICINE

## 2021-04-13 RX ORDER — LEVOTHYROXINE SODIUM 50 UG/1
50 TABLET ORAL
Qty: 90 TABLET | Refills: 0 | Status: SHIPPED | OUTPATIENT
Start: 2021-04-13 | End: 2021-10-22

## 2021-10-21 ENCOUNTER — OFFICE VISIT (OUTPATIENT)
Dept: GASTROENTEROLOGY | Facility: CLINIC | Age: 46
End: 2021-10-21
Payer: COMMERCIAL

## 2021-10-21 VITALS — BODY MASS INDEX: 34.71 KG/M2 | WEIGHT: 221.13 LBS | HEIGHT: 67 IN

## 2021-10-21 DIAGNOSIS — Z80.0 FAMILY HISTORY OF COLON CANCER: ICD-10-CM

## 2021-10-21 DIAGNOSIS — Z87.19 HISTORY OF HEMORRHOIDS: ICD-10-CM

## 2021-10-21 DIAGNOSIS — K62.5 RECTAL BLEEDING: Primary | ICD-10-CM

## 2021-10-21 DIAGNOSIS — Z87.19 HISTORY OF IBS: ICD-10-CM

## 2021-10-21 DIAGNOSIS — R19.7 INTERMITTENT DIARRHEA: ICD-10-CM

## 2021-10-21 PROCEDURE — 99999 PR PBB SHADOW E&M-EST. PATIENT-LVL III: ICD-10-PCS | Mod: PBBFAC,,, | Performed by: NURSE PRACTITIONER

## 2021-10-21 PROCEDURE — 99204 PR OFFICE/OUTPT VISIT, NEW, LEVL IV, 45-59 MIN: ICD-10-PCS | Mod: S$GLB,,, | Performed by: NURSE PRACTITIONER

## 2021-10-21 PROCEDURE — 99204 OFFICE O/P NEW MOD 45 MIN: CPT | Mod: S$GLB,,, | Performed by: NURSE PRACTITIONER

## 2021-10-21 PROCEDURE — 99999 PR PBB SHADOW E&M-EST. PATIENT-LVL III: CPT | Mod: PBBFAC,,, | Performed by: NURSE PRACTITIONER

## 2021-10-21 RX ORDER — HYDROCORTISONE ACETATE 25 MG/1
25 SUPPOSITORY RECTAL 2 TIMES DAILY
Qty: 20 SUPPOSITORY | Refills: 0 | Status: ON HOLD | OUTPATIENT
Start: 2021-10-21 | End: 2021-10-28 | Stop reason: CLARIF

## 2021-10-28 ENCOUNTER — HOSPITAL ENCOUNTER (OUTPATIENT)
Facility: HOSPITAL | Age: 46
Discharge: HOME | End: 2021-10-28
Attending: INTERNAL MEDICINE | Admitting: INTERNAL MEDICINE
Payer: COMMERCIAL

## 2021-10-28 ENCOUNTER — ANESTHESIA EVENT (OUTPATIENT)
Dept: ENDOSCOPY | Facility: HOSPITAL | Age: 46
End: 2021-10-28
Payer: COMMERCIAL

## 2021-10-28 ENCOUNTER — ANESTHESIA (OUTPATIENT)
Dept: ENDOSCOPY | Facility: HOSPITAL | Age: 46
End: 2021-10-28
Payer: COMMERCIAL

## 2021-10-28 DIAGNOSIS — K62.5 RECTAL BLEEDING: ICD-10-CM

## 2021-10-28 PROCEDURE — 25000003 PHARM REV CODE 250: Mod: PO | Performed by: NURSE ANESTHETIST, CERTIFIED REGISTERED

## 2021-10-28 PROCEDURE — 45378 DIAGNOSTIC COLONOSCOPY: CPT | Mod: PO | Performed by: INTERNAL MEDICINE

## 2021-10-28 PROCEDURE — 63600175 PHARM REV CODE 636 W HCPCS: Mod: PO | Performed by: NURSE ANESTHETIST, CERTIFIED REGISTERED

## 2021-10-28 PROCEDURE — G0105 COLORECTAL SCRN; HI RISK IND: HCPCS | Mod: PO | Performed by: INTERNAL MEDICINE

## 2021-10-28 PROCEDURE — D9220A PRA ANESTHESIA: ICD-10-PCS | Mod: CRNA,,, | Performed by: NURSE ANESTHETIST, CERTIFIED REGISTERED

## 2021-10-28 PROCEDURE — 45378 DIAGNOSTIC COLONOSCOPY: CPT | Mod: ,,, | Performed by: INTERNAL MEDICINE

## 2021-10-28 PROCEDURE — 37000009 HC ANESTHESIA EA ADD 15 MINS: Mod: PO | Performed by: INTERNAL MEDICINE

## 2021-10-28 PROCEDURE — 45378 PR COLONOSCOPY,DIAGNOSTIC: ICD-10-PCS | Mod: ,,, | Performed by: INTERNAL MEDICINE

## 2021-10-28 PROCEDURE — D9220A PRA ANESTHESIA: Mod: ANES,,, | Performed by: ANESTHESIOLOGY

## 2021-10-28 PROCEDURE — D9220A PRA ANESTHESIA: Mod: CRNA,,, | Performed by: NURSE ANESTHETIST, CERTIFIED REGISTERED

## 2021-10-28 PROCEDURE — D9220A PRA ANESTHESIA: ICD-10-PCS | Mod: ANES,,, | Performed by: ANESTHESIOLOGY

## 2021-10-28 PROCEDURE — 37000008 HC ANESTHESIA 1ST 15 MINUTES: Mod: PO | Performed by: INTERNAL MEDICINE

## 2021-10-28 PROCEDURE — 63600175 PHARM REV CODE 636 W HCPCS: Mod: PO | Performed by: INTERNAL MEDICINE

## 2021-10-28 RX ORDER — SODIUM CHLORIDE 0.9 % (FLUSH) 0.9 %
10 SYRINGE (ML) INJECTION
Status: DISCONTINUED | OUTPATIENT
Start: 2021-10-28 | End: 2021-10-28 | Stop reason: HOSPADM

## 2021-10-28 RX ORDER — PROPOFOL 10 MG/ML
VIAL (ML) INTRAVENOUS
Status: DISCONTINUED | OUTPATIENT
Start: 2021-10-28 | End: 2021-10-28

## 2021-10-28 RX ORDER — SODIUM CHLORIDE, SODIUM LACTATE, POTASSIUM CHLORIDE, CALCIUM CHLORIDE 600; 310; 30; 20 MG/100ML; MG/100ML; MG/100ML; MG/100ML
INJECTION, SOLUTION INTRAVENOUS CONTINUOUS
Status: DISCONTINUED | OUTPATIENT
Start: 2021-10-28 | End: 2021-10-28 | Stop reason: HOSPADM

## 2021-10-28 RX ORDER — LIDOCAINE HCL/PF 100 MG/5ML
SYRINGE (ML) INTRAVENOUS
Status: DISCONTINUED | OUTPATIENT
Start: 2021-10-28 | End: 2021-10-28

## 2021-10-28 RX ORDER — FENTANYL CITRATE 50 UG/ML
INJECTION, SOLUTION INTRAMUSCULAR; INTRAVENOUS
Status: DISCONTINUED | OUTPATIENT
Start: 2021-10-28 | End: 2021-10-28

## 2021-10-28 RX ORDER — MIDAZOLAM HYDROCHLORIDE 1 MG/ML
INJECTION, SOLUTION INTRAMUSCULAR; INTRAVENOUS
Status: DISCONTINUED | OUTPATIENT
Start: 2021-10-28 | End: 2021-10-28

## 2021-10-28 RX ORDER — PROPOFOL 10 MG/ML
VIAL (ML) INTRAVENOUS CONTINUOUS PRN
Status: DISCONTINUED | OUTPATIENT
Start: 2021-10-28 | End: 2021-10-28

## 2021-10-28 RX ADMIN — PROPOFOL 150 MCG/KG/MIN: 10 INJECTION, EMULSION INTRAVENOUS at 10:10

## 2021-10-28 RX ADMIN — PROPOFOL 50 MG: 10 INJECTION, EMULSION INTRAVENOUS at 10:10

## 2021-10-28 RX ADMIN — FENTANYL CITRATE 50 MCG: 50 INJECTION, SOLUTION INTRAMUSCULAR; INTRAVENOUS at 10:10

## 2021-10-28 RX ADMIN — LIDOCAINE HYDROCHLORIDE 100 MG: 20 INJECTION, SOLUTION INTRAVENOUS at 10:10

## 2021-10-28 RX ADMIN — MIDAZOLAM HYDROCHLORIDE 2 MG: 1 INJECTION, SOLUTION INTRAMUSCULAR; INTRAVENOUS at 10:10

## 2021-10-28 RX ADMIN — SODIUM CHLORIDE, SODIUM LACTATE, POTASSIUM CHLORIDE, AND CALCIUM CHLORIDE: .6; .31; .03; .02 INJECTION, SOLUTION INTRAVENOUS at 10:10

## 2021-10-28 RX ADMIN — PROPOFOL 100 MG: 10 INJECTION, EMULSION INTRAVENOUS at 10:10

## 2021-10-29 VITALS
BODY MASS INDEX: 34.53 KG/M2 | OXYGEN SATURATION: 97 % | TEMPERATURE: 98 F | RESPIRATION RATE: 18 BRPM | SYSTOLIC BLOOD PRESSURE: 112 MMHG | DIASTOLIC BLOOD PRESSURE: 62 MMHG | HEIGHT: 67 IN | WEIGHT: 220 LBS | HEART RATE: 73 BPM

## 2021-11-08 ENCOUNTER — PATIENT MESSAGE (OUTPATIENT)
Dept: FAMILY MEDICINE | Facility: CLINIC | Age: 46
End: 2021-11-08
Payer: COMMERCIAL

## 2021-11-22 ENCOUNTER — OFFICE VISIT (OUTPATIENT)
Dept: URGENT CARE | Facility: CLINIC | Age: 46
End: 2021-11-22
Payer: COMMERCIAL

## 2021-11-22 VITALS
DIASTOLIC BLOOD PRESSURE: 89 MMHG | WEIGHT: 222 LBS | TEMPERATURE: 98 F | OXYGEN SATURATION: 97 % | RESPIRATION RATE: 16 BRPM | SYSTOLIC BLOOD PRESSURE: 138 MMHG | HEART RATE: 80 BPM | HEIGHT: 67 IN | BODY MASS INDEX: 34.84 KG/M2

## 2021-11-22 DIAGNOSIS — M25.562 ACUTE PAIN OF LEFT KNEE: Primary | ICD-10-CM

## 2021-11-22 PROCEDURE — 99212 OFFICE O/P EST SF 10 MIN: CPT | Mod: S$GLB,,, | Performed by: PHYSICIAN ASSISTANT

## 2021-11-22 PROCEDURE — 99212 PR OFFICE/OUTPT VISIT, EST, LEVL II, 10-19 MIN: ICD-10-PCS | Mod: S$GLB,,, | Performed by: PHYSICIAN ASSISTANT

## 2021-12-02 DIAGNOSIS — Z12.31 OTHER SCREENING MAMMOGRAM: ICD-10-CM

## 2022-01-07 ENCOUNTER — OFFICE VISIT (OUTPATIENT)
Dept: PODIATRY | Facility: CLINIC | Age: 47
End: 2022-01-07
Payer: COMMERCIAL

## 2022-01-07 VITALS — RESPIRATION RATE: 20 BRPM | HEIGHT: 67 IN | BODY MASS INDEX: 34.77 KG/M2

## 2022-01-07 DIAGNOSIS — G57.53 TARSAL TUNNEL SYNDROME OF BOTH LOWER EXTREMITIES: Primary | ICD-10-CM

## 2022-01-07 DIAGNOSIS — Q66.6 PES VALGUS: ICD-10-CM

## 2022-01-07 PROCEDURE — 99203 OFFICE O/P NEW LOW 30 MIN: CPT | Mod: S$GLB,,, | Performed by: PODIATRIST

## 2022-01-07 PROCEDURE — 99203 PR OFFICE/OUTPT VISIT, NEW, LEVL III, 30-44 MIN: ICD-10-PCS | Mod: S$GLB,,, | Performed by: PODIATRIST

## 2022-01-07 PROCEDURE — 99999 PR PBB SHADOW E&M-EST. PATIENT-LVL III: CPT | Mod: PBBFAC,,, | Performed by: PODIATRIST

## 2022-01-07 PROCEDURE — 99999 PR PBB SHADOW E&M-EST. PATIENT-LVL III: ICD-10-PCS | Mod: PBBFAC,,, | Performed by: PODIATRIST

## 2022-01-07 RX ORDER — METHYLPREDNISOLONE 4 MG/1
TABLET ORAL
Qty: 21 EACH | Refills: 0 | Status: SHIPPED | OUTPATIENT
Start: 2022-01-07 | End: 2022-01-28

## 2022-01-07 NOTE — PROGRESS NOTES
Subjective:      Patient ID: Ivory Cade is a 46 y.o. female.    Chief Complaint: Foot Pain (B/L foot pain (bottoms of foot) ) and Numbness (B/L foot)    Ivory is a 46 y.o. female who presents to the podiatry clinic  with complaint of  bilateral foot pain. Onset of the symptoms was several years ago. Precipitating event: none known. Current symptoms include: ability to bear weight, but with some pain and burning and tingling bottom of the feet mostly in the ball of the feet. Aggravating factors: any weight bearing and but can happen even when at rest. Symptoms have gradually worsened. Patient has had prior foot problems. Evaluation to date: none. Treatment to date: none. Patients rates pain 2/10 on pain scale.      Review of Systems   Constitutional: Negative for chills and fever.   Cardiovascular: Negative for claudication and leg swelling.   Respiratory: Negative for shortness of breath.    Skin: Positive for nail changes. Negative for itching and rash.   Musculoskeletal: Negative for muscle cramps, muscle weakness and myalgias.   Gastrointestinal: Negative for nausea and vomiting.   Neurological: Negative for focal weakness, loss of balance, numbness and paresthesias.           Objective:      Physical Exam  Constitutional:       General: She is not in acute distress.     Appearance: She is well-developed and well-nourished. She is not diaphoretic.   Cardiovascular:      Pulses:           Dorsalis pedis pulses are 2+ on the right side and 2+ on the left side.        Posterior tibial pulses are 2+ on the right side and 2+ on the left side.      Comments: < 3 sec capillary refill time to toes 1-5 bilateral. Toes and feet are warm to touch proximally with normal distal cooling b/l. There is some hair growth on the feet and toes b/l. There is no edema b/l. No spider veins or varicosities present b/l.     Musculoskeletal:      Comments: Equinus noted b/l ankles with < 10 deg DF noted. MMT 5/5 in DF/PF/Inv/Ev  resistance with no reproduction of pain in any direction. Passive range of motion of ankle and pedal joints is painless b/l.    Some tenderness to the tarsal tunnel especially over the abductor muscle belly bilateral with paresthesias    Medial arch collapse bilateral with weight bearing    No tenderness to direct palpation plantar metatarsals bilateral   Skin:     General: Skin is warm, dry and intact.      Coloration: Skin is not pale.      Findings: No abrasion, bruising, burn, ecchymosis, erythema, laceration, lesion, petechiae or rash.      Nails: There is no clubbing or cyanosis.      Comments: Skin temperature, texture and turgor within normal limits.   Neurological:      Mental Status: She is alert and oriented to person, place, and time.      Sensory: No sensory deficit.      Motor: No tremor, atrophy or abnormal muscle tone.      Deep Tendon Reflexes: Strength normal.      Comments: Positive tinel sign bilateral.   Psychiatric:         Mood and Affect: Mood and affect normal.         Behavior: Behavior normal.               Assessment:       Encounter Diagnoses   Name Primary?    Tarsal tunnel syndrome of both lower extremities Yes    Pes valgus          Plan:       Ivory was seen today for foot pain and numbness.    Diagnoses and all orders for this visit:    Tarsal tunnel syndrome of both lower extremities  -     ORTHOTIC DEVICE (DME)    Pes valgus  -     ORTHOTIC DEVICE (DME)    Other orders  -     methylPREDNISolone (MEDROL DOSEPACK) 4 mg tablet; use as directed      I counseled the patient on her conditions, their implications and medical management.    Medrol dose pack for inflammation    Custom orthotic prescription dispensed with a list of places she can go to get them    Avoid barefoot or flats    Consider diagnostic injections, steroid injections, PT and EMG if pain persists    Hugo Escobar DPM

## 2022-01-17 ENCOUNTER — PATIENT MESSAGE (OUTPATIENT)
Dept: FAMILY MEDICINE | Facility: CLINIC | Age: 47
End: 2022-01-17
Payer: COMMERCIAL

## 2022-02-14 ENCOUNTER — PATIENT MESSAGE (OUTPATIENT)
Dept: FAMILY MEDICINE | Facility: CLINIC | Age: 47
End: 2022-02-14
Payer: COMMERCIAL

## 2022-02-22 ENCOUNTER — PATIENT MESSAGE (OUTPATIENT)
Dept: FAMILY MEDICINE | Facility: CLINIC | Age: 47
End: 2022-02-22
Payer: COMMERCIAL

## 2022-02-26 DIAGNOSIS — E78.2 MIXED HYPERLIPIDEMIA: ICD-10-CM

## 2022-02-26 NOTE — TELEPHONE ENCOUNTER
No new care gaps identified.  Powered by [employer]. Reference number: [id number].   2/26/2022 7:28:35 AM CST

## 2022-03-01 RX ORDER — ATORVASTATIN CALCIUM 20 MG/1
TABLET, FILM COATED ORAL
Qty: 90 TABLET | Refills: 0 | Status: SHIPPED | OUTPATIENT
Start: 2022-03-01 | End: 2022-08-24

## 2022-05-31 ENCOUNTER — PATIENT MESSAGE (OUTPATIENT)
Dept: ADMINISTRATIVE | Facility: HOSPITAL | Age: 47
End: 2022-05-31
Payer: COMMERCIAL

## 2022-06-13 DIAGNOSIS — F33.41 RECURRENT MAJOR DEPRESSIVE DISORDER, IN PARTIAL REMISSION: Chronic | ICD-10-CM

## 2022-06-13 NOTE — TELEPHONE ENCOUNTER
No new care gaps identified.  [facility] Embedded Care Gaps. Reference number: [id number]. 6/13/2022   12:30:27 AM CDT

## 2022-06-14 ENCOUNTER — PATIENT MESSAGE (OUTPATIENT)
Dept: FAMILY MEDICINE | Facility: CLINIC | Age: 47
End: 2022-06-14
Payer: COMMERCIAL

## 2022-06-14 RX ORDER — BUPROPION HYDROCHLORIDE 300 MG/1
TABLET ORAL
Qty: 90 TABLET | Refills: 0 | OUTPATIENT
Start: 2022-06-14

## 2022-06-14 NOTE — TELEPHONE ENCOUNTER
Provider Staff:     Action required for this patient.    Please note Refusal of medication.            Requested Prescriptions     Refused Prescriptions Disp Refills    buPROPion (WELLBUTRIN XL) 300 MG 24 hr tablet [Pharmacy Med Name: BUPROPION HCL  MG TABLET] 90 tablet 0     Sig: TAKE 1 TABLET BY MOUTH EVERY DAY     Refused By: KWAN MOREAU     Reason for Refusal: Patient needs an appointment      Thanks!  [facility]   Note composed: 06/14/2022 3:26 PM

## 2022-06-14 NOTE — TELEPHONE ENCOUNTER
Refill Routing Note   Medication(s) are not appropriate for processing by [facility] for the following reason(s):      - Patient has been seen in the ED/Hospital since the last PCP visit    [facility] action(s):  Defer          Medication reconciliation completed: No     Appointments  past 12m or future 3m with PCP    Date Provider   Last Visit   4/13/2021 Didi Conteh MD   Next Visit   Visit date not found Didi Conteh MD   ED visits in past 90 days: 0        Note composed:6:48 AM 06/14/2022

## 2022-08-24 ENCOUNTER — LAB VISIT (OUTPATIENT)
Dept: LAB | Facility: HOSPITAL | Age: 47
End: 2022-08-24
Attending: FAMILY MEDICINE
Payer: COMMERCIAL

## 2022-08-24 ENCOUNTER — OFFICE VISIT (OUTPATIENT)
Dept: FAMILY MEDICINE | Facility: CLINIC | Age: 47
End: 2022-08-24
Payer: COMMERCIAL

## 2022-08-24 VITALS
HEART RATE: 93 BPM | HEIGHT: 67 IN | WEIGHT: 216.19 LBS | BODY MASS INDEX: 33.93 KG/M2 | DIASTOLIC BLOOD PRESSURE: 80 MMHG | SYSTOLIC BLOOD PRESSURE: 138 MMHG | OXYGEN SATURATION: 97 %

## 2022-08-24 DIAGNOSIS — F33.41 RECURRENT MAJOR DEPRESSIVE DISORDER, IN PARTIAL REMISSION: Chronic | ICD-10-CM

## 2022-08-24 DIAGNOSIS — R97.1 ELEVATED CA-125: ICD-10-CM

## 2022-08-24 DIAGNOSIS — Z23 IMMUNIZATION DUE: ICD-10-CM

## 2022-08-24 DIAGNOSIS — G47.00 INSOMNIA, UNSPECIFIED TYPE: ICD-10-CM

## 2022-08-24 DIAGNOSIS — Z00.00 WELLNESS EXAMINATION: ICD-10-CM

## 2022-08-24 DIAGNOSIS — I10 ESSENTIAL HYPERTENSION: ICD-10-CM

## 2022-08-24 DIAGNOSIS — Z12.39 ENCOUNTER FOR SCREENING FOR MALIGNANT NEOPLASM OF BREAST, UNSPECIFIED SCREENING MODALITY: ICD-10-CM

## 2022-08-24 DIAGNOSIS — E03.9 HYPOTHYROIDISM, UNSPECIFIED TYPE: ICD-10-CM

## 2022-08-24 DIAGNOSIS — Z00.00 WELLNESS EXAMINATION: Primary | ICD-10-CM

## 2022-08-24 DIAGNOSIS — J45.20 RAD (REACTIVE AIRWAY DISEASE), MILD INTERMITTENT, UNCOMPLICATED: ICD-10-CM

## 2022-08-24 LAB
25(OH)D3+25(OH)D2 SERPL-MCNC: 17 NG/ML (ref 30–96)
ALBUMIN SERPL BCP-MCNC: 4.4 G/DL (ref 3.5–5.2)
ALP SERPL-CCNC: 57 U/L (ref 55–135)
ALT SERPL W/O P-5'-P-CCNC: 96 U/L (ref 10–44)
ANION GAP SERPL CALC-SCNC: 11 MMOL/L (ref 8–16)
AST SERPL-CCNC: 60 U/L (ref 10–40)
BASOPHILS # BLD AUTO: 0.09 K/UL (ref 0–0.2)
BASOPHILS NFR BLD: 1.3 % (ref 0–1.9)
BILIRUB SERPL-MCNC: 0.8 MG/DL (ref 0.1–1)
BUN SERPL-MCNC: 13 MG/DL (ref 6–20)
CALCIUM SERPL-MCNC: 10.5 MG/DL (ref 8.7–10.5)
CANCER AG125 SERPL-ACNC: 17 U/ML (ref 0–30)
CHLORIDE SERPL-SCNC: 100 MMOL/L (ref 95–110)
CHOLEST SERPL-MCNC: 336 MG/DL (ref 120–199)
CHOLEST/HDLC SERPL: 7.1 {RATIO} (ref 2–5)
CO2 SERPL-SCNC: 27 MMOL/L (ref 23–29)
CREAT SERPL-MCNC: 0.8 MG/DL (ref 0.5–1.4)
DIFFERENTIAL METHOD: ABNORMAL
EOSINOPHIL # BLD AUTO: 0.4 K/UL (ref 0–0.5)
EOSINOPHIL NFR BLD: 6.1 % (ref 0–8)
ERYTHROCYTE [DISTWIDTH] IN BLOOD BY AUTOMATED COUNT: 11.8 % (ref 11.5–14.5)
EST. GFR  (NO RACE VARIABLE): >60 ML/MIN/1.73 M^2
ESTIMATED AVG GLUCOSE: 108 MG/DL (ref 68–131)
GLUCOSE SERPL-MCNC: 104 MG/DL (ref 70–110)
HBA1C MFR BLD: 5.4 % (ref 4–5.6)
HCT VFR BLD AUTO: 40.8 % (ref 37–48.5)
HDLC SERPL-MCNC: 47 MG/DL (ref 40–75)
HDLC SERPL: 14 % (ref 20–50)
HGB BLD-MCNC: 13.9 G/DL (ref 12–16)
IMM GRANULOCYTES # BLD AUTO: 0.05 K/UL (ref 0–0.04)
IMM GRANULOCYTES NFR BLD AUTO: 0.7 % (ref 0–0.5)
LDLC SERPL CALC-MCNC: 248.2 MG/DL (ref 63–159)
LYMPHOCYTES # BLD AUTO: 2 K/UL (ref 1–4.8)
LYMPHOCYTES NFR BLD: 28.7 % (ref 18–48)
MCH RBC QN AUTO: 33.6 PG (ref 27–31)
MCHC RBC AUTO-ENTMCNC: 34.1 G/DL (ref 32–36)
MCV RBC AUTO: 99 FL (ref 82–98)
MONOCYTES # BLD AUTO: 0.4 K/UL (ref 0.3–1)
MONOCYTES NFR BLD: 5.5 % (ref 4–15)
NEUTROPHILS # BLD AUTO: 4 K/UL (ref 1.8–7.7)
NEUTROPHILS NFR BLD: 57.7 % (ref 38–73)
NONHDLC SERPL-MCNC: 289 MG/DL
NRBC BLD-RTO: 0 /100 WBC
PLATELET # BLD AUTO: 267 K/UL (ref 150–450)
PMV BLD AUTO: 11 FL (ref 9.2–12.9)
POTASSIUM SERPL-SCNC: 3.3 MMOL/L (ref 3.5–5.1)
PROT SERPL-MCNC: 7.7 G/DL (ref 6–8.4)
RBC # BLD AUTO: 4.14 M/UL (ref 4–5.4)
SODIUM SERPL-SCNC: 138 MMOL/L (ref 136–145)
T4 FREE SERPL-MCNC: 0.9 NG/DL (ref 0.71–1.51)
TRIGL SERPL-MCNC: 204 MG/DL (ref 30–150)
TSH SERPL DL<=0.005 MIU/L-ACNC: 1.04 UIU/ML (ref 0.4–4)
VIT B12 SERPL-MCNC: 711 PG/ML (ref 210–950)
WBC # BLD AUTO: 6.94 K/UL (ref 3.9–12.7)

## 2022-08-24 PROCEDURE — 99999 PR PBB SHADOW E&M-EST. PATIENT-LVL III: CPT | Mod: PBBFAC,,, | Performed by: FAMILY MEDICINE

## 2022-08-24 PROCEDURE — 85025 COMPLETE CBC W/AUTO DIFF WBC: CPT | Performed by: FAMILY MEDICINE

## 2022-08-24 PROCEDURE — 99999 PR PBB SHADOW E&M-EST. PATIENT-LVL III: ICD-10-PCS | Mod: PBBFAC,,, | Performed by: FAMILY MEDICINE

## 2022-08-24 PROCEDURE — 36415 COLL VENOUS BLD VENIPUNCTURE: CPT | Mod: PN | Performed by: FAMILY MEDICINE

## 2022-08-24 PROCEDURE — 99396 PR PREVENTIVE VISIT,EST,40-64: ICD-10-PCS | Mod: 25,S$GLB,, | Performed by: FAMILY MEDICINE

## 2022-08-24 PROCEDURE — 99396 PREV VISIT EST AGE 40-64: CPT | Mod: 25,S$GLB,, | Performed by: FAMILY MEDICINE

## 2022-08-24 PROCEDURE — 84443 ASSAY THYROID STIM HORMONE: CPT | Performed by: FAMILY MEDICINE

## 2022-08-24 PROCEDURE — 82306 VITAMIN D 25 HYDROXY: CPT | Performed by: FAMILY MEDICINE

## 2022-08-24 PROCEDURE — 82607 VITAMIN B-12: CPT | Performed by: FAMILY MEDICINE

## 2022-08-24 PROCEDURE — 80053 COMPREHEN METABOLIC PANEL: CPT | Performed by: FAMILY MEDICINE

## 2022-08-24 PROCEDURE — 86304 IMMUNOASSAY TUMOR CA 125: CPT | Performed by: FAMILY MEDICINE

## 2022-08-24 PROCEDURE — 90677 PCV20 VACCINE IM: CPT | Mod: S$GLB,,, | Performed by: FAMILY MEDICINE

## 2022-08-24 PROCEDURE — 90471 PNEUMOCOCCAL CONJUGATE VACCINE 20-VALENT: ICD-10-PCS | Mod: S$GLB,,, | Performed by: FAMILY MEDICINE

## 2022-08-24 PROCEDURE — 90715 TDAP VACCINE 7 YRS/> IM: CPT | Mod: S$GLB,,, | Performed by: FAMILY MEDICINE

## 2022-08-24 PROCEDURE — 83036 HEMOGLOBIN GLYCOSYLATED A1C: CPT | Performed by: FAMILY MEDICINE

## 2022-08-24 PROCEDURE — 90472 TDAP VACCINE GREATER THAN OR EQUAL TO 7YO IM: ICD-10-PCS | Mod: S$GLB,,, | Performed by: FAMILY MEDICINE

## 2022-08-24 PROCEDURE — 90471 IMMUNIZATION ADMIN: CPT | Mod: S$GLB,,, | Performed by: FAMILY MEDICINE

## 2022-08-24 PROCEDURE — 84439 ASSAY OF FREE THYROXINE: CPT | Performed by: FAMILY MEDICINE

## 2022-08-24 PROCEDURE — 80061 LIPID PANEL: CPT | Performed by: FAMILY MEDICINE

## 2022-08-24 PROCEDURE — 90472 IMMUNIZATION ADMIN EACH ADD: CPT | Mod: S$GLB,,, | Performed by: FAMILY MEDICINE

## 2022-08-24 PROCEDURE — 90677 PNEUMOCOCCAL CONJUGATE VACCINE 20-VALENT: ICD-10-PCS | Mod: S$GLB,,, | Performed by: FAMILY MEDICINE

## 2022-08-24 PROCEDURE — 90715 TDAP VACCINE GREATER THAN OR EQUAL TO 7YO IM: ICD-10-PCS | Mod: S$GLB,,, | Performed by: FAMILY MEDICINE

## 2022-08-24 RX ORDER — BUPROPION HYDROCHLORIDE 300 MG/1
300 TABLET ORAL DAILY
Qty: 90 TABLET | Refills: 3 | Status: SHIPPED | OUTPATIENT
Start: 2022-08-24 | End: 2022-12-29 | Stop reason: DRUGHIGH

## 2022-08-24 RX ORDER — HYDROXYZINE HYDROCHLORIDE 25 MG/1
25 TABLET, FILM COATED ORAL 3 TIMES DAILY PRN
Qty: 90 TABLET | Refills: 1 | Status: SHIPPED | OUTPATIENT
Start: 2022-08-24 | End: 2022-11-30

## 2022-08-24 RX ORDER — HYDROCHLOROTHIAZIDE 25 MG/1
25 TABLET ORAL DAILY
Qty: 90 TABLET | Refills: 3 | Status: SHIPPED | OUTPATIENT
Start: 2022-08-24 | End: 2023-03-02 | Stop reason: SDUPTHER

## 2022-08-24 RX ORDER — LEVOTHYROXINE SODIUM 50 UG/1
50 TABLET ORAL
Qty: 90 TABLET | Refills: 3 | Status: SHIPPED | OUTPATIENT
Start: 2022-08-24 | End: 2023-08-22

## 2022-08-24 RX ORDER — ALBUTEROL SULFATE 90 UG/1
2 AEROSOL, METERED RESPIRATORY (INHALATION) EVERY 6 HOURS PRN
Qty: 18 G | Refills: 11 | Status: SHIPPED | OUTPATIENT
Start: 2022-08-24

## 2022-08-24 NOTE — PROGRESS NOTES
THIS DOCUMENT WAS MADE IN PART WITH VOICE RECOGNITION SOFTWARE.  OCCASIONALLY THIS SOFTWARE WILL MISINTERPRET WORDS OR PHRASES.    Assessment and Plan:    1. Wellness examination  CBC Auto Differential    Comprehensive Metabolic Panel    Lipid Panel    Hemoglobin A1C    TSH    T4, Free    Urinalysis, Reflex to Urine Culture Urine, Clean Catch    Vitamin D    Vitamin B12   2. Immunization due  (In Office Administered) Tdap Vaccine    (In Office Administered) Pneumococcal Conjugate Vaccine (20 Valent) (IM)   3. Encounter for screening for malignant neoplasm of breast, unspecified screening modality     4. RAD (reactive airway disease), mild intermittent, uncomplicated  albuterol (PROVENTIL/VENTOLIN HFA) 90 mcg/actuation inhaler   5. Insomnia, unspecified type  hydrOXYzine HCL (ATARAX) 25 MG tablet   6. Recurrent major depressive disorder, in partial remission  buPROPion (WELLBUTRIN XL) 300 MG 24 hr tablet   7. Essential hypertension  hydroCHLOROthiazide (HYDRODIURIL) 25 MG tablet   8. Hypothyroidism, unspecified type  levothyroxine (SYNTHROID) 50 MCG tablet   9. Elevated CA-125  CANCER ANTIGEN 125     Wellness labs     Vaccines as above    Refilled meds    Recheck ca125     F/u yearly       ______________________________________________________________________  Subjective:    Chief Complaint:  Chief Complaint   Patient presents with    Annual Exam        HPI:  Ivory is a 47 y.o. year old       Patient here today for annual wellness exam        No complaints our concerns       Past Medical History:  Past Medical History:   Diagnosis Date    Abdominal mass, left lower quadrant 11/2016    Anxiety disorder 2010    Asthma     Bilateral ovarian cysts     Cancer antigen 125 () elevation 11/2016    High cholesterol     History of hemorrhoids     Incisional abscess     right breast I/D abscess 2012    MTHFR mutation     Thrombocytopenia complicating pregnancy 2010 and 2012       Past Surgical History:  Past  Surgical History:   Procedure Laterality Date    BREAST BIOPSY Right 2011    BREAST CYST INCISION AND DRAINAGE      right breast abscess I/D 2012    COLONOSCOPY  2014    COLONOSCOPY N/A 10/28/2021    Procedure: COLONOSCOPY;  Surgeon: Jeremy Wheatley Jr., MD;  Location: [facility];  Service: Endoscopy;  Laterality: N/A;    essure  2012    HYSTERECTOMY      partial - secondary to ovarian mass -benign        Family History:  Family History   Problem Relation Age of Onset    Celiac disease Maternal Aunt     Hepatitis Maternal Aunt         autoimmune    Colon cancer Maternal Grandmother     Cancer Maternal Grandmother     Hepatitis Mother         autoimmune    Hypertension Mother     Pulmonary embolism Mother     Hypertension Father     Hyperlipidemia Father     Heart disease Father     Early death Father 53        MI    No Known Problems Brother     Breast cancer Neg Hx     Diabetes Neg Hx     Miscarriages / Stillbirths Neg Hx     Ovarian cancer Neg Hx     Stroke Neg Hx        Social History:  Social History     Socioeconomic History    Marital status:     Number of children: 2   Tobacco Use    Smoking status: Current Some Day Smoker     Packs/day: 0.25     Years: 2.00     Pack years: 0.50     Types: Cigarettes    Smokeless tobacco: Never Used    Tobacco comment: on wellbutrin, smokes 3 cigs per week   Substance and Sexual Activity    Alcohol use: Yes     Alcohol/week: 10.0 standard drinks     Types: 10 Glasses of wine per week    Drug use: No    Sexual activity: Yes     Partners: Male   Social History Narrative     asked for separation and pt and 2 children moved out a month ago, (July 2015) now live in apartment. 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        Medications:  Current Outpatient Medications on File Prior to Visit   Medication Sig Dispense Refill    buPROPion (WELLBUTRIN XL) 300 MG 24 hr tablet TAKE 1  "TABLET BY MOUTH EVERY DAY 90 tablet 0    cloNIDine (CATAPRES) 0.1 MG tablet Take 0.1 mg by mouth as needed.      hydroCHLOROthiazide (HYDRODIURIL) 25 MG tablet TAKE 1 TABLET BY MOUTH EVERY DAY 90 tablet 1    hydrOXYzine HCL (ATARAX) 25 MG tablet TAKE 1 TABLET (25 MG TOTAL) BY MOUTH 3 (THREE) TIMES DAILY AS NEEDED FOR ANXIETY. 90 tablet 1    levothyroxine (SYNTHROID) 50 MCG tablet TAKE 1 TABLET (50 MCG TOTAL) BY MOUTH BEFORE BREAKFAST. 90 tablet 0    metoprolol succinate (TOPROL-XL) 50 MG 24 hr tablet TAKE 1 TABLET BY MOUTH EVERY DAY 90 tablet 3    albuterol (PROVENTIL/VENTOLIN HFA) 90 mcg/actuation inhaler TAKE 2 PUFFS BY MOUTH EVERY 6 HOURS AS NEEDED FOR WHEEZE (Patient not taking: Reported on 8/24/2022) 18 g 5    atorvastatin (LIPITOR) 20 MG tablet TAKE 1 TABLET BY MOUTH EVERY DAY (Patient not taking: Reported on 8/24/2022) 90 tablet 0    citalopram (CELEXA) 20 MG tablet Take 1 tablet (20 mg total) by mouth once daily. (Patient not taking: Reported on 8/24/2022) 90 tablet 1     No current facility-administered medications on file prior to visit.       Allergies:  Penicillins    Immunizations:  Immunization History   Administered Date(s) Administered    COVID-19, MRNA, LN-S, PF (MODERNA FULL 0.5 ML DOSE) 12/28/2020, 01/25/2021    Influenza 10/28/2018, 10/06/2019    Influenza - Quadrivalent - PF *Preferred* (6 months and older) 10/06/2020    Pneumococcal Polysaccharide - 23 Valent 01/26/2017    Tdap 03/29/2012       Review of Systems:  Review of Systems   All other systems reviewed and are negative.      Objective:    Vitals:  Vitals:    08/24/22 1055   BP: 138/80   Pulse: 93   SpO2: 97%   Weight: 98.1 kg (216 lb 2.6 oz)   Height: 5' 7" (1.702 m)   PainSc: 0-No pain       Physical Exam  Vitals reviewed.   Constitutional:       General: She is not in acute distress.  HENT:      Head: Normocephalic and atraumatic.   Eyes:      Pupils: Pupils are equal, round, and reactive to light.   Cardiovascular:      " Rate and Rhythm: Normal rate and regular rhythm.      Heart sounds: No murmur heard.    No friction rub.   Pulmonary:      Effort: Pulmonary effort is normal.      Breath sounds: Normal breath sounds.   Abdominal:      General: Bowel sounds are normal. There is no distension.      Palpations: Abdomen is soft.      Tenderness: There is no abdominal tenderness.   Musculoskeletal:      Cervical back: Neck supple.   Skin:     General: Skin is warm and dry.      Findings: No rash.   Psychiatric:         Behavior: Behavior normal.             Douglas Holder MD  Family Medicine

## 2022-08-30 DIAGNOSIS — R74.01 TRANSAMINITIS: Primary | ICD-10-CM

## 2022-08-30 DIAGNOSIS — E78.5 DYSLIPIDEMIA: ICD-10-CM

## 2022-08-30 DIAGNOSIS — E55.9 VITAMIN D INSUFFICIENCY: ICD-10-CM

## 2022-08-30 RX ORDER — ERGOCALCIFEROL 1.25 MG/1
50000 CAPSULE ORAL
Qty: 12 CAPSULE | Refills: 3 | Status: SHIPPED | OUTPATIENT
Start: 2022-08-30 | End: 2023-09-21

## 2022-08-30 RX ORDER — ROSUVASTATIN CALCIUM 20 MG/1
20 TABLET, COATED ORAL DAILY
Qty: 90 TABLET | Refills: 3 | Status: SHIPPED | OUTPATIENT
Start: 2022-08-30 | End: 2023-08-22

## 2022-08-31 ENCOUNTER — TELEPHONE (OUTPATIENT)
Dept: FAMILY MEDICINE | Facility: CLINIC | Age: 47
End: 2022-08-31
Payer: COMMERCIAL

## 2022-09-01 ENCOUNTER — PATIENT MESSAGE (OUTPATIENT)
Dept: FAMILY MEDICINE | Facility: CLINIC | Age: 47
End: 2022-09-01
Payer: COMMERCIAL

## 2022-09-01 DIAGNOSIS — R74.01 TRANSAMINITIS: Primary | ICD-10-CM

## 2022-09-02 ENCOUNTER — OFFICE VISIT (OUTPATIENT)
Dept: PODIATRY | Facility: CLINIC | Age: 47
End: 2022-09-02
Payer: COMMERCIAL

## 2022-09-02 DIAGNOSIS — M77.41 METATARSALGIA OF BOTH FEET: ICD-10-CM

## 2022-09-02 DIAGNOSIS — M77.42 METATARSALGIA OF BOTH FEET: ICD-10-CM

## 2022-09-02 DIAGNOSIS — Q66.6 PES VALGUS: Primary | ICD-10-CM

## 2022-09-02 DIAGNOSIS — G57.53 TARSAL TUNNEL SYNDROME OF BOTH LOWER EXTREMITIES: ICD-10-CM

## 2022-09-02 PROCEDURE — 99999 PR PBB SHADOW E&M-EST. PATIENT-LVL III: CPT | Mod: PBBFAC,,, | Performed by: PODIATRIST

## 2022-09-02 PROCEDURE — 99999 PR PBB SHADOW E&M-EST. PATIENT-LVL III: ICD-10-PCS | Mod: PBBFAC,,, | Performed by: PODIATRIST

## 2022-09-02 PROCEDURE — 99214 OFFICE O/P EST MOD 30 MIN: CPT | Mod: S$GLB,,, | Performed by: PODIATRIST

## 2022-09-02 PROCEDURE — 99214 PR OFFICE/OUTPT VISIT, EST, LEVL IV, 30-39 MIN: ICD-10-PCS | Mod: S$GLB,,, | Performed by: PODIATRIST

## 2022-09-02 RX ORDER — MELOXICAM 15 MG/1
15 TABLET ORAL DAILY
Qty: 30 TABLET | Refills: 0 | Status: SHIPPED | OUTPATIENT
Start: 2022-09-02 | End: 2022-10-04 | Stop reason: SDUPTHER

## 2022-09-02 RX ORDER — METHYLPREDNISOLONE 4 MG/1
TABLET ORAL
Qty: 1 EACH | Refills: 0 | Status: SHIPPED | OUTPATIENT
Start: 2022-09-02 | End: 2022-09-30

## 2022-09-02 RX ORDER — GABAPENTIN 300 MG/1
300 CAPSULE ORAL NIGHTLY
Qty: 30 CAPSULE | Refills: 11 | Status: SHIPPED | OUTPATIENT
Start: 2022-09-02 | End: 2023-01-05

## 2022-09-02 NOTE — PROGRESS NOTES
Subjective:      Patient ID: Ivory Cade is a 47 y.o. female.    Chief Complaint: Foot Pain (Bilateral foot pain)    Ivory is a 47 y.o. female who presents to the podiatry clinic  with complaint of  bilateral foot pain. Onset of the symptoms was several years ago. Precipitating event: none known. Current symptoms include: ability to bear weight, but with some pain and burning and tingling bottom of the feet mostly in the ball of the feet . Aggravating factors: any weight bearing and but can happen even when at rest . Symptoms have gradually worsened. Patient has had prior foot problems. Evaluation to date: none. Treatment to date: none. Patients rates pain 2/10 on pain scale.    9/2/22: patient presents for follow up bilateral foot pain mostly now in the ball of the feet with burning pains in the toes by evening. She relates a steroid pack in the past has helped a lot. Went to [facility] and got some of their inserts which helps some.       Review of Systems   Constitutional: Negative for chills and fever.   Cardiovascular:  Negative for claudication and leg swelling.   Respiratory:  Negative for shortness of breath.    Skin:  Positive for nail changes. Negative for itching and rash.   Musculoskeletal:  Negative for muscle cramps, muscle weakness and myalgias.   Gastrointestinal:  Negative for nausea and vomiting.   Neurological:  Negative for focal weakness, loss of balance, numbness and paresthesias.         Objective:      Physical Exam  Constitutional:       General: She is not in acute distress.     Appearance: She is well-developed. She is not diaphoretic.   Cardiovascular:      Pulses:           Dorsalis pedis pulses are 2+ on the right side and 2+ on the left side.        Posterior tibial pulses are 2+ on the right side and 2+ on the left side.      Comments: < 3 sec capillary refill time to toes 1-5 bilateral. Toes and feet are warm to touch proximally with normal distal cooling b/l. There is some  hair growth on the feet and toes b/l. There is no edema b/l. No spider veins or varicosities present b/l.     Musculoskeletal:      Comments: Equinus noted b/l ankles with < 10 deg DF noted. MMT 5/5 in DF/PF/Inv/Ev resistance with no reproduction of pain in any direction. Passive range of motion of ankle and pedal joints is painless b/l.    Medial arch collapse bilateral with weight bearing  There is some tenderness to palpation at the plantar metatarsal heads, none to the interspaces with negative mulders click   Skin:     General: Skin is warm and dry.      Coloration: Skin is not pale.      Findings: No abrasion, bruising, burn, ecchymosis, erythema, laceration, lesion, petechiae or rash.      Nails: There is no clubbing.      Comments: Skin temperature, texture and turgor within normal limits.   Neurological:      Mental Status: She is alert and oriented to person, place, and time.      Sensory: No sensory deficit.      Motor: No tremor, atrophy or abnormal muscle tone.      Comments: Positive tinel sign bilateral.   Psychiatric:         Behavior: Behavior normal.             Assessment:       Encounter Diagnoses   Name Primary?    Pes valgus Yes    Metatarsalgia of both feet     Tarsal tunnel syndrome of both lower extremities          Plan:       Ivory was seen today for foot pain.    Diagnoses and all orders for this visit:    Pes valgus  -     ORTHOTIC DEVICE (DME)    Metatarsalgia of both feet  -     ORTHOTIC DEVICE (DME)    Tarsal tunnel syndrome of both lower extremities  -     ORTHOTIC DEVICE (DME)    I counseled the patient on her conditions, their implications and medical management.    Medrol dose pack for inflammation    Custom orthotic prescription dispensed with a list of places she can go to get them    Avoid barefoot or flats    Started on gabapentin at night and meloxicam for anti inflammatory    Consider diagnostic injections, steroid injections, PT and EMG if pain persists    Hugo Escobar  NASRIN

## 2022-09-06 ENCOUNTER — PATIENT MESSAGE (OUTPATIENT)
Dept: FAMILY MEDICINE | Facility: CLINIC | Age: 47
End: 2022-09-06
Payer: COMMERCIAL

## 2022-09-07 NOTE — TELEPHONE ENCOUNTER
This request does not have anything to do with the liver enzymes.  I would defer to primary care, she can discuss this at her next visit.  She probably needs a follow-up visit with her primary care physician to discuss recent workup of liver enzymes after she gets ultrasound.  Please help her make that appointment and they can discuss rechecking lipid panels at that time

## 2022-09-08 ENCOUNTER — PATIENT MESSAGE (OUTPATIENT)
Dept: FAMILY MEDICINE | Facility: CLINIC | Age: 47
End: 2022-09-08
Payer: COMMERCIAL

## 2022-09-08 DIAGNOSIS — R16.0 HEPATOMEGALY: Primary | ICD-10-CM

## 2022-09-08 DIAGNOSIS — K75.81 STEATOHEPATITIS: ICD-10-CM

## 2022-09-18 ENCOUNTER — PATIENT MESSAGE (OUTPATIENT)
Dept: ADMINISTRATIVE | Facility: OTHER | Age: 47
End: 2022-09-18
Payer: COMMERCIAL

## 2022-09-20 ENCOUNTER — E-VISIT (OUTPATIENT)
Dept: FAMILY MEDICINE | Facility: CLINIC | Age: 47
End: 2022-09-20
Payer: COMMERCIAL

## 2022-09-20 DIAGNOSIS — U07.1 COVID-19: Primary | ICD-10-CM

## 2022-09-20 PROCEDURE — 99421 PR E&M, ONLINE DIGIT, EST, < 7 DAYS, 5-10 MINS: ICD-10-PCS | Mod: S$GLB,,, | Performed by: INTERNAL MEDICINE

## 2022-09-20 PROCEDURE — 99421 OL DIG E/M SVC 5-10 MIN: CPT | Mod: S$GLB,,, | Performed by: INTERNAL MEDICINE

## 2022-09-20 NOTE — PROGRESS NOTES
Patient ID: Ivory Cade is a 47 y.o. female.    Chief Complaint: Cough    The patient initiated a request through [employer] on 9/20/2022 for evaluation and management with a chief complaint of Cough     I evaluated the questionnaire submission on 09/20/2022  .    [facility] Peq Evisit Upper Respitatory/Cough Questionnaire    9/20/2022 10:48 AM CDT - Filed by Patient   Do you agree to participate in an E-Visit? Yes   If you have any of the following symptoms, please present to your local ER or call 911:  I acknowledge   What is the main issue that you would like for your doctor to address today? Antiviral for covid   Are you able to take your vital signs? No   What symptoms do you currently have?  Cough;  Fatigue;  Headache;  Muscle or body aches;  Sore throat   Have you had a fever? Yes   What has been the range of your fever? Less than 100.4   When did your symptoms first appear? 9/18/2022   In the last two weeks, have you been in close contact with someone who has COVID-19? No   In the last two weeks, have you worked or volunteered in a healthcare facility or as a ? Healthcare facilities include a hospital, medical or dental clinic, long-term care facility, or nursing home Yes   Do you live in a long-term care facility, nursing home, or homeless shelter? No   List what you have done or taken to help your symptoms. Vitamins, mucinex   How severe are your symptoms? Moderate   Have you taken an at home Covid test? Yes   What were the results? Positive   Have you been fully vaccinated for COVID? (2 Pfizer, 2 Moderna or 1 Alessandro & Alessandro vaccine injections) Yes   Have you received a booster? Yes   Do you have transportation to get tested for COVID if it is indicated and ordered for you at an [facility] location? Yes   Provide any information you feel is important to your history not asked above    Please attach any relevant images or files           Active Problem List with Overview Notes    Diagnosis Date  Noted    Rectal bleeding 10/28/2021    Insomnia 10/06/2020    Recurrent major depressive disorder, in partial remission 11/16/2017    Onychomycosis due to dermatophyte 04/06/2017    Hallux abducto valgus 04/06/2017    Bursitis 04/06/2017    Pes valgus 04/06/2017    Neuroma 04/06/2017    Hallux limitus 04/06/2017    Asthma 01/26/2017    MTHFR mutation 01/26/2017    Plantar fasciitis 07/24/2014    HLD (hyperlipidemia) 07/24/2014      Recent Labs Obtained:  No visits with results within 7 Day(s) from this visit.   Latest known visit with results is:   Lab Visit on 09/02/2022   Component Date Value Ref Range Status    Smooth Muscle Ab 09/02/2022 <1:20  <1:20 Final    Comment: INTERPRETIVE INFORMATION:  Smooth Muscle Ab, IgG Titer  Less than 1:20 ........ Negative - No antibody detected.  1:20 - 1:80  .......... Weak Positive - Suggest repeat  in two to three weeks with fresh  specimen.  1:160 or greater ...... Positive - Suggestive of  autoimmune hepatitis or chronic  active hepatitis.  Performed By: [email or web address]  [address] [place] [id number]  : Tyler Amor MD, PhD      EDWIN Screen 09/02/2022 None Detected  None Detected Final    Comment: If suspicion of connective tissue disease is strong and EDWIN   EIA is negative, consider testing for EDWIN by IFA ([id number]).  INTERPRETIVE INFORMATION: Anti-Nuclear Antibodies (EDWIN),   IgG by ALLEN  Antinuclear Antibodies (EDWIN), IgG by ALLEN: EDWIN specimens   are screened using enzyme-linked immunosorbent assay   (ALLEN) methodology. All ALLEN results reported as Detected   are further tested by indirect fluorescent assay (IFA)   using HEp-2 substrate with an IgG-specific conjugate. The   EDWIN ALLEN screen is designed to detect antibodies against   dsDNA, histones, SS-A (Ro), SS-B (La), Gomez, Gomez/RNP,   Scl-70, Vidhya-1, centromeric proteins, other antigens   extracted from the HEp-2 cell nucleus. EDWIN ALLEN assays   have been reported to have lower  sensitivities than EDWIN IFA   for systemic autoimmune rheumatic diseases (SARD).  Negative results do not necessarily rule out SARD.  Performed By: [facility]  [address] [place] [address]  : Tyler velasquez MD, PhD         Encounter Diagnosis   Name Primary?    COVID-19 Yes        No orders of the defined types were placed in this encounter.     Medications Ordered This Encounter   Medications    nirmatrelvir-ritonavir 300 mg (150 mg x 2)-100 mg copackaged tablets (EUA)     Sig: Take 3 tablets by mouth 2 (two) times daily for 5 days. Each dose contains 2 nirmatrelvir (pink tablets) and 1 ritonavir (white tablet). Take all 3 tablets together     Dispense:  30 tablet     Refill:  0     Order Specific Question:   Per EUA criteria, patient has a positive COVID test AND symptom onset </= 5 days     Answer:   Yes        E-Visit Time Tracking:    Day 1 Time (in minutes): 6     Total Time (in minutes): 6       Response to patient:  Good evening,     I have reviewed your responses and have sent in Paxlovid to your pharmacy. I would like you to stop the rosuvastatin for the 5 days that you take paxlovid. It is possible to get rebound symptoms after finishing this medication. Let me know if you need something for cough.     Didi Conteh MD

## 2022-09-24 ENCOUNTER — TELEPHONE (OUTPATIENT)
Dept: HEPATOLOGY | Facility: CLINIC | Age: 47
End: 2022-09-24
Payer: COMMERCIAL

## 2022-09-30 ENCOUNTER — OFFICE VISIT (OUTPATIENT)
Dept: FAMILY MEDICINE | Facility: CLINIC | Age: 47
End: 2022-09-30
Payer: COMMERCIAL

## 2022-09-30 ENCOUNTER — LAB VISIT (OUTPATIENT)
Dept: LAB | Facility: HOSPITAL | Age: 47
End: 2022-09-30
Attending: INTERNAL MEDICINE
Payer: COMMERCIAL

## 2022-09-30 VITALS
WEIGHT: 208.69 LBS | RESPIRATION RATE: 14 BRPM | SYSTOLIC BLOOD PRESSURE: 130 MMHG | TEMPERATURE: 98 F | HEART RATE: 93 BPM | DIASTOLIC BLOOD PRESSURE: 68 MMHG | BODY MASS INDEX: 32.75 KG/M2 | HEIGHT: 67 IN

## 2022-09-30 DIAGNOSIS — R16.0 HEPATOMEGALY: Primary | ICD-10-CM

## 2022-09-30 DIAGNOSIS — K75.81 STEATOHEPATITIS: ICD-10-CM

## 2022-09-30 DIAGNOSIS — R16.0 HEPATOMEGALY: ICD-10-CM

## 2022-09-30 LAB
ALBUMIN SERPL BCP-MCNC: 3.9 G/DL (ref 3.5–5.2)
ALP SERPL-CCNC: 57 U/L (ref 55–135)
ALT SERPL W/O P-5'-P-CCNC: 26 U/L (ref 10–44)
AST SERPL-CCNC: 17 U/L (ref 10–40)
BILIRUB DIRECT SERPL-MCNC: 0.2 MG/DL (ref 0.1–0.3)
BILIRUB SERPL-MCNC: 0.5 MG/DL (ref 0.1–1)
PROT SERPL-MCNC: 7 G/DL (ref 6–8.4)

## 2022-09-30 PROCEDURE — 80076 HEPATIC FUNCTION PANEL: CPT | Performed by: INTERNAL MEDICINE

## 2022-09-30 PROCEDURE — 99214 PR OFFICE/OUTPT VISIT, EST, LEVL IV, 30-39 MIN: ICD-10-PCS | Mod: S$GLB,,, | Performed by: INTERNAL MEDICINE

## 2022-09-30 PROCEDURE — 99999 PR PBB SHADOW E&M-EST. PATIENT-LVL IV: ICD-10-PCS | Mod: PBBFAC,,, | Performed by: INTERNAL MEDICINE

## 2022-09-30 PROCEDURE — 36415 COLL VENOUS BLD VENIPUNCTURE: CPT | Mod: PN | Performed by: INTERNAL MEDICINE

## 2022-09-30 PROCEDURE — 99999 PR PBB SHADOW E&M-EST. PATIENT-LVL IV: CPT | Mod: PBBFAC,,, | Performed by: INTERNAL MEDICINE

## 2022-09-30 PROCEDURE — 99214 OFFICE O/P EST MOD 30 MIN: CPT | Mod: S$GLB,,, | Performed by: INTERNAL MEDICINE

## 2022-09-30 NOTE — PROGRESS NOTES
Assessment and Plan:    1. Hepatomegaly  - Hepatic Function Panel; Future    2. Steatohepatitis  - Hepatic Function Panel; Future    Most likely 2/2 combination of poor diet and substantially increased EtOH consumption. In the last month she has stopped drinking entirely, made significant diet improvements, and has lost 20 lbs on her home scale. Will recheck LFTs.     Discussed additional workup of steatohepatitis as she has a family history of autoimmune hepatitis (aunt, ended up with liver transplant), she will think about scheduling with hepatology and let me know if she decides not to see hepatology. If she does not want to see hepatology, would want to recheck LFTs and liver imaging (likely MRI, see US report) in 3 months.  ______________________________________________________________________  Subjective:    Chief Complaint:  Follow up labs    HPI:  Ivory is a 47 y.o. year old female here for follow up after labs with Dr. Holder.     Labs from annual 8/24 showed elevated LFTs (AST 60/ALT 90). Reported she had been drinking at the time, but stopped drinking after the labs. LFTs on follow up up to AST 71, . Viral hepatitis testing negative, EDWIN, Anti-Sm, alpha-1 antitrypsin, and ceruloplasmin all negative. Ferritin slightly elevated. Does have family history of autoimmune hepatitis. US earlier this month showed hepatomegaly with increased echotexture suggestive of hepatic steatosis, as well as a focal area that likely represents sparing of fatty infiltration, but recommended CT or MRI to better evaluate. She has not yet seen hepatology, referral was placed.    She reports that she had been drinking a lot. Had been up to 5+ drinks per day. Has been 30 days sober as of today.    Labs from annual also showed significant HLD, had been off of atorvastatin at the time that the labs were drawn. She was started on rosuvastatin with the LDL up to 248, TGs 204.     She had been doing keto diet and was eating a lot of  meat before these labs were drawn.   She has lost some weight    Medications:  Current Outpatient Medications on File Prior to Visit   Medication Sig Dispense Refill    albuterol (PROVENTIL/VENTOLIN HFA) 90 mcg/actuation inhaler Inhale 2 puffs into the lungs every 6 (six) hours as needed for Wheezing. Rescue 18 g 11    buPROPion (WELLBUTRIN XL) 300 MG 24 hr tablet Take 1 tablet (300 mg total) by mouth once daily. 90 tablet 3    cloNIDine (CATAPRES) 0.1 MG tablet Take 0.1 mg by mouth as needed.      ergocalciferol (ERGOCALCIFEROL) 50,000 unit Cap Take 1 capsule (50,000 Units total) by mouth every 7 days. 12 capsule 3    gabapentin (NEURONTIN) 300 MG capsule Take 1 capsule (300 mg total) by mouth every evening. 30 capsule 11    hydroCHLOROthiazide (HYDRODIURIL) 25 MG tablet Take 1 tablet (25 mg total) by mouth once daily. 90 tablet 3    hydrOXYzine HCL (ATARAX) 25 MG tablet Take 1 tablet (25 mg total) by mouth 3 (three) times daily as needed for Anxiety. 90 tablet 1    levothyroxine (SYNTHROID) 50 MCG tablet Take 1 tablet (50 mcg total) by mouth before breakfast. 90 tablet 3    meloxicam (MOBIC) 15 MG tablet Take 1 tablet (15 mg total) by mouth once daily. 30 tablet 0    rosuvastatin (CRESTOR) 20 MG tablet Take 1 tablet (20 mg total) by mouth once daily. 90 tablet 3    methylPREDNISolone (MEDROL DOSEPACK) 4 mg tablet use as directed 1 each 0     No current facility-administered medications on file prior to visit.       Review of Systems:  Review of Systems   Constitutional:  Negative for activity change and unexpected weight change.   HENT:  Negative for hearing loss, rhinorrhea and trouble swallowing.    Eyes:  Negative for discharge and visual disturbance.   Respiratory:  Negative for chest tightness and wheezing.    Cardiovascular:  Negative for chest pain and palpitations.   Gastrointestinal:  Negative for blood in stool, constipation, diarrhea and vomiting.   Endocrine: Negative for polydipsia and polyuria.  "  Genitourinary:  Negative for difficulty urinating, dysuria, hematuria and menstrual problem.   Musculoskeletal:  Negative for arthralgias, joint swelling and neck pain.   Neurological:  Negative for weakness and headaches.   Psychiatric/Behavioral:  Negative for confusion and dysphoric mood.      Entered by patient and reviewed and updated during visit        Past Medical History:  Past Medical History:   Diagnosis Date    Abdominal mass, left lower quadrant 11/2016    Anxiety disorder 2010    Asthma     Bilateral ovarian cysts     Cancer antigen 125 () elevation 11/2016    High cholesterol     History of hemorrhoids     Incisional abscess     right breast I/D abscess 2012    MTHFR mutation     Thrombocytopenia complicating pregnancy 2010 and 2012       Objective:    Vitals:  Vitals:    09/30/22 1009   BP: 130/68   Pulse: 93   Resp: 14   Temp: 98.3 °F (36.8 °C)   TempSrc: Oral   Weight: 94.7 kg (208 lb 11 oz)   Height: 5' 7" (1.702 m)   PainSc: 0-No pain       Physical Exam  Vitals reviewed.   Constitutional:       General: She is not in acute distress.     Appearance: She is well-developed.   Eyes:      General:         Right eye: No discharge.         Left eye: No discharge.      Conjunctiva/sclera: Conjunctivae normal.   Cardiovascular:      Rate and Rhythm: Normal rate and regular rhythm.   Pulmonary:      Effort: Pulmonary effort is normal. No respiratory distress.   Skin:     General: Skin is warm and dry.   Neurological:      Mental Status: She is alert and oriented to person, place, and time.   Psychiatric:         Behavior: Behavior normal.         Thought Content: Thought content normal.         Judgment: Judgment normal.       Data:  RUQ US  Impression:     1. Hepatomegaly with increased echotexture to the liver suggestive of hepatic steatosis.  2. Focal hypoechoic area along the undersurface of the left hepatic lobe that measures approximately 2.5 x 1.9 x 2.6 cm and likely represents sparing of " fatty infiltration.  If further evaluation is necessary either a dedicated CT scan of the liver with and without contrast or an MRI of the liver is suggested.  3. Rest of the examination is unremarkable.       Didi Conteh MD  Internal Medicine

## 2022-10-07 ENCOUNTER — TELEPHONE (OUTPATIENT)
Dept: HEPATOLOGY | Facility: CLINIC | Age: 47
End: 2022-10-07
Payer: COMMERCIAL

## 2022-10-29 NOTE — LETTER
January 20, 2017      Claire Serra, NP  [address]  [facility] [address]           [place] [facility]  [address] [facility] [address] [place] [address]  Phone: [phone]  Fax: [phone]          Patient: Ivory Cade   MR Number: [id number]   YOB: 1975   Date of Visit: 1/20/2017       Dear Claire Serra:    Thank you for referring Ivory Cade to me for evaluation. Attached you will find relevant portions of my assessment and plan of care.    If you have questions, please do not hesitate to call me. I look forward to following Ivory Cade along with you.    Sincerely,    Lena Graf NP    Enclosure  CC:  No Recipients    If you would like to receive this communication electronically, please contact [email or web address] or [phone] to request more information on [employer] Link access.    For providers and/or their staff who would like to refer a patient to [facility], please contact us through our one-stop-shop provider referral line, [facility] , at [phone].    If you feel you have received this communication in error or would no longer like to receive these types of communications, please e-mail [email or web address]          82.5

## 2022-11-01 ENCOUNTER — PATIENT MESSAGE (OUTPATIENT)
Dept: FAMILY MEDICINE | Facility: CLINIC | Age: 47
End: 2022-11-01
Payer: COMMERCIAL

## 2022-11-09 ENCOUNTER — OFFICE VISIT (OUTPATIENT)
Dept: FAMILY MEDICINE | Facility: CLINIC | Age: 47
End: 2022-11-09
Payer: COMMERCIAL

## 2022-11-09 VITALS — BODY MASS INDEX: 32.65 KG/M2 | HEIGHT: 67 IN | WEIGHT: 208 LBS

## 2022-11-09 DIAGNOSIS — F33.41 RECURRENT MAJOR DEPRESSIVE DISORDER, IN PARTIAL REMISSION: Primary | ICD-10-CM

## 2022-11-09 PROCEDURE — 99214 PR OFFICE/OUTPT VISIT, EST, LEVL IV, 30-39 MIN: ICD-10-PCS | Mod: 95,,, | Performed by: INTERNAL MEDICINE

## 2022-11-09 PROCEDURE — 99214 OFFICE O/P EST MOD 30 MIN: CPT | Mod: 95,,, | Performed by: INTERNAL MEDICINE

## 2022-11-09 RX ORDER — VENLAFAXINE HYDROCHLORIDE 37.5 MG/1
37.5 CAPSULE, EXTENDED RELEASE ORAL DAILY
Qty: 30 CAPSULE | Refills: 5 | Status: SHIPPED | OUTPATIENT
Start: 2022-11-09 | End: 2022-12-14 | Stop reason: SDUPTHER

## 2022-11-09 NOTE — PROGRESS NOTES
Assessment and Plan:    1. Recurrent major depressive disorder, in partial remission  Discussed medication options for depression with possible seasonal component and have elected to start venlafaxine in addition to wellbutrin. Plan to increase to 75 mg daily after 1 month if no side effects. We discussed how to take this medication and the likely time to effect of this medication. We discussed potential side effects and to let me know if she is having any of these side effects. Follow up in 2 months.  - Ambulatory referral/consult to Psychiatry; Future  - venlafaxine (EFFEXOR-XR) 37.5 MG 24 hr capsule; Take 1 capsule (37.5 mg total) by mouth once daily.  Dispense: 30 capsule; Refill: 5    ______________________________________________________________________  Subjective:    Chief Complaint:  Discuss depression    HPI:  Ivory is a 47 y.o. year old female patient with telemedicine visit today as consultation to discuss depression.    The patient location is: home in [place]  The chief complaint leading to consultation is: as above    Visit type: audiovisual    Face to Face time with patient: 15 minutes  20 minutes of total time spent on the encounter, which includes face to face time and non-face to face time preparing to see the patient (eg, review of tests), Obtaining and/or reviewing separately obtained history, Documenting clinical information in the electronic or other health record, Independently interpreting results (not separately reported) and communicating results to the patient/family/caregiver, or Care coordination (not separately reported).         Each patient to whom he or she provides medical services by telemedicine is:  (1) informed of the relationship between the physician and patient and the respective role of any other health care provider with respect to management of the patient; and (2) notified that he or she may decline to receive medical services by telemedicine and may withdraw from such care at  any time.    Notes:     She had previously been doing much better from the perspective of mood, but everything changed a few weeks ago. She wonders about seasonal affective disorder as she reports that she has had issues in the past with worsening of depression when it is darker during the winter months.     She had been on celexa and effexor in the past. She had been on zoloft 20 years ago. Has not ever been on lexapro.      Medications:  Current Outpatient Medications on File Prior to Visit   Medication Sig Dispense Refill    albuterol (PROVENTIL/VENTOLIN HFA) 90 mcg/actuation inhaler Inhale 2 puffs into the lungs every 6 (six) hours as needed for Wheezing. Rescue 18 g 11    buPROPion (WELLBUTRIN XL) 300 MG 24 hr tablet Take 1 tablet (300 mg total) by mouth once daily. 90 tablet 3    cloNIDine (CATAPRES) 0.1 MG tablet Take 0.1 mg by mouth as needed.      ergocalciferol (ERGOCALCIFEROL) 50,000 unit Cap Take 1 capsule (50,000 Units total) by mouth every 7 days. 12 capsule 3    gabapentin (NEURONTIN) 300 MG capsule Take 1 capsule (300 mg total) by mouth every evening. 30 capsule 11    hydroCHLOROthiazide (HYDRODIURIL) 25 MG tablet Take 1 tablet (25 mg total) by mouth once daily. 90 tablet 3    hydrOXYzine HCL (ATARAX) 25 MG tablet Take 1 tablet (25 mg total) by mouth 3 (three) times daily as needed for Anxiety. 90 tablet 1    levothyroxine (SYNTHROID) 50 MCG tablet Take 1 tablet (50 mcg total) by mouth before breakfast. 90 tablet 3    meloxicam (MOBIC) 15 MG tablet Take 1 tablet (15 mg total) by mouth once daily. 30 tablet 0    rosuvastatin (CRESTOR) 20 MG tablet Take 1 tablet (20 mg total) by mouth once daily. 90 tablet 3     No current facility-administered medications on file prior to visit.       Review of Systems:  Review of Systems   Constitutional:  Negative for fatigue and unexpected weight change.   Psychiatric/Behavioral:  Positive for dysphoric mood and sleep disturbance. The patient is nervous/anxious.   "    Past Medical History:  Past Medical History:   Diagnosis Date    Abdominal mass, left lower quadrant 11/2016    Anxiety disorder 2010    Asthma     Bilateral ovarian cysts     Cancer antigen 125 () elevation 11/2016    High cholesterol     History of hemorrhoids     Incisional abscess     right breast I/D abscess 2012    MTHFR mutation     Thrombocytopenia complicating pregnancy 2010 and 2012       Objective:    Vitals:  Vitals:    11/09/22 1637   Weight: 94.3 kg (208 lb)   Height: 5' 7" (1.702 m)       Physical Exam  Constitutional:       General: She is not in acute distress.     Appearance: She is well-developed.   HENT:      Head: Normocephalic and atraumatic.   Pulmonary:      Effort: Pulmonary effort is normal. No respiratory distress.   Neurological:      Mental Status: She is alert and oriented to person, place, and time.   Psychiatric:         Behavior: Behavior normal.         Thought Content: Thought content normal.         Judgment: Judgment normal.       Data:  LFTs elevated, has not seen Hepatology yet      Didi Conteh MD  Internal Medicine      "

## 2022-11-23 ENCOUNTER — PATIENT MESSAGE (OUTPATIENT)
Dept: PSYCHIATRY | Facility: CLINIC | Age: 47
End: 2022-11-23
Payer: COMMERCIAL

## 2022-11-29 DIAGNOSIS — G47.00 INSOMNIA, UNSPECIFIED TYPE: ICD-10-CM

## 2022-11-30 ENCOUNTER — OFFICE VISIT (OUTPATIENT)
Dept: PODIATRY | Facility: CLINIC | Age: 47
End: 2022-11-30
Payer: COMMERCIAL

## 2022-11-30 DIAGNOSIS — M77.41 METATARSALGIA OF BOTH FEET: ICD-10-CM

## 2022-11-30 DIAGNOSIS — Q66.6 PES VALGUS: Primary | ICD-10-CM

## 2022-11-30 DIAGNOSIS — M77.42 METATARSALGIA OF BOTH FEET: ICD-10-CM

## 2022-11-30 PROCEDURE — 99213 OFFICE O/P EST LOW 20 MIN: CPT | Mod: S$GLB,,, | Performed by: PODIATRIST

## 2022-11-30 PROCEDURE — 99213 PR OFFICE/OUTPT VISIT, EST, LEVL III, 20-29 MIN: ICD-10-PCS | Mod: S$GLB,,, | Performed by: PODIATRIST

## 2022-11-30 PROCEDURE — 99999 PR PBB SHADOW E&M-EST. PATIENT-LVL III: ICD-10-PCS | Mod: PBBFAC,,, | Performed by: PODIATRIST

## 2022-11-30 PROCEDURE — 99999 PR PBB SHADOW E&M-EST. PATIENT-LVL III: CPT | Mod: PBBFAC,,, | Performed by: PODIATRIST

## 2022-11-30 RX ORDER — HYDROXYZINE HYDROCHLORIDE 25 MG/1
TABLET, FILM COATED ORAL
Qty: 90 TABLET | Refills: 1 | Status: SHIPPED | OUTPATIENT
Start: 2022-11-30 | End: 2022-12-29 | Stop reason: DRUGHIGH

## 2022-11-30 NOTE — PROGRESS NOTES
Subjective:      Patient ID: Ivory Cade is a 47 y.o. female.    Chief Complaint: Foot Pain (B/L)    Ivory is a 47 y.o. female who presents to the podiatry clinic  with complaint of  bilateral foot pain. Onset of the symptoms was several years ago. Precipitating event: none known. Current symptoms include: ability to bear weight, but with some pain and burning and tingling bottom of the feet mostly in the ball of the feet . Aggravating factors: any weight bearing and but can happen even when at rest . Symptoms have gradually worsened. Patient has had prior foot problems. Evaluation to date: none. Treatment to date: none. Patients rates pain 2/10 on pain scale.    9/2/22: patient presents for follow up bilateral foot pain mostly now in the ball of the feet with burning pains in the toes by evening. She relates a steroid pack in the past has helped a lot. Went to [employer] and got some of their inserts which helps some.     11/30/22: Patient returns for follow up bilateral foot pain. Relates improvement in the pain with the custom arch supports, however the numbness is getting worse and relates numbness in her hands as well taht is starting      Review of Systems   Constitutional: Negative for chills and fever.   Cardiovascular:  Negative for claudication and leg swelling.   Respiratory:  Negative for shortness of breath.    Skin:  Positive for nail changes. Negative for itching and rash.   Musculoskeletal:  Negative for muscle cramps, muscle weakness and myalgias.   Gastrointestinal:  Negative for nausea and vomiting.   Neurological:  Negative for focal weakness, loss of balance, numbness and paresthesias.         Objective:      Physical Exam  Constitutional:       General: She is not in acute distress.     Appearance: She is well-developed. She is not diaphoretic.   Cardiovascular:      Pulses:           Dorsalis pedis pulses are 2+ on the right side and 2+ on the left side.        Posterior tibial pulses  are 2+ on the right side and 2+ on the left side.      Comments: < 3 sec capillary refill time to toes 1-5 bilateral. Toes and feet are warm to touch proximally with normal distal cooling b/l. There is some hair growth on the feet and toes b/l. There is no edema b/l. No spider veins or varicosities present b/l.     Musculoskeletal:      Comments: Equinus noted b/l ankles with < 10 deg DF noted. MMT 5/5 in DF/PF/Inv/Ev resistance with no reproduction of pain in any direction. Passive range of motion of ankle and pedal joints is painless b/l.    Medial arch collapse bilateral with weight bearing  There is some tenderness to palpation at the plantar metatarsal heads, none to the interspaces with negative mulders click   Skin:     General: Skin is warm and dry.      Coloration: Skin is not pale.      Findings: No abrasion, bruising, burn, ecchymosis, erythema, laceration, lesion, petechiae or rash.      Nails: There is no clubbing.      Comments: Skin temperature, texture and turgor within normal limits.   Neurological:      Mental Status: She is alert and oriented to person, place, and time.      Sensory: No sensory deficit.      Motor: No tremor, atrophy or abnormal muscle tone.      Comments: Positive tinel sign bilateral.   Psychiatric:         Behavior: Behavior normal.             Assessment:       Encounter Diagnoses   Name Primary?    Pes valgus Yes    Metatarsalgia of both feet            Plan:       Ivory was seen today for foot pain.    Diagnoses and all orders for this visit:    Pes valgus    Metatarsalgia of both feet      I counseled the patient on her conditions, their implications and medical management.    Custom orthotic should be worn as much as possible    Avoid barefoot or flats    Started on gabapentin at night can go up to 600 mg nightly if needed    Follow up with PCP about numbness in hands and feet    Return PRN    Hugo Escobar DPM

## 2022-12-08 ENCOUNTER — PATIENT MESSAGE (OUTPATIENT)
Dept: FAMILY MEDICINE | Facility: CLINIC | Age: 47
End: 2022-12-08
Payer: COMMERCIAL

## 2022-12-08 DIAGNOSIS — F33.41 RECURRENT MAJOR DEPRESSIVE DISORDER, IN PARTIAL REMISSION: ICD-10-CM

## 2022-12-09 NOTE — TELEPHONE ENCOUNTER
LOV 11/09/22    Effexor-XR 37.5 mg is not giving pt side effects. Pt would like to know if she can increase dose. Appt?

## 2022-12-14 RX ORDER — VENLAFAXINE HYDROCHLORIDE 75 MG/1
75 CAPSULE, EXTENDED RELEASE ORAL DAILY
Qty: 30 CAPSULE | Refills: 5 | Status: SHIPPED | OUTPATIENT
Start: 2022-12-14 | End: 2022-12-29 | Stop reason: DRUGHIGH

## 2022-12-29 ENCOUNTER — OFFICE VISIT (OUTPATIENT)
Dept: PSYCHIATRY | Facility: CLINIC | Age: 47
End: 2022-12-29
Payer: COMMERCIAL

## 2022-12-29 VITALS
SYSTOLIC BLOOD PRESSURE: 146 MMHG | WEIGHT: 195.75 LBS | HEIGHT: 67 IN | BODY MASS INDEX: 30.72 KG/M2 | HEART RATE: 90 BPM | DIASTOLIC BLOOD PRESSURE: 101 MMHG

## 2022-12-29 DIAGNOSIS — Z15.89 MTHFR MUTATION: ICD-10-CM

## 2022-12-29 DIAGNOSIS — F33.0 MILD EPISODE OF RECURRENT MAJOR DEPRESSIVE DISORDER: Primary | ICD-10-CM

## 2022-12-29 DIAGNOSIS — E55.9 VITAMIN D DEFICIENCY: ICD-10-CM

## 2022-12-29 DIAGNOSIS — F41.1 GENERALIZED ANXIETY DISORDER WITH PANIC ATTACKS: ICD-10-CM

## 2022-12-29 DIAGNOSIS — F10.10 ALCOHOL ABUSE: ICD-10-CM

## 2022-12-29 DIAGNOSIS — G47.00 INSOMNIA, UNSPECIFIED TYPE: ICD-10-CM

## 2022-12-29 DIAGNOSIS — F41.0 GENERALIZED ANXIETY DISORDER WITH PANIC ATTACKS: ICD-10-CM

## 2022-12-29 PROBLEM — F40.298 SPECIFIC PHOBIA: Status: ACTIVE | Noted: 2022-12-29

## 2022-12-29 PROCEDURE — 90792 PR PSYCHIATRIC DIAGNOSTIC EVALUATION W/MEDICAL SERVICES: ICD-10-PCS | Mod: S$GLB,,, | Performed by: STUDENT IN AN ORGANIZED HEALTH CARE EDUCATION/TRAINING PROGRAM

## 2022-12-29 PROCEDURE — 99999 PR PBB SHADOW E&M-EST. PATIENT-LVL IV: ICD-10-PCS | Mod: PBBFAC,,, | Performed by: STUDENT IN AN ORGANIZED HEALTH CARE EDUCATION/TRAINING PROGRAM

## 2022-12-29 PROCEDURE — 90792 PSYCH DIAG EVAL W/MED SRVCS: CPT | Mod: S$GLB,,, | Performed by: STUDENT IN AN ORGANIZED HEALTH CARE EDUCATION/TRAINING PROGRAM

## 2022-12-29 PROCEDURE — 99999 PR PBB SHADOW E&M-EST. PATIENT-LVL IV: CPT | Mod: PBBFAC,,, | Performed by: STUDENT IN AN ORGANIZED HEALTH CARE EDUCATION/TRAINING PROGRAM

## 2022-12-29 RX ORDER — TRAZODONE HYDROCHLORIDE 50 MG/1
50 TABLET ORAL NIGHTLY
Qty: 30 TABLET | Refills: 1 | Status: SHIPPED | OUTPATIENT
Start: 2022-12-29 | End: 2023-02-16 | Stop reason: SDUPTHER

## 2022-12-29 RX ORDER — FLUOXETINE 10 MG/1
10 CAPSULE ORAL DAILY
Qty: 30 CAPSULE | Refills: 1 | Status: SHIPPED | OUTPATIENT
Start: 2022-12-29 | End: 2023-01-10 | Stop reason: DRUGHIGH
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
## 2022-12-29 NOTE — PATIENT INSTRUCTIONS
Discontinue WELLBUTRIN  Discontinue ATARAX/VISTARIL  Reduce EFFEXOR to 37.5mg everyday for 3 doses, then discontinue  Start PROZAC 10mg daily  Start TRAZODONE 50mg nightly  Please complete lab work at your earliest convenience. Your labs are NON-FASTING.  Cut down drinking or quit  Cut down on vaping or quit

## 2022-12-31 DIAGNOSIS — G47.00 INSOMNIA, UNSPECIFIED TYPE: ICD-10-CM

## 2023-01-03 RX ORDER — HYDROXYZINE HYDROCHLORIDE 25 MG/1
TABLET, FILM COATED ORAL
Qty: 90 TABLET | Refills: 1 | Status: SHIPPED | OUTPATIENT
Start: 2023-01-03 | End: 2023-01-05

## 2023-01-03 NOTE — TELEPHONE ENCOUNTER
LOV 11/9/22    NOV 1/18/23    Pt is not taking this medication and it is not on her medication list please advise!.    Princess Meagan VARGHESE

## 2023-01-05 ENCOUNTER — OFFICE VISIT (OUTPATIENT)
Dept: FAMILY MEDICINE | Facility: CLINIC | Age: 48
End: 2023-01-05
Payer: COMMERCIAL

## 2023-01-05 VITALS
HEART RATE: 84 BPM | BODY MASS INDEX: 30.9 KG/M2 | WEIGHT: 196.88 LBS | SYSTOLIC BLOOD PRESSURE: 112 MMHG | OXYGEN SATURATION: 96 % | DIASTOLIC BLOOD PRESSURE: 74 MMHG | HEIGHT: 67 IN | RESPIRATION RATE: 18 BRPM

## 2023-01-05 DIAGNOSIS — B34.9 VIRAL ILLNESS: Primary | ICD-10-CM

## 2023-01-05 PROCEDURE — 99213 OFFICE O/P EST LOW 20 MIN: CPT | Mod: S$GLB,,, | Performed by: PHYSICIAN ASSISTANT

## 2023-01-05 PROCEDURE — 99999 PR PBB SHADOW E&M-EST. PATIENT-LVL IV: CPT | Mod: PBBFAC,,, | Performed by: PHYSICIAN ASSISTANT

## 2023-01-05 PROCEDURE — 99213 PR OFFICE/OUTPT VISIT, EST, LEVL III, 20-29 MIN: ICD-10-PCS | Mod: S$GLB,,, | Performed by: PHYSICIAN ASSISTANT

## 2023-01-05 PROCEDURE — 99999 PR PBB SHADOW E&M-EST. PATIENT-LVL IV: ICD-10-PCS | Mod: PBBFAC,,, | Performed by: PHYSICIAN ASSISTANT

## 2023-01-05 NOTE — PROGRESS NOTES
Subjective:       Patient ID: Ivory Cade is a 47 y.o. female.    Chief Complaint: Cough, Fever, and Nasal Congestion (Sx yesterday)    HPI  Review of Systems   Constitutional:  Positive for activity change, chills and fatigue. Negative for appetite change, diaphoresis, fever and unexpected weight change.   HENT:  Positive for nasal congestion, postnasal drip, sinus pressure/congestion and voice change. Negative for sore throat.    Eyes: Negative.    Respiratory:  Positive for cough. Negative for shortness of breath and wheezing.    Cardiovascular: Negative.  Negative for chest pain and leg swelling.   Gastrointestinal: Negative.    Endocrine: Negative.    Genitourinary: Negative.    Musculoskeletal: Negative.    Integumentary:  Negative for rash. Negative.   Neurological: Negative.        Objective:      Physical Exam  Vitals reviewed.   Constitutional:       General: She is not in acute distress.     Appearance: Normal appearance. She is obese. She is not ill-appearing, toxic-appearing or diaphoretic.   HENT:      Head: Normocephalic and atraumatic.      Comments: Sinuses non tender     Right Ear: Tympanic membrane, ear canal and external ear normal. There is no impacted cerumen.      Left Ear: Tympanic membrane, ear canal and external ear normal. There is no impacted cerumen.      Nose: Congestion present.      Comments: Clear mucus     Mouth/Throat:      Mouth: Mucous membranes are moist.      Pharynx: Oropharynx is clear. No oropharyngeal exudate or posterior oropharyngeal erythema.   Eyes:      General: No scleral icterus.     Conjunctiva/sclera: Conjunctivae normal.   Neck:      Vascular: No carotid bruit.   Cardiovascular:      Rate and Rhythm: Normal rate and regular rhythm.      Pulses: Normal pulses.      Heart sounds: Normal heart sounds. No murmur heard.    No friction rub. No gallop.   Pulmonary:      Effort: No respiratory distress.      Breath sounds: No stridor. No wheezing, rhonchi or rales.    Musculoskeletal:         General: No swelling.      Cervical back: Normal range of motion and neck supple. No rigidity or tenderness.      Right lower leg: No edema.      Left lower leg: No edema.   Lymphadenopathy:      Cervical: No cervical adenopathy.   Skin:     General: Skin is warm and dry.      Findings: No rash.   Neurological:      Mental Status: She is alert.       Assessment:       Problem List Items Addressed This Visit    None  Visit Diagnoses       Viral illness    -  Primary    Relevant Orders    POCT COVID-19 Rapid Screening    POCT Influenza A/B Molecular              Plan:       Ivory was seen today for cough, fever and nasal congestion.    Diagnoses and all orders for this visit:    Viral illness  -     POCT COVID-19 Rapid Screening  -     POCT Influenza A/B Molecular     Covid neg  Flu neg    Discussed otc's  Hydrate  vaporizer

## 2023-01-08 PROBLEM — F41.0 GENERALIZED ANXIETY DISORDER WITH PANIC ATTACKS: Status: ACTIVE | Noted: 2022-12-29

## 2023-01-10 ENCOUNTER — OFFICE VISIT (OUTPATIENT)
Dept: PSYCHIATRY | Facility: CLINIC | Age: 48
End: 2023-01-10
Payer: COMMERCIAL

## 2023-01-10 VITALS
HEART RATE: 81 BPM | WEIGHT: 197.44 LBS | DIASTOLIC BLOOD PRESSURE: 95 MMHG | SYSTOLIC BLOOD PRESSURE: 152 MMHG | BODY MASS INDEX: 30.99 KG/M2 | HEIGHT: 67 IN

## 2023-01-10 DIAGNOSIS — F17.200 NICOTINE DEPENDENCE DUE TO VAPING NON-TOBACCO PRODUCT: ICD-10-CM

## 2023-01-10 DIAGNOSIS — F41.1 GENERALIZED ANXIETY DISORDER WITH PANIC ATTACKS: ICD-10-CM

## 2023-01-10 DIAGNOSIS — F33.0 MILD EPISODE OF RECURRENT MAJOR DEPRESSIVE DISORDER: Primary | ICD-10-CM

## 2023-01-10 DIAGNOSIS — F10.10 ALCOHOL ABUSE: ICD-10-CM

## 2023-01-10 DIAGNOSIS — F41.0 GENERALIZED ANXIETY DISORDER WITH PANIC ATTACKS: ICD-10-CM

## 2023-01-10 DIAGNOSIS — G47.00 INSOMNIA, UNSPECIFIED TYPE: ICD-10-CM

## 2023-01-10 PROBLEM — E55.9 VITAMIN D DEFICIENCY: Status: RESOLVED | Noted: 2022-12-29 | Resolved: 2023-01-10

## 2023-01-10 PROCEDURE — 99215 OFFICE O/P EST HI 40 MIN: CPT | Mod: 25,S$GLB,, | Performed by: STUDENT IN AN ORGANIZED HEALTH CARE EDUCATION/TRAINING PROGRAM

## 2023-01-10 PROCEDURE — 99406 BEHAV CHNG SMOKING 3-10 MIN: CPT | Mod: S$GLB,,, | Performed by: STUDENT IN AN ORGANIZED HEALTH CARE EDUCATION/TRAINING PROGRAM

## 2023-01-10 PROCEDURE — 96136 PSYCL/NRPSYC TST PHY/QHP 1ST: CPT | Mod: S$GLB,,, | Performed by: STUDENT IN AN ORGANIZED HEALTH CARE EDUCATION/TRAINING PROGRAM

## 2023-01-10 PROCEDURE — 96136 PR PSYCH/NEUROPSYCH TEST ADMIN/SCORING, 2+ TESTS, 1ST 30 MIN: ICD-10-PCS | Mod: S$GLB,,, | Performed by: STUDENT IN AN ORGANIZED HEALTH CARE EDUCATION/TRAINING PROGRAM

## 2023-01-10 PROCEDURE — 99215 PR OFFICE/OUTPT VISIT, EST, LEVL V, 40-54 MIN: ICD-10-PCS | Mod: 25,S$GLB,, | Performed by: STUDENT IN AN ORGANIZED HEALTH CARE EDUCATION/TRAINING PROGRAM

## 2023-01-10 PROCEDURE — 99406 PR TOBACCO USE CESSATION INTERMEDIATE 3-10 MINUTES: ICD-10-PCS | Mod: S$GLB,,, | Performed by: STUDENT IN AN ORGANIZED HEALTH CARE EDUCATION/TRAINING PROGRAM

## 2023-01-10 PROCEDURE — 99999 PR PBB SHADOW E&M-EST. PATIENT-LVL IV: ICD-10-PCS | Mod: PBBFAC,,, | Performed by: STUDENT IN AN ORGANIZED HEALTH CARE EDUCATION/TRAINING PROGRAM

## 2023-01-10 PROCEDURE — 99999 PR PBB SHADOW E&M-EST. PATIENT-LVL IV: CPT | Mod: PBBFAC,,, | Performed by: STUDENT IN AN ORGANIZED HEALTH CARE EDUCATION/TRAINING PROGRAM

## 2023-01-10 RX ORDER — MOXIFLOXACIN 5 MG/ML
SOLUTION/ DROPS OPHTHALMIC
COMMUNITY
Start: 2023-01-09 | End: 2023-02-16

## 2023-01-10 RX ORDER — FLUOXETINE HYDROCHLORIDE 20 MG/1
20 CAPSULE ORAL DAILY
Qty: 30 CAPSULE | Refills: 1 | Status: SHIPPED | OUTPATIENT
Start: 2023-01-10 | End: 2023-02-16 | Stop reason: DRUGHIGH

## 2023-01-10 RX ORDER — AZITHROMYCIN 250 MG/1
250 TABLET, FILM COATED ORAL
COMMUNITY
Start: 2023-01-09 | End: 2023-02-16

## 2023-01-10 NOTE — PROGRESS NOTES
Outpatient Psychiatry Followup Visit (DO/MD/NP)    1/10/2023    Ivory Cade, a 47 y.o. female, presenting for followup visit.     Medication management.. Met with patient, in person.     Assessment - Diagnosis - Goals:     Impression:      ICD-10-CM ICD-9-CM   1. Mild episode of recurrent major depressive disorder  F33.0 296.31   2. Generalized anxiety disorder with panic attacks  F41.1 300.02    F41.0 300.01   3. Alcohol abuse  F10.10 305.00   4. Nicotine dependence due to vaping non-tobacco product  F17.200 305.1   5. Insomnia, unspecified type  G47.00 780.52       Treatment Plan/Recommendations:   Chart reviewed.  Medication management: The risks and benefits of medication were discussed with the patient.  Psychotropic history includes  ATARAX/VISTARIL, CELEXA, CLONIDINE, DIPHENHYDRAMINE, EFFEXOR, ELAVIL, GABAPENTIN, PROZAC, REMERON, RESTORIL, TRAZODONE, WELLBUTRIN, and ZOLOFT .  Increase PROZAC  If well tolerated, plan to increase to 20mg next visit in 2-3w, then reassess again about a month or two later to determine whether a dose increase to 40mg would be necessary  BI is between 40 and 50, so will monitor for excessive activation.  Chosen for MDD (FDA approved), panic (FDA approved), long half life (reduced risk for SSRI discontinuation syndrome), low risk for weight gain, and possible weight loss.  Titration:  [id number]: Increase to 20mg daily  71SQH1812: Start 10mg daily  Prior to evaluation pt had been taking EFFEXOR and WELLBUTRIN  Adjust TRAZODONE  More appropriate to use long term than ATARAX  Titration:  10JAN2023: Adjust 50mg HS prn sleep  16AVR4150: Start 50mg HS  Prior to evaluation pt had been taking ATARAX  Considering adding NALTREXONE   Referral:  Referral for individual psychotherapy (PENDING)  Counseling, support and psychoeducation provided.  Motivational interviewing on vaping nicotine. Pre-contemplation stage.  Counseled on proper medication administration and adherence.   Pt was  "provided counseling on alcohol use. Pt was advised to maintain sobriety.  Discussed bipolarity risk (BI in 40-50 range) and provided educational material.  Discussed option to use NALTREXONE and provided educational material.  Monitor:  GAD7 and PHQ9  MDI  PSS10  Monitor vaping/nicotine use  Monitor alcohol use - Will consider starting a drinking diary at a future encounter.  Monitor sleep - Will consider starting a sleep diary at a future encounter.  Monitor vitals, especially weight and BP  Monitor labs (MTHFR pending, Vit D level sufficient)  Monitor mood instability - Will consider starting [facility]-Life-Chart Method mood charting at a future encounter.  The treatment plan and followup plan were reviewed with the patient  Pt understands to contact clinic if symptoms worsen, and to call 911 or go to nearest ER for suicidal ideation, intent or plan.    Return: 1 month, CLINIC ONLY (not virtual), for medication management only    Interval History:     Patient did not display signs of nor endorse symptoms of overt psychosis or acute mood disorder requiring hospitalization during the encounter. Patient denied violent thoughts or suicidal or homicidal ideation, intent, or plan.    Pt is pleasant and cooperative on interview. This visit was done in person in the clinic.    Recent URI. Reports "mentally better."    No longer ATARAX/VISTARIL, WELLBUTRIN or EFFEXOR. Has not been taking TRAZODONE because sleep has been more due to recent URI.    Continues to vape. Pre-contemplation stage.    Last drink was last night. Continues to drink at least 2-3 drinks per day.    Instruments:     GAD7 Interpretation: 8/21 (1/10/2023); MILD using 5-10-15 cutoffs. Compared to SEVERE 15/21 last time, GAD7 improved.    MDI Interpretation: 24/50 (1/10/2023); MILD DEPRESSION SEVERITY using 21-26-31 cutoffs. Compared to NEGATIVE 20/50 last time, MDI worsened.    PHQ9 Interpretation: 14/27 (1/10/2023); MILD using 7-15-21 cutoffs, but there tends " "to be significant variability in sensitivity of cutoff scores between 7 and 15. Compared to MILD 13/27 last time, PHQ9 is stable.    PSS10 Interpretation: 23/40 (1/10/2023); MODERATE using 14-27 cutoffs. (1/6 perceived helplessness) (1/4 self-efficacy deficits) Compared to HIGH 29/40 last time, PSS10 improved.     Review of Systems:     Mood and Associated Behaviors: Irritability improving.  Appetite: No concerns.  Anxiety: Anxiety is stable.  Stress: Stress is stable.  Sleep: No concerns. Sleep is better.    Functioning in Relationships:  Spouse/partner:   Peers: interpersonal deficits noted  Employers: no concerns identified    Current Evaluation:     Constitutional  Vitals:     Vitals:    01/10/23 1430   BP: (!) 152/95   Pulse: 81   Weight: 89.5 kg (197 lb 6.8 oz)   Height: 5' 7" (1.702 m)     General:  casual dress, obese (Class I, BMI 30.0 - 34.9)    Psychiatric / Mental Status Examination  Appearance: Dress is informal but appropriate.   Motor Activity: Normal.  Speech, Language Associations and Form of Thought: Clear, normal rate and volume. Associations intact. No tangentiality or thought blocking. Orderly thought process.   Mood and Affect: Somewhat anxious mood. Affect is appropriate.  Content of Thought: Normal, no suicidality, no homicidality, delusions, or paranoia   Perceptions: No hallucinations.  Orientation: Grossly intact.  Memory: Intact for content of interview.  Attention Span & Concentration: Able to focus for interview.  Fund of Knowledge: Intact and appropriate to age and level of education.  Insight: Intact.  Judgment: Behavior is adequate to circumstances.    Neurological / Musculoskeletal / Osteopathic Structural  Gait, Station:  non-ataxic    Auto-populated Chart Data:     Laboratory Data  Office Visit on 12/29/2022   Component Date Value Ref Range Status    Vitamin D, 1,25 (OH)2 01/05/2023 38  18 - 72 pg/mL Final    Vitamin D3, 1,25 (OH)2 01/05/2023 <8  pg/mL Final    Vitamin D2, " "1,25 (OH)2 01/05/2023 38  pg/mL Final       Medications  Outpatient Encounter Medications as of 1/10/2023   Medication Sig Dispense Refill    albuterol (PROVENTIL/VENTOLIN HFA) 90 mcg/actuation inhaler Inhale 2 puffs into the lungs every 6 (six) hours as needed for Wheezing. Rescue 18 g 11    azithromycin (Z-GABRIELA) 250 MG tablet Take 250 mg by mouth.      cloNIDine (CATAPRES) 0.1 MG tablet Take 0.1 mg by mouth as needed.      ergocalciferol (ERGOCALCIFEROL) 50,000 unit Cap Take 1 capsule (50,000 Units total) by mouth every 7 days. 12 capsule 3    hydroCHLOROthiazide (HYDRODIURIL) 25 MG tablet Take 1 tablet (25 mg total) by mouth once daily. 90 tablet 3    levothyroxine (SYNTHROID) 50 MCG tablet Take 1 tablet (50 mcg total) by mouth before breakfast. 90 tablet 3    meloxicam (MOBIC) 15 MG tablet TAKE 1 TABLET BY MOUTH EVERY DAY 30 tablet 0    moxifloxacin (VIGAMOX) 0.5 % ophthalmic solution Place into both eyes.      rosuvastatin (CRESTOR) 20 MG tablet Take 1 tablet (20 mg total) by mouth once daily. 90 tablet 3    traZODone (DESYREL) 50 MG tablet Take 1 tablet (50 mg total) by mouth every evening. 30 tablet 1    [DISCONTINUED] FLUoxetine 10 MG capsule Take 1 capsule (10 mg total) by mouth once daily. 30 capsule 1    FLUoxetine 20 MG capsule Take 1 capsule (20 mg total) by mouth once daily. 30 capsule 1    [DISCONTINUED] gabapentin (NEURONTIN) 300 MG capsule Take 1 capsule (300 mg total) by mouth every evening. 30 capsule 11    [DISCONTINUED] hydrOXYzine HCL (ATARAX) 25 MG tablet TAKE 1 TABLET BY MOUTH THREE TIMES A DAY AS NEEDED FOR ANXIETY 90 tablet 1     No facility-administered encounter medications on file as of 1/10/2023.       Billing Documentation:     Method of Encounter IN PERSON visit at the clinic   Type of Encounter Follow up visit with me   Counseling;  Psychotherapy Not applicable: Not done or UNDER 16 minutes   Counseling;  Tobacco and/or Nicotine 03826: 3 minutes (with payable diagnosis code "F17.200 - " "Nicotine dependence, unspecified, uncomplicated")   Additional Codes and Modifiers 25: tobacco/nicotine (with 32652 or 02396)  02230: administered and scored more than one psychological or neuropsychological tests (GAD7, MDI, PHQ9, and PSS-10) (16 mins)   Time Remaining Chart/Pt 53907: FOLLOW UP VISIT 54 minutes   Total Mins  (1/10/2023) 073        Bandar Sheridan DO  Department of Psychiatry    "

## 2023-01-10 NOTE — PATIENT INSTRUCTIONS
Try to drink 0-1 drinks per day  Increase PROZAC to 20mg daily  Restart TRAZODONE when needed for sleep (PRN)  Referral placed for psychotherapy

## 2023-01-12 LAB
1,25(OH)2D SERPL-MCNC: 38 PG/ML (ref 18–72)
1,25(OH)2D2 SERPL-MCNC: 38 PG/ML
1,25(OH)2D3 SERPL-MCNC: <8 PG/ML
MTHFR GENE MUT ANL BLD/T: NORMAL
MTHFR GENE MUT ANL BLD/T: POSITIVE

## 2023-01-20 ENCOUNTER — DOCUMENTATION ONLY (OUTPATIENT)
Dept: PSYCHIATRY | Facility: CLINIC | Age: 48
End: 2023-01-20
Payer: COMMERCIAL

## 2023-02-02 ENCOUNTER — OFFICE VISIT (OUTPATIENT)
Dept: PSYCHIATRY | Facility: CLINIC | Age: 48
End: 2023-02-02
Payer: COMMERCIAL

## 2023-02-02 VITALS
BODY MASS INDEX: 30.43 KG/M2 | HEART RATE: 75 BPM | SYSTOLIC BLOOD PRESSURE: 110 MMHG | DIASTOLIC BLOOD PRESSURE: 72 MMHG | WEIGHT: 193.88 LBS | HEIGHT: 67 IN

## 2023-02-02 DIAGNOSIS — G47.00 INSOMNIA, UNSPECIFIED TYPE: ICD-10-CM

## 2023-02-02 DIAGNOSIS — Z15.89 HETEROZYGOUS MTHFR MUTATION C677T: ICD-10-CM

## 2023-02-02 DIAGNOSIS — F33.0 MILD EPISODE OF RECURRENT MAJOR DEPRESSIVE DISORDER: Primary | ICD-10-CM

## 2023-02-02 DIAGNOSIS — F17.200 NICOTINE DEPENDENCE DUE TO VAPING NON-TOBACCO PRODUCT: ICD-10-CM

## 2023-02-02 DIAGNOSIS — F10.10 ALCOHOL ABUSE: ICD-10-CM

## 2023-02-02 DIAGNOSIS — F41.0 GENERALIZED ANXIETY DISORDER WITH PANIC ATTACKS: ICD-10-CM

## 2023-02-02 DIAGNOSIS — F41.1 GENERALIZED ANXIETY DISORDER WITH PANIC ATTACKS: ICD-10-CM

## 2023-02-02 PROCEDURE — 99214 PR OFFICE/OUTPT VISIT, EST, LEVL IV, 30-39 MIN: ICD-10-PCS | Mod: S$GLB,,, | Performed by: STUDENT IN AN ORGANIZED HEALTH CARE EDUCATION/TRAINING PROGRAM

## 2023-02-02 PROCEDURE — 99999 PR PBB SHADOW E&M-EST. PATIENT-LVL IV: ICD-10-PCS | Mod: PBBFAC,,, | Performed by: STUDENT IN AN ORGANIZED HEALTH CARE EDUCATION/TRAINING PROGRAM

## 2023-02-02 PROCEDURE — 99214 OFFICE O/P EST MOD 30 MIN: CPT | Mod: S$GLB,,, | Performed by: STUDENT IN AN ORGANIZED HEALTH CARE EDUCATION/TRAINING PROGRAM

## 2023-02-02 PROCEDURE — 99999 PR PBB SHADOW E&M-EST. PATIENT-LVL IV: CPT | Mod: PBBFAC,,, | Performed by: STUDENT IN AN ORGANIZED HEALTH CARE EDUCATION/TRAINING PROGRAM

## 2023-02-02 RX ORDER — NALTREXONE HYDROCHLORIDE 50 MG/1
50 TABLET, FILM COATED ORAL DAILY
Qty: 30 TABLET | Refills: 0 | Status: SHIPPED | OUTPATIENT
Start: 2023-02-02 | End: 2023-02-16 | Stop reason: SDUPTHER
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
## 2023-02-16 ENCOUNTER — OFFICE VISIT (OUTPATIENT)
Dept: PSYCHIATRY | Facility: CLINIC | Age: 48
End: 2023-02-16
Payer: COMMERCIAL

## 2023-02-16 VITALS
BODY MASS INDEX: 30.79 KG/M2 | SYSTOLIC BLOOD PRESSURE: 129 MMHG | HEIGHT: 67 IN | HEART RATE: 73 BPM | DIASTOLIC BLOOD PRESSURE: 81 MMHG | WEIGHT: 196.19 LBS

## 2023-02-16 DIAGNOSIS — F10.10 ALCOHOL ABUSE: ICD-10-CM

## 2023-02-16 DIAGNOSIS — G47.00 INSOMNIA, UNSPECIFIED TYPE: ICD-10-CM

## 2023-02-16 DIAGNOSIS — F41.0 GENERALIZED ANXIETY DISORDER WITH PANIC ATTACKS: ICD-10-CM

## 2023-02-16 DIAGNOSIS — F17.200 NICOTINE DEPENDENCE DUE TO VAPING NON-TOBACCO PRODUCT: ICD-10-CM

## 2023-02-16 DIAGNOSIS — F33.0 MILD EPISODE OF RECURRENT MAJOR DEPRESSIVE DISORDER: Primary | ICD-10-CM

## 2023-02-16 DIAGNOSIS — F41.1 GENERALIZED ANXIETY DISORDER WITH PANIC ATTACKS: ICD-10-CM

## 2023-02-16 DIAGNOSIS — Z15.89 HETEROZYGOUS MTHFR MUTATION C677T: ICD-10-CM

## 2023-02-16 PROCEDURE — 99214 OFFICE O/P EST MOD 30 MIN: CPT | Mod: S$GLB,,, | Performed by: STUDENT IN AN ORGANIZED HEALTH CARE EDUCATION/TRAINING PROGRAM

## 2023-02-16 PROCEDURE — 99999 PR PBB SHADOW E&M-EST. PATIENT-LVL III: ICD-10-PCS | Mod: PBBFAC,,, | Performed by: STUDENT IN AN ORGANIZED HEALTH CARE EDUCATION/TRAINING PROGRAM

## 2023-02-16 PROCEDURE — 99999 PR PBB SHADOW E&M-EST. PATIENT-LVL III: CPT | Mod: PBBFAC,,, | Performed by: STUDENT IN AN ORGANIZED HEALTH CARE EDUCATION/TRAINING PROGRAM

## 2023-02-16 PROCEDURE — 99214 PR OFFICE/OUTPT VISIT, EST, LEVL IV, 30-39 MIN: ICD-10-PCS | Mod: S$GLB,,, | Performed by: STUDENT IN AN ORGANIZED HEALTH CARE EDUCATION/TRAINING PROGRAM

## 2023-02-16 RX ORDER — NALTREXONE HYDROCHLORIDE 50 MG/1
50 TABLET, FILM COATED ORAL DAILY
Qty: 30 TABLET | Refills: 2 | Status: SHIPPED | OUTPATIENT
Start: 2023-02-16 | End: 2023-05-03 | Stop reason: ALTCHOICE

## 2023-02-16 RX ORDER — FLUOXETINE HYDROCHLORIDE 40 MG/1
40 CAPSULE ORAL DAILY
Qty: 30 CAPSULE | Refills: 0 | Status: SHIPPED | OUTPATIENT
Start: 2023-02-16 | End: 2023-03-07 | Stop reason: SDUPTHER

## 2023-02-16 RX ORDER — TRAZODONE HYDROCHLORIDE 50 MG/1
50 TABLET ORAL NIGHTLY
Qty: 30 TABLET | Refills: 1 | Status: SHIPPED | OUTPATIENT
Start: 2023-02-16 | End: 2023-04-04 | Stop reason: DRUGHIGH

## 2023-02-16 NOTE — PATIENT INSTRUCTIONS
"Continue to avoid alcohol  Continue NALTREXONE daily  Continue TRAZODONE nightly  Increase PROZAC to 40mg daily  Start L-METHYLFOLATE    There is an over-the-counter version of L-Methylfolate called "OPTIFOLATE L-METHYLFOLATE 7.5mg," which costs between $16 and $32. You can get it on [employer], and I think [employer] may carry it as well. There are other alternatives, but this one seems to be the most affordable on the market. If you find a better alternative, just make sure that it is L-METHYLFOLATE, not just "folate" or "folic acid," and that the dose is in "mg" not "mcg" (because 1mcg is only a thousandth the amount of a mg). Also let me know so I can spread the news to other patients.    Keep appointments with therapist (MEMO Storm, [facility]): First one is March 13 at 9am    Don't go to bed unless you're drowsy.  If you're awake in bed for longer than 30mins, leave the bedroom, and don't return until you're drowsy.  Set an alarm every morning, including on weekends, and don't snooze.    Limit nap duration to 30 mins or less.    "

## 2023-02-16 NOTE — PROGRESS NOTES
Outpatient Psychiatry Followup Visit (DO/MD/NP)    2/16/2023    Ivory Cade, a 47 y.o. female, presenting for followup visit.     Reason for Encounter: Medication management. Met with patient, in person.     Assessment - Diagnosis - Goals:     Diagnostic Impression     ICD-10-CM ICD-9-CM   1. Mild episode of recurrent major depressive disorder  F33.0 296.31   2. Generalized anxiety disorder with panic attacks  F41.1 300.02    F41.0 300.01   3. Alcohol abuse  F10.10 305.00   4. Nicotine dependence due to vaping non-tobacco product  F17.200 305.1   5. Insomnia, unspecified type  G47.00 780.52   6. Heterozygous MTHFR mutation C677T  Z15.89 V84.89   7. BMI 30.0-30.9,adult  Z68.30 V85.30      Progress See most recent case formulation. Forward progress.     Medication Management   Chart was reviewed. The risks and benefits of medication were discussed with pt. The treatment plan and followup plan were reviewed with pt. Pt understands to contact clinic if symptoms worsen. Pt understand to call 911 or go to nearest ER for suicidal ideation, intent or plan.   RX History Psychotropic history includes   ATARAX/VISTARIL, CELEXA, CLONIDINE, DIPHENHYDRAMINE, EFFEXOR, ELAVIL, GABAPENTIN, NALTREXONE, PROZAC, REMERON, RESTORIL, TRAZODONE, WELLBUTRIN, and ZOLOFT   Current RX Increase PROZAC  No excessive activation [id number]  Titration:  16FEB2023: Increase to 40mg daily  10JAN2023: Increase to 20mg daily  [id number]: Start 10mg daily  Prior to evaluation pt had been taking EFFEXOR and WELLBUTRIN  Continue TRAZODONE  More appropriate to use long term than ATARAX  Titration:  10JAN2023: Adjust 50mg HS prn sleep  53NRU4359: Start 50mg HS  Prior to evaluation pt had been taking ATARAX  Continue NALTREXONE   Sober since 28JAN2023  No opioid use  Medication adjustments:  [id number]: Start 50mg daily  Start OTC L-METHYLFOLATE  C677T C/T  Titration:  02FEB2023: Start 7.5mg daily   Education & Counseling RX administration and  adherence  Sleep Hygiene: wake alarm, nap duration, drowsiness   Other Orders PENDING from past encounter   Monitor GAD7, PHQ9, PSS4  MDI  Monitor vitals, especially weight and BP   Return: 3 weeks, CLINIC ONLY (not virtual), for medication management only     Interval History:     Patient did not display signs of nor endorse symptoms of overt psychosis or acute mood disorder requiring hospitalization during the encounter. Patient denied violent thoughts or suicidal or homicidal ideation, intent, or plan.    Pt is pleasant and cooperative on interview. This visit was done in person in the clinic.    Started NALTREXONE 4 days after last drink.     TRAZODONE has been helping.    Feeling overwhelmed.     Instruments:     Routine Instruments   GAD7 Interpretation: 13/21 (2/16/2023); MODERATE using 5-10-15 cutoffs. Compared to MODERATE 11/21  last time, GAD7 is stable.   PHQ9 Interpretation: 14/27 (2/16/2023); MILD using 7-15-21 cutoffs, but there tends to be significant variability in sensitivity of cutoff scores between 7 and 15. Compared to MILD 11/27 last time, PHQ9 is stable.   PSS4 Interpretation: 13/16 (2/16/2023); HIGH using 6-11 cutoffs. 2/2 PH, 2/2 LSE. Compared to MODERATE 10/16 last time, PSS4 WORSENED.   Additional Instruments   MDI Interpretation: 34/50 (2/16/2023); SEVERE DEPRESSION SEVERITY using 21-26-31 cutoffs. Compared to MILD DEPRESSION SEVERITY 24/50 last time, MDI WORSENED.      Review of Systems:     Personal Functioning   Sleep Reporting fair overall but NOT restorative. Sleep onset is usually within 30 minutes. Duration is 6-8 hours with 1 or 2 interruptions.   Emotion Crying spells. Depression persists. Irritability persists.   Appetite No concerns.   Stress Stress has worsened.   Anxiety Anxiety has worsened.   Interpersonal Functioning   Home about the same   Peers interpersonal deficits noted   Work tumultuous     Current Evaluation:     Constitutional  Vitals:     Vitals:    02/16/23 1413  "  BP: 129/81   Pulse: 73   Weight: 89 kg (196 lb 3.4 oz)   Height: 5' 7" (1.702 m)     General:  casual dress, obese (Class I, BMI 30.0 - 34.9)    Psychiatric / Mental Status Examination  1. Appearance: Dress is informal but appropriate. Motor activity normal.  2. Discourse: Clear speech with normal rate and volume. Associations intact. Orderly and without tangentiality.  3. Emotional Expression: Anxious and depressed mood. Tearful.  4. Perception and Thinking: No hallucinations. No suicidality, no homicidality, delusions, or paranoia.  5. Sensorium: Grossly intact. Able to focus for interview.  6. Memory and Fund of Knowledge: Intact for content of interview.  7. Insight and Judgment: Intact.    Neurological / Musculoskeletal / Osteopathic Structural  Gait, Station:  non-ataxic     Auto-populated chart data omitted from this note for brevity.    Billing Documentation:     Method of Encounter IN PERSON visit at the clinic   Type of Encounter Follow up visit with me   Counseling;  Psychotherapy Not applicable: Not done or UNDER 16 minutes   Counseling;  Tobacco and/or Nicotine Not applicable: Not done or UNDER 3 minutes   Additional Codes and Modifiers None   Time Remaining Chart/Pt 81077: FOLLOW UP VISIT 30 minutes   Total Mins  (2/16/2023) 030        Bandar Sheridan DO  Department of Psychiatry    "

## 2023-03-02 ENCOUNTER — LAB VISIT (OUTPATIENT)
Dept: LAB | Facility: HOSPITAL | Age: 48
End: 2023-03-02
Attending: INTERNAL MEDICINE
Payer: COMMERCIAL

## 2023-03-02 ENCOUNTER — OFFICE VISIT (OUTPATIENT)
Dept: FAMILY MEDICINE | Facility: CLINIC | Age: 48
End: 2023-03-02
Payer: COMMERCIAL

## 2023-03-02 VITALS
BODY MASS INDEX: 30.22 KG/M2 | WEIGHT: 192.56 LBS | HEIGHT: 67 IN | SYSTOLIC BLOOD PRESSURE: 98 MMHG | HEART RATE: 64 BPM | DIASTOLIC BLOOD PRESSURE: 66 MMHG

## 2023-03-02 DIAGNOSIS — B00.1 RECURRENT COLD SORES: ICD-10-CM

## 2023-03-02 DIAGNOSIS — G62.9 PERIPHERAL POLYNEUROPATHY: ICD-10-CM

## 2023-03-02 DIAGNOSIS — E78.5 DYSLIPIDEMIA: ICD-10-CM

## 2023-03-02 DIAGNOSIS — R93.5 ABNORMAL US (ULTRASOUND) OF ABDOMEN: ICD-10-CM

## 2023-03-02 DIAGNOSIS — I10 ESSENTIAL HYPERTENSION: ICD-10-CM

## 2023-03-02 DIAGNOSIS — E03.9 HYPOTHYROIDISM, UNSPECIFIED TYPE: ICD-10-CM

## 2023-03-02 DIAGNOSIS — K75.81 STEATOHEPATITIS: Primary | ICD-10-CM

## 2023-03-02 DIAGNOSIS — R16.0 HEPATOMEGALY: ICD-10-CM

## 2023-03-02 DIAGNOSIS — K75.81 STEATOHEPATITIS: ICD-10-CM

## 2023-03-02 LAB
BASOPHILS # BLD AUTO: 0.05 K/UL (ref 0–0.2)
BASOPHILS NFR BLD: 0.8 % (ref 0–1.9)
DIFFERENTIAL METHOD: NORMAL
EOSINOPHIL # BLD AUTO: 0.2 K/UL (ref 0–0.5)
EOSINOPHIL NFR BLD: 3.2 % (ref 0–8)
ERYTHROCYTE [DISTWIDTH] IN BLOOD BY AUTOMATED COUNT: 12.5 % (ref 11.5–14.5)
HCT VFR BLD AUTO: 40.7 % (ref 37–48.5)
HGB BLD-MCNC: 13.1 G/DL (ref 12–16)
IMM GRANULOCYTES # BLD AUTO: 0.01 K/UL (ref 0–0.04)
IMM GRANULOCYTES NFR BLD AUTO: 0.2 % (ref 0–0.5)
LYMPHOCYTES # BLD AUTO: 1.5 K/UL (ref 1–4.8)
LYMPHOCYTES NFR BLD: 22.8 % (ref 18–48)
MCH RBC QN AUTO: 30.7 PG (ref 27–31)
MCHC RBC AUTO-ENTMCNC: 32.2 G/DL (ref 32–36)
MCV RBC AUTO: 95 FL (ref 82–98)
MONOCYTES # BLD AUTO: 0.5 K/UL (ref 0.3–1)
MONOCYTES NFR BLD: 7.3 % (ref 4–15)
NEUTROPHILS # BLD AUTO: 4.4 K/UL (ref 1.8–7.7)
NEUTROPHILS NFR BLD: 65.7 % (ref 38–73)
NRBC BLD-RTO: 0 /100 WBC
PLATELET # BLD AUTO: 201 K/UL (ref 150–450)
PMV BLD AUTO: 11.7 FL (ref 9.2–12.9)
RBC # BLD AUTO: 4.27 M/UL (ref 4–5.4)
WBC # BLD AUTO: 6.62 K/UL (ref 3.9–12.7)

## 2023-03-02 PROCEDURE — 99999 PR PBB SHADOW E&M-EST. PATIENT-LVL III: CPT | Mod: PBBFAC,,, | Performed by: INTERNAL MEDICINE

## 2023-03-02 PROCEDURE — 80053 COMPREHEN METABOLIC PANEL: CPT | Performed by: INTERNAL MEDICINE

## 2023-03-02 PROCEDURE — 99214 OFFICE O/P EST MOD 30 MIN: CPT | Mod: S$GLB,,, | Performed by: INTERNAL MEDICINE

## 2023-03-02 PROCEDURE — 84443 ASSAY THYROID STIM HORMONE: CPT | Performed by: INTERNAL MEDICINE

## 2023-03-02 PROCEDURE — 82607 VITAMIN B-12: CPT | Performed by: INTERNAL MEDICINE

## 2023-03-02 PROCEDURE — 80061 LIPID PANEL: CPT | Performed by: INTERNAL MEDICINE

## 2023-03-02 PROCEDURE — 83036 HEMOGLOBIN GLYCOSYLATED A1C: CPT | Performed by: INTERNAL MEDICINE

## 2023-03-02 PROCEDURE — 85025 COMPLETE CBC W/AUTO DIFF WBC: CPT | Performed by: INTERNAL MEDICINE

## 2023-03-02 PROCEDURE — 99214 PR OFFICE/OUTPT VISIT, EST, LEVL IV, 30-39 MIN: ICD-10-PCS | Mod: S$GLB,,, | Performed by: INTERNAL MEDICINE

## 2023-03-02 PROCEDURE — 99999 PR PBB SHADOW E&M-EST. PATIENT-LVL III: ICD-10-PCS | Mod: PBBFAC,,, | Performed by: INTERNAL MEDICINE

## 2023-03-02 PROCEDURE — 36415 COLL VENOUS BLD VENIPUNCTURE: CPT | Mod: PN | Performed by: INTERNAL MEDICINE

## 2023-03-02 RX ORDER — HYDROCHLOROTHIAZIDE 25 MG/1
12.5 TABLET ORAL DAILY
Qty: 90 TABLET | Refills: 3
Start: 2023-03-02 | End: 2023-05-02

## 2023-03-02 RX ORDER — VALACYCLOVIR HYDROCHLORIDE 1 G/1
2000 TABLET, FILM COATED ORAL 2 TIMES DAILY
Qty: 8 TABLET | Refills: 5 | Status: SHIPPED | OUTPATIENT
Start: 2023-03-02 | End: 2024-02-02

## 2023-03-02 NOTE — PROGRESS NOTES
Assessment and Plan:    1. Steatohepatitis  Need to recheck labs and imaging. Discussed recommendation to see hepatology, which she will consider after labs are back.  - CBC Auto Differential; Future  - Hemoglobin A1C; Future  - MRI Abdomen W WO Contrast; Future    2. Hepatomegaly  - CBC Auto Differential; Future  - Hemoglobin A1C; Future  - MRI Abdomen W WO Contrast; Future    3. Abnormal US (ultrasound) of abdomen  - MRI Abdomen W WO Contrast; Future    4. Hypothyroidism, unspecified type  Has been taking levothyroxine correctly.   - TSH; Future    5. Peripheral polyneuropathy  Some numb sensation and tingling in hands. Discussed possible causes. Alcoholic neuropathy is high on my differential given the overall picture. Will check labs. Follow up in 3 months.  - CBC Auto Differential; Future  - TSH; Future  - Vitamin B12; Future    6. Dyslipidemia  Recheck labs, has been taking the rosuvastatin for at least the last 6 weeks, though had been off of it before that for a time.  - Comprehensive Metabolic Panel; Future  - Lipid Panel; Future    7. Essential hypertension  BP has been low occasionally. Will decreased HCTZ to 12.5 mg daily and follow BP closely.  - Comprehensive Metabolic Panel; Future  - Lipid Panel; Future  - hydroCHLOROthiazide (HYDRODIURIL) 25 MG tablet; Take 0.5 tablets (12.5 mg total) by mouth once daily.  Dispense: 90 tablet; Refill: 3    8. Recurrent cold sores  - valACYclovir (VALTREX) 1000 MG tablet; Take 2 tablets (2,000 mg total) by mouth 2 (two) times daily. for 1 day  Dispense: 8 tablet; Refill: 5    ______________________________________________________________________  Subjective:    Chief Complaint:  Follow up chronic medical conditions.    HPI:  Ivory is a 47 y.o. year old female here to follow up chronic medical conditions.     She has recently started seeing psychiatry for depression, anxiety, insomnia, and alcohol dependence. She is currently taking fluoxetine 40, naltrexone 50, and  trazodone 50. She had been drinking more heavily until about 3 weeks ago.    Steatohepatitis- Last seen about this in Sept. At the time had been drinking a lot more and had gained some weight. We had discussed seeing hepatology, but she preferred to recheck and see if this had improved. As above, had been drinking (about 6+ drinks per day).     HLD- Last year LDL and TGs significantly elevated. Had been off of cholesterol medications at that time and had been doing keto. She reports that she had been off of the cholesterol medication for a time, but has been back on this for several weeks.     Hypothyroidism- Taking levothyroxine 50 mcg daily    Vitamin D def- Taking ergocalciferol 50K weekly.    HTN- Taking HCTZ 25 mg daily.    Medications:  Current Outpatient Medications on File Prior to Visit   Medication Sig Dispense Refill    albuterol (PROVENTIL/VENTOLIN HFA) 90 mcg/actuation inhaler Inhale 2 puffs into the lungs every 6 (six) hours as needed for Wheezing. Rescue 18 g 11    ergocalciferol (ERGOCALCIFEROL) 50,000 unit Cap Take 1 capsule (50,000 Units total) by mouth every 7 days. 12 capsule 3    FLUoxetine 40 MG capsule Take 1 capsule (40 mg total) by mouth once daily. 30 capsule 0    hydroCHLOROthiazide (HYDRODIURIL) 25 MG tablet Take 1 tablet (25 mg total) by mouth once daily. 90 tablet 3    levothyroxine (SYNTHROID) 50 MCG tablet Take 1 tablet (50 mcg total) by mouth before breakfast. 90 tablet 3    meloxicam (MOBIC) 15 MG tablet TAKE 1 TABLET BY MOUTH EVERY DAY 30 tablet 0    naltrexone (DEPADE) 50 mg tablet Take 50 mg by mouth once daily. 30 tablet 2    rosuvastatin (CRESTOR) 20 MG tablet Take 1 tablet (20 mg total) by mouth once daily. 90 tablet 3    traZODone (DESYREL) 50 MG tablet Take 1 tablet (50 mg total) by mouth every evening. 30 tablet 1     No current facility-administered medications on file prior to visit.       Review of Systems:  Review of Systems   Constitutional:  Negative for chills, fever  "and unexpected weight change.   Respiratory:  Negative for shortness of breath and wheezing.    Cardiovascular:  Negative for chest pain and leg swelling.   Gastrointestinal:  Negative for diarrhea, nausea and vomiting.   Neurological:  Negative for dizziness, syncope and light-headedness.     Past Medical History:  Past Medical History:   Diagnosis Date    Abdominal mass, left lower quadrant 11/2016    Anxiety disorder 2010    Asthma     Bilateral ovarian cysts     Cancer antigen 125 () elevation 11/2016    High cholesterol     History of hemorrhoids     Incisional abscess     right breast I/D abscess 2012    MTHFR mutation     Thrombocytopenia complicating pregnancy 2010 and 2012       Objective:    Vitals:  Vitals:    03/02/23 1125   BP: 98/66   Pulse: 64   Weight: 87.4 kg (192 lb 9.2 oz)   Height: 5' 7" (1.702 m)   PainSc: 0-No pain       Physical Exam  Vitals reviewed.   Constitutional:       General: She is not in acute distress.     Appearance: She is well-developed.   Eyes:      General:         Right eye: No discharge.         Left eye: No discharge.      Conjunctiva/sclera: Conjunctivae normal.   Cardiovascular:      Rate and Rhythm: Normal rate and regular rhythm.   Pulmonary:      Effort: Pulmonary effort is normal. No respiratory distress.   Skin:     General: Skin is warm and dry.   Neurological:      Mental Status: She is alert and oriented to person, place, and time.   Psychiatric:         Behavior: Behavior normal.         Thought Content: Thought content normal.         Judgment: Judgment normal.       Data:  Vitamin D recently in normal range.      Didi Conteh MD  Internal Medicine    "

## 2023-03-03 LAB
ALBUMIN SERPL BCP-MCNC: 4.1 G/DL (ref 3.5–5.2)
ALP SERPL-CCNC: 60 U/L (ref 55–135)
ALT SERPL W/O P-5'-P-CCNC: 14 U/L (ref 10–44)
ANION GAP SERPL CALC-SCNC: 8 MMOL/L (ref 8–16)
AST SERPL-CCNC: 16 U/L (ref 10–40)
BILIRUB SERPL-MCNC: 0.6 MG/DL (ref 0.1–1)
BUN SERPL-MCNC: 11 MG/DL (ref 6–20)
CALCIUM SERPL-MCNC: 9.8 MG/DL (ref 8.7–10.5)
CHLORIDE SERPL-SCNC: 101 MMOL/L (ref 95–110)
CHOLEST SERPL-MCNC: 149 MG/DL (ref 120–199)
CHOLEST/HDLC SERPL: 3.3 {RATIO} (ref 2–5)
CO2 SERPL-SCNC: 29 MMOL/L (ref 23–29)
CREAT SERPL-MCNC: 0.8 MG/DL (ref 0.5–1.4)
EST. GFR  (NO RACE VARIABLE): >60 ML/MIN/1.73 M^2
ESTIMATED AVG GLUCOSE: 108 MG/DL (ref 68–131)
GLUCOSE SERPL-MCNC: 90 MG/DL (ref 70–110)
HBA1C MFR BLD: 5.4 % (ref 4–5.6)
HDLC SERPL-MCNC: 45 MG/DL (ref 40–75)
HDLC SERPL: 30.2 % (ref 20–50)
LDLC SERPL CALC-MCNC: 90.4 MG/DL (ref 63–159)
NONHDLC SERPL-MCNC: 104 MG/DL
POTASSIUM SERPL-SCNC: 3.6 MMOL/L (ref 3.5–5.1)
PROT SERPL-MCNC: 7.1 G/DL (ref 6–8.4)
SODIUM SERPL-SCNC: 138 MMOL/L (ref 136–145)
TRIGL SERPL-MCNC: 68 MG/DL (ref 30–150)
TSH SERPL DL<=0.005 MIU/L-ACNC: 0.4 UIU/ML (ref 0.4–4)
VIT B12 SERPL-MCNC: 689 PG/ML (ref 210–950)

## 2023-03-04 ENCOUNTER — PATIENT MESSAGE (OUTPATIENT)
Dept: FAMILY MEDICINE | Facility: CLINIC | Age: 48
End: 2023-03-04
Payer: COMMERCIAL

## 2023-03-07 ENCOUNTER — OFFICE VISIT (OUTPATIENT)
Dept: PSYCHIATRY | Facility: CLINIC | Age: 48
End: 2023-03-07
Payer: COMMERCIAL

## 2023-03-07 VITALS
HEIGHT: 67 IN | WEIGHT: 195.13 LBS | BODY MASS INDEX: 30.63 KG/M2 | SYSTOLIC BLOOD PRESSURE: 118 MMHG | DIASTOLIC BLOOD PRESSURE: 78 MMHG | HEART RATE: 56 BPM

## 2023-03-07 DIAGNOSIS — F17.200 NICOTINE DEPENDENCE DUE TO VAPING NON-TOBACCO PRODUCT: ICD-10-CM

## 2023-03-07 DIAGNOSIS — F41.0 GENERALIZED ANXIETY DISORDER WITH PANIC ATTACKS: ICD-10-CM

## 2023-03-07 DIAGNOSIS — Z15.89 HETEROZYGOUS MTHFR MUTATION C677T: ICD-10-CM

## 2023-03-07 DIAGNOSIS — F10.10 ALCOHOL ABUSE: ICD-10-CM

## 2023-03-07 DIAGNOSIS — G47.00 INSOMNIA, UNSPECIFIED TYPE: ICD-10-CM

## 2023-03-07 DIAGNOSIS — F33.0 MILD EPISODE OF RECURRENT MAJOR DEPRESSIVE DISORDER: Primary | ICD-10-CM

## 2023-03-07 DIAGNOSIS — F41.1 GENERALIZED ANXIETY DISORDER WITH PANIC ATTACKS: ICD-10-CM

## 2023-03-07 PROCEDURE — 99999 PR PBB SHADOW E&M-EST. PATIENT-LVL III: ICD-10-PCS | Mod: PBBFAC,,, | Performed by: STUDENT IN AN ORGANIZED HEALTH CARE EDUCATION/TRAINING PROGRAM

## 2023-03-07 PROCEDURE — 99214 PR OFFICE/OUTPT VISIT, EST, LEVL IV, 30-39 MIN: ICD-10-PCS | Mod: S$GLB,,, | Performed by: STUDENT IN AN ORGANIZED HEALTH CARE EDUCATION/TRAINING PROGRAM

## 2023-03-07 PROCEDURE — 99999 PR PBB SHADOW E&M-EST. PATIENT-LVL III: CPT | Mod: PBBFAC,,, | Performed by: STUDENT IN AN ORGANIZED HEALTH CARE EDUCATION/TRAINING PROGRAM

## 2023-03-07 PROCEDURE — 99214 OFFICE O/P EST MOD 30 MIN: CPT | Mod: S$GLB,,, | Performed by: STUDENT IN AN ORGANIZED HEALTH CARE EDUCATION/TRAINING PROGRAM

## 2023-03-07 RX ORDER — FLUOXETINE HYDROCHLORIDE 40 MG/1
40 CAPSULE ORAL DAILY
Qty: 30 CAPSULE | Refills: 1 | Status: SHIPPED | OUTPATIENT
Start: 2023-03-07 | End: 2023-04-04 | Stop reason: SDUPTHER

## 2023-03-07 NOTE — PROGRESS NOTES
Outpatient Psychiatry Followup Visit (DO/MD/NP)    3/7/2023    Ivory Cade, a 47 y.o. female, presenting for followup visit.     Reason for Encounter: Medication management. Met with patient, in person.     Assessment - Diagnosis - Goals:     Diagnostic Impression     ICD-10-CM ICD-9-CM   1. Mild episode of recurrent major depressive disorder  F33.0 296.31   2. Generalized anxiety disorder with panic attacks  F41.1 300.02    F41.0 300.01   3. Alcohol abuse  F10.10 305.00   4. Nicotine dependence due to vaping non-tobacco product  F17.200 305.1   5. Insomnia, unspecified type  G47.00 780.52   6. Heterozygous MTHFR mutation C677T  Z15.89 V84.89   7. BMI 30.0-30.9,adult  Z68.30 V85.30      Progress See most recent case formulation. Forward progress.     Medication Management   Chart was reviewed. The risks and benefits of medication were discussed with pt. The treatment plan and followup plan were reviewed with pt. Pt understands to contact clinic if symptoms worsen. Pt understand to call 911 or go to nearest ER for suicidal ideation, intent or plan.   RX History Psychotropic history includes  ATARAX/VISTARIL, CELEXA, CLONIDINE, DIPHENHYDRAMINE, EFFEXOR, ELAVIL, GABAPENTIN, NALTREXONE, PROZAC, REMERON, RESTORIL, TRAZODONE, WELLBUTRIN, and ZOLOFT   Current RX Continue PROZAC  No excessive activation [id number], 07MAR2023  Titration:  [id number]: Increase to 40mg daily  10JAN2023: Increase to 20mg daily  [id number]: Start 10mg daily  Prior to evaluation pt had been taking EFFEXOR and WELLBUTRIN  Continue TRAZODONE  More appropriate to use long term than ATARAX  Titration:  10JAN2023: Adjust 50mg HS prn sleep  36JBR1219: Start 50mg HS  Prior to evaluation pt had been taking ATARAX  Continue NALTREXONE   Sober since 28JAN2023  No opioid use  Medication adjustments:  [id number]: Start 50mg daily  Continue OTC L-METHYLFOLATE  C677T C/T  Titration:  02FEB2023: Start 7.5mg daily   Education & Counseling RX administration and  adherence   Other Orders PENDING from past encounter   Monitor Instruments: PROMIS (A&D), PSS4  Use of: alcohol  VITAL SIGNS   Return: 1 month, CLINIC ONLY (not virtual), for medication management only     Interval History:     Patient did not display signs of nor endorse symptoms of overt psychosis or acute mood disorder requiring hospitalization during the encounter. Patient denied violent thoughts or suicidal or homicidal ideation, intent, or plan.    Pt is pleasant and cooperative on interview. This visit was done in person in the clinic.    Labs acceptable. No alcohol use since last encounter. Mood and anxiety is improving. Stress in multiple spheres continue to affect functioning.    Interested in VIVITROL if insurance would cover.    Changed instruments today. Severity changes may be artifact.     Instruments:     Routine Instruments   PROMIS-ANXIETY Interpretation: T-SCORE 65; MODERATE using 55-60-70 cutoffs. Stratification of anxiety severity was done with GAD7 last time; compared to MODERATE 13/21  last time, anxiety is stable.   PROMIS-DEPRESSION Interpretation: T-SCORE 61; MODERATE using 55-60-70 cutoffs. Stratification of depression severity was done with PHQ9 last time; compared to MILD 14/27 last time, depression WORSENED.   PSS4 Interpretation: 13/16; HIGH using 6-11 cutoffs. 2/2 PH, 2/2 LSE. Compared to HIGH 13/16 last time, PSS4 is stable.   Additional Instruments   MDI Interpretation: 17/50; NEGATIVE using 21-26-31 cutoffs. Compared to SEVERE DEPRESSION SEVERITY 34/50 last time, MDI IMPROVED. Will discontinue trending.        Review of Systems:     Personal Functioning   Sleep Better. Taking 2 hours.   Emotion Depression improving. Irritability improving. No crying spells.   Appetite Stable.   Stress Stable.   Anxiety Better.   Interpersonal Functioning   Home overwhelm   Peers about the same   Work stress     Current Evaluation:     Constitutional       Vitals:    03/07/23 1148   BP: 118/78  "  Pulse: (!) 56   Weight: 88.5 kg (195 lb 1.7 oz)   Height: 5' 7" (1.702 m)     General:  casual dress, obese (Class I, BMI 30.0 - 34.9)    Psychiatric / Mental Status Examination  1. Appearance: Dress is informal but appropriate. Motor activity normal.  2. Discourse: Clear speech with normal rate and volume. Associations intact. Orderly and without tangentiality.  3. Emotional Expression: Somewhat anxious and depressed mood. Affect is appropriate.  4. Perception and Thinking: No hallucinations. No suicidality, no homicidality, delusions, or paranoia.  5. Sensorium: Grossly intact. Able to focus for interview.  6. Memory and Fund of Knowledge: Intact for content of interview.  7. Insight and Judgment: Intact.    Neurological / Musculoskeletal / Osteopathic Structural  Gait, Station:  non-ataxic     Auto-populated Chart Data:     Laboratory Data  Lab Visit on 03/02/2023   Component Date Value Ref Range Status    Sodium 03/02/2023 138  136 - 145 mmol/L Final    Potassium 03/02/2023 3.6  3.5 - 5.1 mmol/L Final    Chloride 03/02/2023 101  95 - 110 mmol/L Final    CO2 03/02/2023 29  23 - 29 mmol/L Final    Glucose 03/02/2023 90  70 - 110 mg/dL Final    BUN 03/02/2023 11  6 - 20 mg/dL Final    Creatinine 03/02/2023 0.8  0.5 - 1.4 mg/dL Final    Calcium 03/02/2023 9.8  8.7 - 10.5 mg/dL Final    Total Protein 03/02/2023 7.1  6.0 - 8.4 g/dL Final    Albumin 03/02/2023 4.1  3.5 - 5.2 g/dL Final    Total Bilirubin 03/02/2023 0.6  0.1 - 1.0 mg/dL Final    Alkaline Phosphatase 03/02/2023 60  55 - 135 U/L Final    AST 03/02/2023 16  10 - 40 U/L Final    ALT 03/02/2023 14  10 - 44 U/L Final    Anion Gap 03/02/2023 8  8 - 16 mmol/L Final    eGFR 03/02/2023 >60.0  >60 mL/min/1.73 m^2 Final    WBC 03/02/2023 6.62  3.90 - 12.70 K/uL Final    RBC 03/02/2023 4.27  4.00 - 5.40 M/uL Final    Hemoglobin 03/02/2023 13.1  12.0 - 16.0 g/dL Final    Hematocrit 03/02/2023 40.7  37.0 - 48.5 % Final    MCV 03/02/2023 95  82 - 98 fL Final    MCH " 03/02/2023 30.7  27.0 - 31.0 pg Final    MCHC 03/02/2023 32.2  32.0 - 36.0 g/dL Final    RDW 03/02/2023 12.5  11.5 - 14.5 % Final    Platelets 03/02/2023 201  150 - 450 K/uL Final    MPV 03/02/2023 11.7  9.2 - 12.9 fL Final    Immature Granulocytes 03/02/2023 0.2  0.0 - 0.5 % Final    Gran # (ANC) 03/02/2023 4.4  1.8 - 7.7 K/uL Final    Immature Grans (Abs) 03/02/2023 0.01  0.00 - 0.04 K/uL Final    Lymph # 03/02/2023 1.5  1.0 - 4.8 K/uL Final    Mono # 03/02/2023 0.5  0.3 - 1.0 K/uL Final    Eos # 03/02/2023 0.2  0.0 - 0.5 K/uL Final    Baso # 03/02/2023 0.05  0.00 - 0.20 K/uL Final    nRBC 03/02/2023 0  0 /100 WBC Final    Gran % 03/02/2023 65.7  38.0 - 73.0 % Final    Lymph % 03/02/2023 22.8  18.0 - 48.0 % Final    Mono % 03/02/2023 7.3  4.0 - 15.0 % Final    Eosinophil % 03/02/2023 3.2  0.0 - 8.0 % Final    Basophil % 03/02/2023 0.8  0.0 - 1.9 % Final    Differential Method 03/02/2023 Automated   Final    Cholesterol 03/02/2023 149  120 - 199 mg/dL Final    Triglycerides 03/02/2023 68  30 - 150 mg/dL Final    HDL 03/02/2023 45  40 - 75 mg/dL Final    LDL Cholesterol 03/02/2023 90.4  63.0 - 159.0 mg/dL Final    HDL/Cholesterol Ratio 03/02/2023 30.2  20.0 - 50.0 % Final    Total Cholesterol/HDL Ratio 03/02/2023 3.3  2.0 - 5.0 Final    Non-HDL Cholesterol 03/02/2023 104  mg/dL Final    TSH 03/02/2023 0.403  0.400 - 4.000 uIU/mL Final    Hemoglobin A1C 03/02/2023 5.4  4.0 - 5.6 % Final    Estimated Avg Glucose 03/02/2023 108  68 - 131 mg/dL Final    Vitamin B-12 03/02/2023 689  210 - 950 pg/mL Final       Medications  Outpatient Encounter Medications as of 3/7/2023   Medication Sig Dispense Refill    albuterol (PROVENTIL/VENTOLIN HFA) 90 mcg/actuation inhaler Inhale 2 puffs into the lungs every 6 (six) hours as needed for Wheezing. Rescue 18 g 11    ergocalciferol (ERGOCALCIFEROL) 50,000 unit Cap Take 1 capsule (50,000 Units total) by mouth every 7 days. 12 capsule 3    hydroCHLOROthiazide (HYDRODIURIL) 25 MG tablet  Take 0.5 tablets (12.5 mg total) by mouth once daily. 90 tablet 3    levothyroxine (SYNTHROID) 50 MCG tablet Take 1 tablet (50 mcg total) by mouth before breakfast. 90 tablet 3    meloxicam (MOBIC) 15 MG tablet TAKE 1 TABLET BY MOUTH EVERY DAY 30 tablet 0    naltrexone (DEPADE) 50 mg tablet Take 50 mg by mouth once daily. 30 tablet 2    rosuvastatin (CRESTOR) 20 MG tablet Take 1 tablet (20 mg total) by mouth once daily. 90 tablet 3    traZODone (DESYREL) 50 MG tablet Take 1 tablet (50 mg total) by mouth every evening. 30 tablet 1    [DISCONTINUED] FLUoxetine 40 MG capsule Take 1 capsule (40 mg total) by mouth once daily. 30 capsule 0    FLUoxetine 40 MG capsule Take 1 capsule (40 mg total) by mouth once daily. 30 capsule 1    valACYclovir (VALTREX) 1000 MG tablet Take 2 tablets (2,000 mg total) by mouth 2 (two) times daily. for 1 day 8 tablet 5    [DISCONTINUED] hydroCHLOROthiazide (HYDRODIURIL) 25 MG tablet Take 1 tablet (25 mg total) by mouth once daily. 90 tablet 3     No facility-administered encounter medications on file as of 3/7/2023.       Billing Documentation:     Method of Encounter IN PERSON visit at the clinic   Type of Encounter Follow up visit with me   Counseling;  Psychotherapy Not applicable: Not done or UNDER 16 minutes   Counseling;  Tobacco and/or Nicotine Not applicable: Not done or UNDER 3 minutes   Additional Codes and Modifiers None   Time Remaining Chart/Pt 97527: FOLLOW UP VISIT 30 minutes   Total Mins  (3/7/2023) 030        Bandar Sheridan DO  Department of Psychiatry

## 2023-03-08 ENCOUNTER — PATIENT MESSAGE (OUTPATIENT)
Dept: FAMILY MEDICINE | Facility: CLINIC | Age: 48
End: 2023-03-08
Payer: COMMERCIAL

## 2023-03-13 ENCOUNTER — OFFICE VISIT (OUTPATIENT)
Dept: PSYCHIATRY | Facility: CLINIC | Age: 48
End: 2023-03-13
Payer: COMMERCIAL

## 2023-03-13 DIAGNOSIS — F41.0 GENERALIZED ANXIETY DISORDER WITH PANIC ATTACKS: ICD-10-CM

## 2023-03-13 DIAGNOSIS — F41.1 GENERALIZED ANXIETY DISORDER WITH PANIC ATTACKS: ICD-10-CM

## 2023-03-13 DIAGNOSIS — F33.0 MILD EPISODE OF RECURRENT MAJOR DEPRESSIVE DISORDER: ICD-10-CM

## 2023-03-13 DIAGNOSIS — F10.10 ALCOHOL ABUSE: ICD-10-CM

## 2023-03-13 PROCEDURE — 99999 PR PBB SHADOW E&M-EST. PATIENT-LVL II: CPT | Mod: PBBFAC,,, | Performed by: SOCIAL WORKER

## 2023-03-13 PROCEDURE — 99999 PR PBB SHADOW E&M-EST. PATIENT-LVL II: ICD-10-PCS | Mod: PBBFAC,,, | Performed by: SOCIAL WORKER

## 2023-03-13 PROCEDURE — 90791 PR PSYCHIATRIC DIAGNOSTIC EVALUATION: ICD-10-PCS | Mod: S$GLB,,, | Performed by: SOCIAL WORKER

## 2023-03-13 PROCEDURE — 90791 PSYCH DIAGNOSTIC EVALUATION: CPT | Mod: S$GLB,,, | Performed by: SOCIAL WORKER
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
## 2023-03-21 ENCOUNTER — HOSPITAL ENCOUNTER (OUTPATIENT)
Dept: RADIOLOGY | Facility: HOSPITAL | Age: 48
Discharge: HOME | End: 2023-03-21
Attending: INTERNAL MEDICINE
Payer: COMMERCIAL

## 2023-03-21 DIAGNOSIS — K75.81 STEATOHEPATITIS: ICD-10-CM

## 2023-03-21 DIAGNOSIS — R93.5 ABNORMAL US (ULTRASOUND) OF ABDOMEN: ICD-10-CM

## 2023-03-21 DIAGNOSIS — R16.0 HEPATOMEGALY: ICD-10-CM

## 2023-03-21 PROCEDURE — 74183 MRI ABDOMEN W WO CONTRAST: ICD-10-PCS | Mod: 26,,, | Performed by: RADIOLOGY

## 2023-03-21 PROCEDURE — 74183 MRI ABD W/O CNTR FLWD CNTR: CPT | Mod: TC,PO

## 2023-03-21 PROCEDURE — A9585 GADOBUTROL INJECTION: HCPCS | Mod: PO | Performed by: INTERNAL MEDICINE

## 2023-03-21 PROCEDURE — 74183 MRI ABD W/O CNTR FLWD CNTR: CPT | Mod: 26,,, | Performed by: RADIOLOGY

## 2023-03-21 PROCEDURE — 25500020 PHARM REV CODE 255: Mod: PO | Performed by: INTERNAL MEDICINE

## 2023-03-21 RX ORDER — GADOBUTROL 604.72 MG/ML
10 INJECTION INTRAVENOUS
Status: COMPLETED | OUTPATIENT
Start: 2023-03-21 | End: 2023-03-21

## 2023-03-21 RX ADMIN — GADOBUTROL 8 ML: 604.72 INJECTION INTRAVENOUS at 11:03

## 2023-03-21 NOTE — PATIENT INSTRUCTIONS
"Start NALTREXONE 50mg daily    Continue to avoid alcohol    There is an over-the-counter version of L-Methylfolate called "OPTIFOLATE L-METHYLFOLATE 7.5mg," which costs between $16 and $32. You can get it on [employer], and I think [employer] may carry it as well. There are other alternatives, but this one seems to be the most affordable on the market. If you find a better alternative, just make sure that it is L-METHYLFOLATE, not just "folate" or "folic acid," and that the dose is in "mg" not "mcg" (because 1mcg is only a thousandth the amount of a mg). Also let me know so I can spread the news to other patients.    Don't go to bed unless you're drowsy.  If you're awake in bed for longer than 30mins, leave the bedroom, and don't return until you're drowsy.  Set an alarm every morning, including on weekends, and don't snooze.      " None

## 2023-03-30 ENCOUNTER — OFFICE VISIT (OUTPATIENT)
Dept: HEPATOLOGY | Facility: CLINIC | Age: 48
End: 2023-03-30
Payer: COMMERCIAL

## 2023-03-30 VITALS
TEMPERATURE: 99 F | OXYGEN SATURATION: 99 % | DIASTOLIC BLOOD PRESSURE: 97 MMHG | WEIGHT: 195 LBS | BODY MASS INDEX: 30.61 KG/M2 | HEART RATE: 87 BPM | SYSTOLIC BLOOD PRESSURE: 141 MMHG | HEIGHT: 67 IN

## 2023-03-30 DIAGNOSIS — R16.0 HEPATOMEGALY: ICD-10-CM

## 2023-03-30 DIAGNOSIS — K76.9 LIVER LESION: ICD-10-CM

## 2023-03-30 DIAGNOSIS — R93.2 ABNORMAL MRI, LIVER: ICD-10-CM

## 2023-03-30 DIAGNOSIS — K75.81 STEATOHEPATITIS: ICD-10-CM

## 2023-03-30 DIAGNOSIS — R16.0 LIVER MASSES: ICD-10-CM

## 2023-03-30 PROBLEM — K62.5 RECTAL BLEEDING: Status: RESOLVED | Noted: 2021-10-28 | Resolved: 2023-03-30

## 2023-03-30 PROCEDURE — 99999 PR PBB SHADOW E&M-EST. PATIENT-LVL V: CPT | Mod: PBBFAC,,, | Performed by: INTERNAL MEDICINE

## 2023-03-30 PROCEDURE — 99999 PR PBB SHADOW E&M-EST. PATIENT-LVL V: ICD-10-PCS | Mod: PBBFAC,,, | Performed by: INTERNAL MEDICINE

## 2023-03-30 PROCEDURE — 99204 PR OFFICE/OUTPT VISIT, NEW, LEVL IV, 45-59 MIN: ICD-10-PCS | Mod: S$GLB,,, | Performed by: INTERNAL MEDICINE

## 2023-03-30 PROCEDURE — 99204 OFFICE O/P NEW MOD 45 MIN: CPT | Mod: S$GLB,,, | Performed by: INTERNAL MEDICINE

## 2023-03-30 NOTE — PROGRESS NOTES
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 heterozygosity for MTHFR mutation C677T who was noted to have elevated liver enzymes in the setting of heavy alcohol and underwent both an abdo US and MRI abdo that showed liver lesions:    Labs:  8/24/22: ALT 96, AST 60, ALKP  57, Tbil 0.8  9/1/22: , aST 71, ALKP 67, Tbil 0.9  Stopped drinking  3/2/22: ALT 14, AST 16, ALKP 60, Tbil 0.6 (were also normal prior to 8/24/22)  HCV Ab-,   Alcohol: heavy most of her life-stopped drinking after 9/1/22 labs  BMI: 30.5    Abdo US 9/7/22: Hepatomegaly with increased echotexture to the liver suggestive of hepatic steatosis; Focal hypoechoic area along the undersurface of the left hepatic lobe that measures approximately 2.5 x 1.9 x 2.6 cm and likely represents sparing of fatty infiltration.  If further evaluation is necessary either a dedicated CT scan of the liver with and without contrast or an MRI of the liver is suggested.  MRI abdo w wo gadavist 3/21/23: The the liver is enlarged and measures 19.7 cm.  The spleen measures 8.8 cm in craniocaudal dimensions.  There is diffuse fatty liver infiltration.  The there is no evidence of biliary ductal dilatation.  Common bile duct is unremarkable.  The pancreatic duct is of normal caliber.  There is a subcapsular hypervascular mass measuring 2.2 x 1.6 cm the in hepatic segment 2.  This is slightly hypointense on T1 weighted sequences and hyperintense on T2 weighted sequences.  On the late imaging this is isointense.  Differential considerations would include focal nodular hyperplasia, adenoma and hepatocellular carcinoma.  There is a 2nd hypervascular focus this measuring 9 mm in the anterior segment right lobe inferiorly (segment  5).  This is also isointense on the portal venous and delayed images.  Seminal to similar diff differential considerations should be considered.  A 3rd hypervascular focus is noted inferior to this in the hepatic segment 6 measuring 7 mm. Impression: There is somewhat heterogeneous fatty liver infiltration and there are 3 hypervascular hepatic mass lesions.  Differential considerations include hepatic adenoma, focal nodular hyperplasia and hepatocellular carcinoma    ---no hx BCP  --colonsocopy 2021- no polyps  --mammogram 09/22- negative    The patient denies any symptoms of decompensated cirrhosis, including no ascites or edema, cognitive problems that would suggest hepatic encephalopathy, or GI bleeding from varices.       Chief Complaint   Patient presents with    Abnormal Abdominal/Liver Imaging       Past Medical History:   Diagnosis Date    Abdominal mass, left lower quadrant 11/2016    Anxiety disorder 2010    Asthma     Bilateral ovarian cysts     Cancer antigen 125 () elevation 11/2016    High cholesterol     History of hemorrhoids     Incisional abscess     right breast I/D abscess 2012    MTHFR mutation     Thrombocytopenia complicating pregnancy 2010 and 2012     Outpatient Encounter Medications as of 3/30/2023   Medication Sig Dispense Refill    albuterol (PROVENTIL/VENTOLIN HFA) 90 mcg/actuation inhaler Inhale 2 puffs into the lungs every 6 (six) hours as needed for Wheezing. Rescue 18 g 11    ergocalciferol (ERGOCALCIFEROL) 50,000 unit Cap Take 1 capsule (50,000 Units total) by mouth every 7 days. 12 capsule 3    FLUoxetine 40 MG capsule Take 1 capsule (40 mg total) by mouth once daily. 30 capsule 1    hydroCHLOROthiazide (HYDRODIURIL) 25 MG tablet Take 0.5 tablets (12.5 mg total) by mouth once daily. 90 tablet 3    levothyroxine (SYNTHROID) 50 MCG tablet Take 1 tablet (50 mcg total) by mouth before breakfast. 90 tablet 3    meloxicam (MOBIC) 15 MG tablet TAKE 1 TABLET BY MOUTH EVERY DAY 30  tablet 0    naltrexone (DEPADE) 50 mg tablet Take 50 mg by mouth once daily. 30 tablet 2    rosuvastatin (CRESTOR) 20 MG tablet Take 1 tablet (20 mg total) by mouth once daily. 90 tablet 3    traZODone (DESYREL) 50 MG tablet Take 1 tablet (50 mg total) by mouth every evening. 30 tablet 1    valACYclovir (VALTREX) 1000 MG tablet Take 2 tablets (2,000 mg total) by mouth 2 (two) times daily. for 1 day 8 tablet 5    [DISCONTINUED] meloxicam (MOBIC) 15 MG tablet TAKE 1 TABLET BY MOUTH EVERY DAY 30 tablet 0     Facility-Administered Encounter Medications as of 3/30/2023   Medication Dose Route Frequency Provider Last Rate Last Admin    naltrexone microspheres kit SSRR 380 mg  380 mg Intramuscular 1 time in Clinic/HOD Bandar Sheridan DO         Review of patient's allergies indicates:   Allergen Reactions    Penicillins Hives     Family History   Problem Relation Age of Onset    Celiac disease Maternal Aunt     Hepatitis Maternal Aunt         autoimmune    Colon cancer Maternal Grandmother     Cancer Maternal Grandmother     Hepatitis Mother         autoimmune    Hypertension Mother     Pulmonary embolism Mother     Hypertension Father     Hyperlipidemia Father     Heart disease Father     Early death Father 53        MI    No Known Problems Brother     Breast cancer Neg Hx     Diabetes Neg Hx     Miscarriages / Stillbirths Neg Hx     Ovarian cancer Neg Hx     Stroke Neg Hx        Social History     Socioeconomic History    Marital status:     Number of children: 2   Tobacco Use    Smoking status: Every Day     Types: Vaping with nicotine    Smokeless tobacco: Never    Tobacco comments:     on wellbutrin, smokes 3 cigs per week   Substance and Sexual Activity    Alcohol use: Yes     Alcohol/week: 10.0 standard drinks     Types: 10 Glasses of wine per week    Drug use: No    Sexual activity: Yes     Partners: Male   Social History Narrative         Review of Systems   Constitutional: Negative.    HENT:  Negative.     Eyes: Negative.    Respiratory: Negative.     Cardiovascular: Negative.    Gastrointestinal: Negative.    Genitourinary: Negative.    Musculoskeletal: Negative.    Skin: Negative.    Neurological: Negative.    Psychiatric/Behavioral: Negative.     Vitals:    03/30/23 1605   BP: (!) 141/97   Pulse: 87   Temp: 98.6 °F (37 °C)       Physical Exam  Vitals reviewed.   Constitutional:       Appearance: She is well-developed.   HENT:      Head: Normocephalic and atraumatic.   Eyes:      General: No scleral icterus.     Conjunctiva/sclera: Conjunctivae normal.      Pupils: Pupils are equal, round, and reactive to light.   Neck:      Thyroid: No thyromegaly.   Cardiovascular:      Rate and Rhythm: Normal rate and regular rhythm.      Heart sounds: Normal heart sounds.   Pulmonary:      Effort: Pulmonary effort is normal.      Breath sounds: Normal breath sounds. No rales.   Abdominal:      General: Bowel sounds are normal. There is no distension.      Palpations: Abdomen is soft. There is no mass.      Tenderness: There is no abdominal tenderness.   Musculoskeletal:         General: Normal range of motion.      Cervical back: Normal range of motion and neck supple.   Skin:     General: Skin is warm and dry.      Findings: No rash.   Neurological:      Mental Status: She is alert and oriented to person, place, and time.       Computed MELD-Na score unavailable. Necessary lab results were not found in the last year.  Computed MELD score unavailable. Necessary lab results were not found in the last year.    Lab Results   Component Value Date    GLU 90 03/02/2023    BUN 11 03/02/2023    CREATININE 0.8 03/02/2023    CALCIUM 9.8 03/02/2023     03/02/2023    K 3.6 03/02/2023     03/02/2023    PROT 7.1 03/02/2023    CO2 29 03/02/2023    ANIONGAP 8 03/02/2023    WBC 6.62 03/02/2023    RBC 4.27 03/02/2023    HGB 13.1 03/02/2023    HCT 40.7 03/02/2023    MCV 95 03/02/2023    MCH 30.7 03/02/2023    MCHC 32.2  03/02/2023     Lab Results   Component Value Date    RDW 12.5 03/02/2023     03/02/2023    MPV 11.7 03/02/2023    GRAN 4.4 03/02/2023    GRAN 65.7 03/02/2023    LYMPH 1.5 03/02/2023    LYMPH 22.8 03/02/2023    MONO 0.5 03/02/2023    MONO 7.3 03/02/2023    EOSINOPHIL 3.2 03/02/2023    BASOPHIL 0.8 03/02/2023    EOS 0.2 03/02/2023    BASO 0.05 03/02/2023    APTT 31.5 11/25/2016    GROUPTRH A POS 11/25/2016    CHOL 149 03/02/2023    TRIG 68 03/02/2023    HDL 45 03/02/2023    CHOLHDL 30.2 03/02/2023    TOTALCHOLEST 3.3 03/02/2023    ALBUMIN 4.1 03/02/2023    BILIDIR 0.2 09/30/2022    AST 16 03/02/2023    ALT 14 03/02/2023    ALKPHOS 60 03/02/2023    INR 1.0 11/25/2016       Assessment and Plan:    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 She has now quit. She has 3 liver lesion on MRI. The etiology is unclear-adenoma, FNH and HCC are in the differential. There is nothing from hx, PE, labs or imaging that suggests advanced fibrosis/cirrhosis. Absence of advanced fibrosis makes HCC less likely. I am recommending and MRE to screen for fibrosis and an MRI w wo eovist to further evaluate the liver lesions. Eovist is better at distinguishing adenoma vs FNH.    Return 4-6 weeks. I will also check an AFP and CA 19-9.

## 2023-03-30 NOTE — Clinical Note
1. Check AFP and CA 19-9 2. MRE  3. MRI w wo eovist  4. Return 4-6 weeks Please call pt and schedule tests and then return with me

## 2023-03-31 ENCOUNTER — PATIENT MESSAGE (OUTPATIENT)
Dept: HEPATOLOGY | Facility: CLINIC | Age: 48
End: 2023-03-31
Payer: COMMERCIAL

## 2023-03-31 ENCOUNTER — TELEPHONE (OUTPATIENT)
Dept: HEPATOLOGY | Facility: CLINIC | Age: 48
End: 2023-03-31
Payer: COMMERCIAL

## 2023-03-31 NOTE — PROGRESS NOTES
Outpatient Psychiatry Followup Visit (DO/MD/NP)    4/4/2023    Ivory Cade, a 47 y.o. female, presenting for followup visit.     Assessment - Diagnosis - Goals:     Diagnostic Impression     ICD-10-CM ICD-9-CM   1. Mild episode of recurrent major depressive disorder  F33.0 296.31   2. Generalized anxiety disorder with panic attacks  F41.1 300.02    F41.0 300.01   3. Alcohol use disorder in remission  F10.91 305.03   4. Vitamin D insufficiency  E55.9 268.9   5. Nicotine dependence due to vaping non-tobacco product  F17.200 305.1   6. BMI 29.0-29.9,adult  Z68.29 V85.25      Progress See most recent case formulation. Forward progress.     Medication Management   Chart was reviewed. The risks and benefits of medication were discussed with pt. The treatment plan and followup plan were reviewed with pt. Pt understands to contact clinic if symptoms worsen. Pt understand to call 911 or go to nearest ER for suicidal ideation, intent or plan.   RX History Psychotropic history includes  ATARAX/VISTARIL, CELEXA, CLONIDINE, DIPHENHYDRAMINE, EFFEXOR, ELAVIL, GABAPENTIN, NALTREXONE, PROZAC, REMERON, RESTORIL, TRAZODONE, WELLBUTRIN, and ZOLOFT   Current RX Continue PROZAC  No excessive activation 2FEB2023, 07MAR2023  Titration:  [id number]: Increase to 40mg daily  10JAN2023: Increase to 20mg daily  [id number]: Start 10mg daily  Prior to evaluation pt had been taking EFFEXOR and WELLBUTRIN  Increase TRAZODONE  More appropriate to use long term than ATARAX  Titration:  04APR2023: Increase to 100mg nightly  10JAN2023: Adjust 50mg HS prn sleep  [id number]: Start 50mg HS  Prior to evaluation pt had been taking ATARAX  Continue NALTREXONE   Sober since 28JAN2023  No opioid use  VIVITROL approval pending since 07MAR2023  Medication adjustments:  [id number]: Start 50mg daily  Continue OTC L-METHYLFOLATE  C677T C/T  Titration:  02FEB2023: Start 7.5mg daily  Start NRT 10w taper 04APR2023   Education & Counseling RX administration and  adherence   Other Orders Continue psychotherapy  Repeat Vit D level   Monitor VITAL SIGNS  Use of: alcohol  Instruments: PROMIS (A&D), PSS4   RETURN 4 weeks/1 month  for medication management only     Interval History:     Patient did not display signs of nor endorse symptoms of overt psychosis or acute mood disorder requiring hospitalization during the encounter. Patient denied violent thoughts or suicidal or homicidal ideation, intent, or plan.     Pt is pleasant and cooperative on interview. This visit was done in person in the clinic.    Seeing hepatologist for lesions on liver. This month's LFTs are wnl. Taking NALTREXONE. No drinking.     Blood pressure better, and not taking any BP meds.    Likes medications. VIVITROL approval pending.    Continues to vape. Interested in patches.    Met with therapist and encouraged to continue.     Personal Functioning   Sleep Better.   Emotion About the same.  MOOD DYSREGULATION: irritability improving.  MOOD DYSREGULATION: no crying spells.   Appetite Overall no concerns, or concerns are minimal.   Stress Overwhelm.   Anxiety RUMINATION: worrying worse.   Cognition MEMORY: about the same.   Interpersonal Functioning   Home overwhelm   Peers interpersonal deficits noted   Work no concerns identified     Instruments:     Routine Instruments   PROMIS-ANXIETY Interpretation: T-SCORE 65; MODERATE using 55-60-70 cutoffs. Compared to MODERATE (65) last time, PROMIS-ANXIETY is about the same.   PROMIS-DEPRESSION Interpretation: T-SCORE 62; MODERATE using 55-60-70 cutoffs. Compared to MODERATE (61) last time, PROMIS-DEPRESSION shows NO significant change.   PSS4 Interpretation: 13/16; HIGH using 6-11 cutoffs. 1/2 PH, 2/2 LSE. Compared to HIGH 13/16 last time, PSS4 is about the same.   Additional Instruments   N/A        Review of Systems:     See HPI.    Current Evaluation:     Constitutional       Vitals:    04/04/23 1118   BP: 123/80   Pulse: 64   Weight: 86.7 kg (191 lb 2.2 oz)  "  Height: 5' 7" (1.702 m)     General:  casual dress, overweight (BMI 25.0 - 29.9)    Psychiatric / Mental Status Examination  1. Appearance: Dress is informal but appropriate. Motor activity normal.  2. Discourse: Clear speech with normal rate and volume. Associations intact. Orderly.  3. Emotional Expression: Somewhat anxious and depressed mood. Affect is appropriate.  4. Perception and Thinking: No hallucinations. No suicidality, no homicidality, delusions, or paranoia.  5. Sensorium: Grossly intact. Able to focus for interview.  6. Memory and Fund of Knowledge: Intact for content of interview.  7. Insight and Judgment: Intact.    Neurological / Musculoskeletal / Osteopathic Structural  Gait, Station:  non-ataxic     Auto-populated chart data omitted from this note for brevity.    Billing Documentation:     Method of Encounter IN PERSON visit at the clinic   Type of Encounter Follow up visit with me   Counseling;  Psychotherapy Not applicable: Not done or UNDER 16 minutes   Counseling;  Tobacco and/or Nicotine Not applicable: Not done or UNDER 3 minutes   Additional Codes and Modifiers N/A   Time Remaining Chart/Pt 46946: MEDICATION MANAGEMENT FOLLOW UP VISIT 52 minutes   Total Mins  (4/4/2023) 051        Bandar Sheridan DO  Department of Psychiatry    "

## 2023-04-03 ENCOUNTER — TELEPHONE (OUTPATIENT)
Dept: HEPATOLOGY | Facility: CLINIC | Age: 48
End: 2023-04-03
Payer: COMMERCIAL

## 2023-04-04 ENCOUNTER — OFFICE VISIT (OUTPATIENT)
Dept: PSYCHIATRY | Facility: CLINIC | Age: 48
End: 2023-04-04
Payer: COMMERCIAL

## 2023-04-04 VITALS
HEIGHT: 67 IN | SYSTOLIC BLOOD PRESSURE: 123 MMHG | WEIGHT: 191.13 LBS | DIASTOLIC BLOOD PRESSURE: 80 MMHG | BODY MASS INDEX: 30 KG/M2 | HEART RATE: 64 BPM

## 2023-04-04 DIAGNOSIS — E55.9 VITAMIN D INSUFFICIENCY: ICD-10-CM

## 2023-04-04 DIAGNOSIS — F10.91 ALCOHOL USE DISORDER IN REMISSION: ICD-10-CM

## 2023-04-04 DIAGNOSIS — F33.0 MILD EPISODE OF RECURRENT MAJOR DEPRESSIVE DISORDER: Primary | ICD-10-CM

## 2023-04-04 DIAGNOSIS — F17.200 NICOTINE DEPENDENCE DUE TO VAPING NON-TOBACCO PRODUCT: ICD-10-CM

## 2023-04-04 DIAGNOSIS — G47.00 INSOMNIA, UNSPECIFIED TYPE: ICD-10-CM

## 2023-04-04 DIAGNOSIS — F41.0 GENERALIZED ANXIETY DISORDER WITH PANIC ATTACKS: ICD-10-CM

## 2023-04-04 DIAGNOSIS — F41.1 GENERALIZED ANXIETY DISORDER WITH PANIC ATTACKS: ICD-10-CM

## 2023-04-04 PROCEDURE — 99999 PR PBB SHADOW E&M-EST. PATIENT-LVL III: ICD-10-PCS | Mod: PBBFAC,,, | Performed by: STUDENT IN AN ORGANIZED HEALTH CARE EDUCATION/TRAINING PROGRAM

## 2023-04-04 PROCEDURE — 99999 PR PBB SHADOW E&M-EST. PATIENT-LVL III: CPT | Mod: PBBFAC,,, | Performed by: STUDENT IN AN ORGANIZED HEALTH CARE EDUCATION/TRAINING PROGRAM

## 2023-04-04 PROCEDURE — 99215 PR OFFICE/OUTPT VISIT, EST, LEVL V, 40-54 MIN: ICD-10-PCS | Mod: S$GLB,,, | Performed by: STUDENT IN AN ORGANIZED HEALTH CARE EDUCATION/TRAINING PROGRAM

## 2023-04-04 PROCEDURE — 99215 OFFICE O/P EST HI 40 MIN: CPT | Mod: S$GLB,,, | Performed by: STUDENT IN AN ORGANIZED HEALTH CARE EDUCATION/TRAINING PROGRAM

## 2023-04-04 RX ORDER — TRAZODONE HYDROCHLORIDE 100 MG/1
100 TABLET ORAL NIGHTLY
Qty: 30 TABLET | Refills: 2 | Status: SHIPPED | OUTPATIENT
Start: 2023-04-04 | End: 2023-05-03 | Stop reason: DRUGHIGH

## 2023-04-04 RX ORDER — IBUPROFEN 200 MG
1 TABLET ORAL DAILY
Qty: 14 PATCH | Refills: 0 | Status: SHIPPED | OUTPATIENT
Start: 2023-04-04 | End: 2023-04-18

## 2023-04-04 RX ORDER — IBUPROFEN 200 MG
1 TABLET ORAL DAILY
Qty: 42 PATCH | Refills: 0 | Status: SHIPPED | OUTPATIENT
Start: 2023-04-04 | End: 2023-05-16

## 2023-04-04 RX ORDER — FLUOXETINE HYDROCHLORIDE 40 MG/1
40 CAPSULE ORAL DAILY
Qty: 30 CAPSULE | Refills: 2 | Status: SHIPPED | OUTPATIENT
Start: 2023-04-04 | End: 2023-05-03 | Stop reason: SDUPTHER

## 2023-04-04 RX ORDER — NICOTINE 7MG/24HR
1 PATCH, TRANSDERMAL 24 HOURS TRANSDERMAL DAILY
Qty: 14 PATCH | Refills: 0 | Status: SHIPPED | OUTPATIENT
Start: 2023-04-04 | End: 2023-04-18

## 2023-04-04 NOTE — PATIENT INSTRUCTIONS
Take L-METHYLFOLATE 7.5mg daily in the morning  Continue PROZAC 40mg daily in the morning    Increase TRAZODONE to 100mg NIGHTLY every night    Follow up with primary care     Please complete lab work at your earliest convenience. Your labs are NON-FASTING.    Keep appointments with therapist    Start Nicotine Replacement Therapy: Place one patch onto the skin once daily. The next day, remove it and replace it with a new patch. Use this taper schedule:  Weeks 1-6: 21mg  Weeks 7-8: 14mg  Weeks 9-10: 7mg  Weeks 11+: Stop using patches

## 2023-04-14 ENCOUNTER — PATIENT MESSAGE (OUTPATIENT)
Dept: HEPATOLOGY | Facility: CLINIC | Age: 48
End: 2023-04-14
Payer: COMMERCIAL

## 2023-04-20 ENCOUNTER — HOSPITAL ENCOUNTER (OUTPATIENT)
Dept: RADIOLOGY | Facility: HOSPITAL | Age: 48
Discharge: HOME | End: 2023-04-20
Attending: INTERNAL MEDICINE
Payer: COMMERCIAL

## 2023-04-20 DIAGNOSIS — R16.0 LIVER MASSES: ICD-10-CM

## 2023-04-20 DIAGNOSIS — K76.9 LIVER LESION: Primary | ICD-10-CM

## 2023-04-20 PROCEDURE — A9581 GADOXETATE DISODIUM INJ: HCPCS | Performed by: INTERNAL MEDICINE

## 2023-04-20 PROCEDURE — 76391 MR ELASTOGRAPHY: CPT | Mod: 26,,, | Performed by: STUDENT IN AN ORGANIZED HEALTH CARE EDUCATION/TRAINING PROGRAM

## 2023-04-20 PROCEDURE — 76391 MR ELASTOGRAPHY: CPT | Mod: TC

## 2023-04-20 PROCEDURE — 74183 MRI ABD W/O CNTR FLWD CNTR: CPT | Mod: 26,,, | Performed by: STUDENT IN AN ORGANIZED HEALTH CARE EDUCATION/TRAINING PROGRAM

## 2023-04-20 PROCEDURE — 74183 MRI ABDOMEN W WO CONTRAST: ICD-10-PCS | Mod: 26,,, | Performed by: STUDENT IN AN ORGANIZED HEALTH CARE EDUCATION/TRAINING PROGRAM

## 2023-04-20 PROCEDURE — 76391 MR ELASTOGRAPHY: ICD-10-PCS | Mod: 26,,, | Performed by: STUDENT IN AN ORGANIZED HEALTH CARE EDUCATION/TRAINING PROGRAM

## 2023-04-20 PROCEDURE — 25500020 PHARM REV CODE 255: Performed by: INTERNAL MEDICINE

## 2023-04-20 PROCEDURE — 74183 MRI ABD W/O CNTR FLWD CNTR: CPT | Mod: TC

## 2023-04-20 RX ADMIN — GADOXETATE DISODIUM 10 ML: 181.43 INJECTION, SOLUTION INTRAVENOUS at 02:04

## 2023-04-21 ENCOUNTER — TELEPHONE (OUTPATIENT)
Dept: HEPATOLOGY | Facility: CLINIC | Age: 48
End: 2023-04-21
Payer: COMMERCIAL

## 2023-04-24 ENCOUNTER — OFFICE VISIT (OUTPATIENT)
Dept: PSYCHIATRY | Facility: CLINIC | Age: 48
End: 2023-04-24
Payer: COMMERCIAL

## 2023-04-24 DIAGNOSIS — F41.0 GENERALIZED ANXIETY DISORDER WITH PANIC ATTACKS: Primary | ICD-10-CM

## 2023-04-24 DIAGNOSIS — F41.1 GENERALIZED ANXIETY DISORDER WITH PANIC ATTACKS: Primary | ICD-10-CM

## 2023-04-24 DIAGNOSIS — F33.0 MILD EPISODE OF RECURRENT MAJOR DEPRESSIVE DISORDER: ICD-10-CM

## 2023-04-24 PROCEDURE — 90834 PSYTX W PT 45 MINUTES: CPT | Mod: S$GLB,,, | Performed by: SOCIAL WORKER

## 2023-04-24 PROCEDURE — 99999 PR PBB SHADOW E&M-EST. PATIENT-LVL II: ICD-10-PCS | Mod: PBBFAC,,, | Performed by: SOCIAL WORKER

## 2023-04-24 PROCEDURE — 99999 PR PBB SHADOW E&M-EST. PATIENT-LVL II: CPT | Mod: PBBFAC,,, | Performed by: SOCIAL WORKER

## 2023-04-24 PROCEDURE — 90834 PR PSYCHOTHERAPY W/PATIENT, 45 MIN: ICD-10-PCS | Mod: S$GLB,,, | Performed by: SOCIAL WORKER

## 2023-04-25 NOTE — PROGRESS NOTES
Individual Psychotherapy (PhD/LCSW)    4/24/2023    Site:  [facility]         Therapeutic Intervention: Met with patient.  Outpatient - Insight oriented psychotherapy 45 min - CPT code 74861, Outpatient - Behavior modifying psychotherapy 45 min - CPT code 74266, and Outpatient - Supportive psychotherapy 45 min - CPT Code 29293    Chief complaint/reason for encounter: depression, anxiety, and AUD             Interval history and content of current session: Ct was referred to tx by Dr Sheridan to address AUD, depression, and anxiety.  Client shared that she has been anxious.  She reported that she is still abstinent from alcohol.  She shared that she believes that she is now hoarding.  She shared about not being able to clean her house or to organize.  She shared how this is affecting her life and her children.   and client processed the importance of client having support and getting her friends and there to help her clean.  Client stated that she would do so.   had to redirect client in session as client had so many different thoughts going on at 1 time.  Though client was logical she was visibly anxious.  Client and  processed taking things 1 step at a time.   asked client what was the most important issue for her to address and client stated cleaning her house.  Client committed to reaching out to her close friend to help her de clutter.   will follow-up with client in one-to-one sessions.      Treatment plan:  Target symptoms: depression, distractability, lack of focus, anxiety , substance abuse  Why chosen therapy is appropriate versus another modality: relevant to diagnosis, patient responds to this modality, evidence based practice  Outcome monitoring methods: self-report  Therapeutic intervention type: insight oriented psychotherapy, behavior modifying psychotherapy, supportive psychotherapy    Risk parameters:  Patient reports no suicidal  ideation  Patient reports no homicidal ideation  Patient reports no self-injurious behavior  Patient reports no violent behavior    CSSRS was completed: No risk    Mental Status Exam:  General Appearance:  unremarkable, age appropriate   Speech: normal tone, normal rate, normal pitch, normal volume      Level of Cooperation: cooperative      Thought Processes: racing   Mood: anxious, depressed      Thought Content: normal, no suicidality, no homicidality, delusions, or paranoia   Affect: congruent and appropriate   Orientation: Oriented x3   Memory: recent >  intact   Attention Span & Concentration: intact   Fund of General Knowledge: intact and appropriate to age and level of education   Abstract Reasoning: interpretation of similarities was abstract   Judgment & Insight: fair, intact     Language intact       Verbal deficits: None    Patient's response to intervention:  The patient's response to intervention is accepting.    Progress toward goals and other mental status changes:  The patient's progress toward goals is fair .    Diagnosis:     ICD-10-CM ICD-9-CM   1. Generalized anxiety disorder with panic attacks  F41.1 300.02    F41.0 300.01   2. Mild episode of recurrent major depressive disorder  F33.0 296.31       Plan:  individual psychotherapy and Ct to follow up with DR. Sheridan for psych med mgt  Pt to go to ED or call 911 if symptoms worsen or if she has thoughts of harming self and/or others. Pt verbalized understanding.    Return to clinic: as scheduled    Length of Service (minutes): 45      Each patient to whom he or she provides medical services by telemedicine is: (1) informed of the relationship between the physician and patient and the respective role of any other health care provider with respect to management of the patient; and (2) notified that he or she may decline to receive medical services by telemedicine and may withdraw from such care at any time.

## 2023-05-02 ENCOUNTER — OFFICE VISIT (OUTPATIENT)
Dept: FAMILY MEDICINE | Facility: CLINIC | Age: 48
End: 2023-05-02
Payer: COMMERCIAL

## 2023-05-02 ENCOUNTER — LAB VISIT (OUTPATIENT)
Dept: LAB | Facility: HOSPITAL | Age: 48
End: 2023-05-02
Attending: INTERNAL MEDICINE
Payer: COMMERCIAL

## 2023-05-02 VITALS
RESPIRATION RATE: 18 BRPM | WEIGHT: 182.31 LBS | HEIGHT: 67 IN | SYSTOLIC BLOOD PRESSURE: 126 MMHG | BODY MASS INDEX: 28.61 KG/M2 | DIASTOLIC BLOOD PRESSURE: 74 MMHG

## 2023-05-02 DIAGNOSIS — E55.9 VITAMIN D INSUFFICIENCY: ICD-10-CM

## 2023-05-02 DIAGNOSIS — E03.9 HYPOTHYROIDISM, UNSPECIFIED TYPE: Primary | ICD-10-CM

## 2023-05-02 DIAGNOSIS — E03.9 HYPOTHYROIDISM, UNSPECIFIED TYPE: ICD-10-CM

## 2023-05-02 DIAGNOSIS — E66.3 OVERWEIGHT: ICD-10-CM

## 2023-05-02 LAB — 25(OH)D3+25(OH)D2 SERPL-MCNC: 42 NG/ML (ref 30–96)

## 2023-05-02 PROCEDURE — 36415 COLL VENOUS BLD VENIPUNCTURE: CPT | Mod: PN | Performed by: INTERNAL MEDICINE

## 2023-05-02 PROCEDURE — 99999 PR PBB SHADOW E&M-EST. PATIENT-LVL III: ICD-10-PCS | Mod: PBBFAC,,, | Performed by: INTERNAL MEDICINE

## 2023-05-02 PROCEDURE — 99214 PR OFFICE/OUTPT VISIT, EST, LEVL IV, 30-39 MIN: ICD-10-PCS | Mod: S$PBB,,, | Performed by: INTERNAL MEDICINE

## 2023-05-02 PROCEDURE — 84443 ASSAY THYROID STIM HORMONE: CPT | Performed by: INTERNAL MEDICINE

## 2023-05-02 PROCEDURE — 99999 PR PBB SHADOW E&M-EST. PATIENT-LVL III: CPT | Mod: PBBFAC,,, | Performed by: INTERNAL MEDICINE

## 2023-05-02 PROCEDURE — 99214 OFFICE O/P EST MOD 30 MIN: CPT | Mod: S$PBB,,, | Performed by: INTERNAL MEDICINE

## 2023-05-02 PROCEDURE — 82652 VIT D 1 25-DIHYDROXY: CPT | Performed by: STUDENT IN AN ORGANIZED HEALTH CARE EDUCATION/TRAINING PROGRAM

## 2023-05-02 PROCEDURE — 82306 VITAMIN D 25 HYDROXY: CPT | Performed by: INTERNAL MEDICINE

## 2023-05-02 RX ORDER — PHENTERMINE HYDROCHLORIDE 37.5 MG/1
18.75 TABLET ORAL
Qty: 15 TABLET | Refills: 0 | Status: SHIPPED | OUTPATIENT
Start: 2023-05-02 | End: 2023-05-30 | Stop reason: SDUPTHER

## 2023-05-02 NOTE — PROGRESS NOTES
Assessment and Plan:    Needs Rx change after labs    1. Hypothyroidism, unspecified type  Change Rx to 25 mcg daily if TSH normal  - TSH; Future    2. Vitamin D insufficiency  - Vitamin D; Future    3. Overweight  Discussed options and have elected to start phentermine. Discussed that this medication is intended to supress appetite, and will not work wihtout the patient also focusing on diet and exercise. We discussed that long term success would be best achieved by getting on a structured diet plan and using this medication to assist with feelings of hunger and appetite while sticking to a healthy diet plan. Discussed that this should not be used for >3 months. Discussed that we will recheck weight, HR, and BP in 1 month, then can continue another 2 months if doing well.   - phentermine (ADIPEX-P) 37.5 mg tablet; Take 0.5 tablets (18.75 mg total) by mouth before breakfast.  Dispense: 15 tablet; Refill: 0    ______________________________________________________________________  Subjective:    Chief Complaint:  Follow up chronic medical conditions    HPI:  Ivory is a 47 y.o. year old female here for follow up of chronic medical conditions    Seeing psychiatry for depression, anxiety, insomnia, and alcohol dependence. She is currently taking fluoxetine 40, naltrexone 50 (, and trazodone 100 (dose increased since last visit). She had been drinking heavily until February of this year but has been sober for 11 weeks.     Steatohepatitis- Since I had last seen her has established with hepatology. Noted to have liver lesions, planning MRI liver in 6 months.     HLD- Last year LDL and TGs significantly elevated. Was back on cholesterol medication at the time of the last     Hypothyroidism- Prescribed levothyroxine 50 mcg daily, but has been taking 25 mcg daily as she was concerned about the borderline lab last time.     Vitamin D def- Taking ergocalciferol 50K weekly.     HTN- Taking HCTZ 25 mg daily PRN (has not been  needing this at all as BP was getting low), BP has been low since stopping this.    She is currently vaping, had been prescribed nicotine replacement but keeps forgetting to use it. Did think it was helping when she was using it.     She wanted to discuss medications for weight loss. Has lost some weight since quitting drinking and trying to eat healthier, but really struggling with appetite.    Medications:  Current Outpatient Medications on File Prior to Visit   Medication Sig Dispense Refill    albuterol (PROVENTIL/VENTOLIN HFA) 90 mcg/actuation inhaler Inhale 2 puffs into the lungs every 6 (six) hours as needed for Wheezing. Rescue 18 g 11    ergocalciferol (ERGOCALCIFEROL) 50,000 unit Cap Take 1 capsule (50,000 Units total) by mouth every 7 days. 12 capsule 3    FLUoxetine 40 MG capsule Take 1 capsule (40 mg total) by mouth once daily. 30 capsule 2    hydroCHLOROthiazide (HYDRODIURIL) 25 MG tablet Take 0.5 tablets (12.5 mg total) by mouth once daily. 90 tablet 3    levothyroxine (SYNTHROID) 50 MCG tablet Take 1 tablet (50 mcg total) by mouth before breakfast. 90 tablet 3    meloxicam (MOBIC) 15 MG tablet TAKE 1 TABLET BY MOUTH EVERY DAY 30 tablet 0    naltrexone (DEPADE) 50 mg tablet Take 50 mg by mouth once daily. 30 tablet 2    nicotine (NICODERM CQ) 21 mg/24 hr Place 1 patch onto the skin once daily. Reapply 21mg patch every day for weeks 1-6. 42 patch 0    rosuvastatin (CRESTOR) 20 MG tablet Take 1 tablet (20 mg total) by mouth once daily. 90 tablet 3    traZODone (DESYREL) 100 MG tablet Take 1 tablet (100 mg total) by mouth every evening. 30 tablet 2    valACYclovir (VALTREX) 1000 MG tablet Take 2 tablets (2,000 mg total) by mouth 2 (two) times daily. for 1 day 8 tablet 5     Current Facility-Administered Medications on File Prior to Visit   Medication Dose Route Frequency Provider Last Rate Last Admin    [START ON 5/3/2023] naltrexone microspheres kit SSRR 380 mg  380 mg Intramuscular Q28 Days Bandar  "BERNADINE Sheridan DO           Review of Systems:  Review of Systems   Constitutional:  Positive for appetite change (increased) and fatigue. Negative for chills, fever and unexpected weight change.   Respiratory:  Negative for shortness of breath and wheezing.    Cardiovascular:  Negative for chest pain and leg swelling.   Gastrointestinal:  Negative for diarrhea, nausea and vomiting.   Neurological:  Negative for dizziness, syncope and light-headedness.     Past Medical History:  Past Medical History:   Diagnosis Date    Abdominal mass, left lower quadrant 11/2016    Anxiety disorder 2010    Asthma     Bilateral ovarian cysts     Cancer antigen 125 () elevation 11/2016    High cholesterol     History of hemorrhoids     Incisional abscess     right breast I/D abscess 2012    MTHFR mutation     Thrombocytopenia complicating pregnancy 2010 and 2012       Objective:    Vitals:  Vitals:    05/02/23 0902   BP: 126/74   Resp: 18   Weight: 82.7 kg (182 lb 5.1 oz)   Height: 5' 7" (1.702 m)       Physical Exam  Vitals reviewed.   Constitutional:       General: She is not in acute distress.     Appearance: She is well-developed.   Eyes:      General:         Right eye: No discharge.         Left eye: No discharge.      Conjunctiva/sclera: Conjunctivae normal.   Cardiovascular:      Rate and Rhythm: Normal rate and regular rhythm.   Pulmonary:      Effort: Pulmonary effort is normal. No respiratory distress.   Skin:     General: Skin is warm and dry.   Neurological:      Mental Status: She is alert and oriented to person, place, and time.   Psychiatric:         Behavior: Behavior normal.         Thought Content: Thought content normal.         Judgment: Judgment normal.       Data:  TSH 0.4 last time      Didi Conteh MD  Internal Medicine    "

## 2023-05-02 NOTE — PROGRESS NOTES
Outpatient Psychiatry Followup Visit (DO/MD/NP)    5/3/2023    Assessment - Plan:     Diagnostic Impression     ICD-10-CM ICD-9-CM   1. Recurrent major depressive disorder, in partial remission  F33.41 296.35   2. Generalized anxiety disorder with panic attacks  F41.1 300.02    F41.0 300.01   3. Insomnia, unspecified type  G47.00 780.52   4. Alcohol use disorder in remission  F10.91 305.03   5. Nicotine dependence due to vaping non-tobacco product  F17.200 305.1   6. BMI 28.0-28.9,adult  Z68.28 V85.24      5/3/2023 (2) Ongoing treatment of residual symptoms with some favorable progress.     Medication Management   Chart was reviewed. The risks and benefits of medication were discussed with pt. The treatment plan and followup plan were reviewed with pt. Pt understands to contact clinic if symptoms worsen. Pt understand to call 911 or go to nearest ER for suicidal ideation, intent or plan.   RX History ATARAX/VISTARIL, CELEXA, CLONIDINE, DIPHENHYDRAMINE, EFFEXOR, ELAVIL, GABAPENTIN, NALTREXONE, PROZAC, REMERON, RESTORIL, TRAZODONE, WELLBUTRIN, and ZOLOFT   Current RX Increase PROZAC  No excessive activation [id number], [id number]  Adjustments:  [id number]: Increase to 60mg daily  49PAP2590: Increase to 40mg daily  20LQL0661: Increase to 20mg daily  51RDB0992: Start 10mg daily  Prior to evaluation pt had been taking EFFEXOR and WELLBUTRIN  Reduce TRAZODONE  Adjustments:  2023: Reduce to 50mg nightly  56QQH5558: Increase to 100mg nightly  22FCE5121: Adjust 50mg HS prn sleep  08PQN5273: Start 50mg HS  Prior to evaluation pt had been taking ATARAX  Start VIVITROL 380mg IM q28d  Tolerability established with PO formulation  Administration Lo2023  Discontinue ORAL NALTREXONE;   Medication adjustments:  2023: Replace PO with VIVITROL IM q28d  [id number]: Start 50mg daily  Continue OTC L-METHYLFOLATE  C677T C/T  Titration:  26REW8338: Start 7.5mg daily  Continue NRT 10w taper [id number]   Education & Counseling  "RX administration and adherence   Other Orders Continue psychotherapy   Monitor VITAL SIGNS  Use of: vaping/nicotine  Use of: alcohol  Instruments: PROMIS (A&D), PSS4   RETURN 4 weeks/1 month  for medication management only     Interval History - Review of Systems:     Patient did not display signs of nor endorse symptoms of overt psychosis or acute mood disorder requiring hospitalization during the encounter. Patient denied violent thoughts or suicidal or homicidal ideation, intent, or plan.     Ivory Cade, a 47 y.o. female, presenting for followup visit. Pt is pleasant and cooperative on interview. This visit was done in person in the clinic.    Feels better, "like a different person." Happier. TRAZODONE 100mg too sedating, requests to reduce dose. Discussed reducing back to 50mg. Requests increasing PROZAC, which is reasonable. Would better address anxiety.    Depression in at least partial remission now.    Seeing therapist.    Last drink in FEB2023. First monthly VIVITROL injection today will replace ORAL NALTREXONE.    Vit D level WNL.     Personal Functioning   Sleep Not restorative some nights.   Emotion NEGATIVE BIAS/RUMINATION: improving.  ANHEDONIA/CONTEXT INSENSITIVITY: anhedonia improving.  MOOD DYSREGULATION: crying spells noted.  MOOD DYSREGULATION: irritability improving.   Appetite Overall no concerns, or concerns are minimal.   Stress High.   Anxiety About the same.   Cognition MEMORY: worse than usual.   Interpersonal Functioning   Home stress   Peers about the same   Work about the same     Measurement-Based Care (MBC):     Routine Instruments   PROMIS-ANXIETY Interpretation: T-SCORE 60; MODERATE using 55-60-70 cutoffs. Compared to MODERATE (65) last time, PROMIS-ANXIETY shows NO significant change.   PROMIS-DEPRESSION Interpretation: T-SCORE 54; NONE TO SLIGHT using 55-60-70 cutoffs. Compared to MODERATE (62) last time, PROMIS-DEPRESSION IMPROVED.   PSS4 Interpretation: 11/16; HIGH using " "6-11 cutoffs. 1 PH, 1 LSE. Compared to HIGH 13/16 last time, PSS4 shows NO significant change.   Additional Instruments   N/A       Current Evaluation of Mental Status:     Constitutional       Vitals:    05/03/23 1111   BP: 127/83   Pulse: 76   Weight: 81.7 kg (180 lb 0.1 oz)   Height: 5' 7" (1.702 m)     General:  casual dress, overweight (BMI 25.0 - 29.9)    Psychiatric / Mental Status Examination  1. Appearance: Dress is informal but appropriate. Motor activity normal.  2. Discourse: Clear speech with normal rate and volume. Associations intact. Orderly.  3. Emotional Expression: Somewhat anxious. Affect is appropriate.  4. Perception and Thinking: No hallucinations. No suicidality, no homicidality, delusions, or paranoia.  5. Sensorium: Grossly intact. Able to focus for interview.  6. Memory and Fund of Knowledge: Intact for content of interview.  7. Insight and Judgment: Intact.    Neurological / Musculoskeletal / Osteopathic Structural  Gait, Station:  non-ataxic     Auto-populated chart data omitted from this note for brevity.    Billing Documentation:     Method of Encounter IN PERSON visit at the clinic   Type of Encounter Follow up visit with me   Counseling;  Psychotherapy Not applicable: Not done or UNDER 16 minutes   Counseling;  Tobacco and/or Nicotine Not applicable: Not done or UNDER 3 minutes   Additional Codes and Modifiers 03840: administered and scored more than one psychological or neuropsychological tests (see MBC above) (16+ mins)   Time Remaining Chart/Pt 82847: FOLLOW UP VISIT, Rx mgmt, "Multiple STABLE chronic illnesses"   Total Mins  (5/3/2023) N/A - Not billing for time        Bandar Sheridan DO  Department of Psychiatry    "

## 2023-05-03 ENCOUNTER — OFFICE VISIT (OUTPATIENT)
Dept: PSYCHIATRY | Facility: CLINIC | Age: 48
End: 2023-05-03
Payer: COMMERCIAL

## 2023-05-03 VITALS
SYSTOLIC BLOOD PRESSURE: 127 MMHG | DIASTOLIC BLOOD PRESSURE: 83 MMHG | HEART RATE: 76 BPM | WEIGHT: 180 LBS | BODY MASS INDEX: 28.25 KG/M2 | HEIGHT: 67 IN

## 2023-05-03 DIAGNOSIS — F41.0 GENERALIZED ANXIETY DISORDER WITH PANIC ATTACKS: ICD-10-CM

## 2023-05-03 DIAGNOSIS — F41.1 GENERALIZED ANXIETY DISORDER WITH PANIC ATTACKS: ICD-10-CM

## 2023-05-03 DIAGNOSIS — F33.41 RECURRENT MAJOR DEPRESSIVE DISORDER, IN PARTIAL REMISSION: Primary | ICD-10-CM

## 2023-05-03 DIAGNOSIS — F17.200 NICOTINE DEPENDENCE DUE TO VAPING NON-TOBACCO PRODUCT: ICD-10-CM

## 2023-05-03 DIAGNOSIS — G47.00 INSOMNIA, UNSPECIFIED TYPE: ICD-10-CM

## 2023-05-03 DIAGNOSIS — F10.91 ALCOHOL USE DISORDER IN REMISSION: ICD-10-CM

## 2023-05-03 PROBLEM — E55.9 VITAMIN D INSUFFICIENCY: Status: RESOLVED | Noted: 2022-12-29 | Resolved: 2023-05-03

## 2023-05-03 LAB — TSH SERPL DL<=0.005 MIU/L-ACNC: 0.58 UIU/ML (ref 0.4–4)

## 2023-05-03 PROCEDURE — 99999 PR PBB SHADOW E&M-EST. PATIENT-LVL III: CPT | Mod: PBBFAC,,, | Performed by: STUDENT IN AN ORGANIZED HEALTH CARE EDUCATION/TRAINING PROGRAM

## 2023-05-03 PROCEDURE — 96372 THER/PROPH/DIAG INJ SC/IM: CPT | Mod: S$GLB,,, | Performed by: STUDENT IN AN ORGANIZED HEALTH CARE EDUCATION/TRAINING PROGRAM

## 2023-05-03 PROCEDURE — 96372 PR INJECTION,THERAP/PROPH/DIAG2ST, IM OR SUBCUT: ICD-10-PCS | Mod: S$GLB,,, | Performed by: STUDENT IN AN ORGANIZED HEALTH CARE EDUCATION/TRAINING PROGRAM

## 2023-05-03 PROCEDURE — 96136 PSYCL/NRPSYC TST PHY/QHP 1ST: CPT | Mod: S$GLB,,, | Performed by: STUDENT IN AN ORGANIZED HEALTH CARE EDUCATION/TRAINING PROGRAM

## 2023-05-03 PROCEDURE — 99214 PR OFFICE/OUTPT VISIT, EST, LEVL IV, 30-39 MIN: ICD-10-PCS | Mod: 25,S$GLB,, | Performed by: STUDENT IN AN ORGANIZED HEALTH CARE EDUCATION/TRAINING PROGRAM

## 2023-05-03 PROCEDURE — 96136 PR PSYCH/NEUROPSYCH TEST ADMIN/SCORING, 2+ TESTS, 1ST 30 MIN: ICD-10-PCS | Mod: S$GLB,,, | Performed by: STUDENT IN AN ORGANIZED HEALTH CARE EDUCATION/TRAINING PROGRAM

## 2023-05-03 PROCEDURE — 99999 PR PBB SHADOW E&M-EST. PATIENT-LVL III: ICD-10-PCS | Mod: PBBFAC,,, | Performed by: STUDENT IN AN ORGANIZED HEALTH CARE EDUCATION/TRAINING PROGRAM

## 2023-05-03 PROCEDURE — 99214 OFFICE O/P EST MOD 30 MIN: CPT | Mod: 25,S$GLB,, | Performed by: STUDENT IN AN ORGANIZED HEALTH CARE EDUCATION/TRAINING PROGRAM

## 2023-05-03 RX ORDER — FLUOXETINE HYDROCHLORIDE 20 MG/1
CAPSULE ORAL
Qty: 30 CAPSULE | Refills: 1 | Status: SHIPPED | OUTPATIENT
Start: 2023-05-03 | End: 2023-05-31 | Stop reason: SDUPTHER

## 2023-05-03 RX ORDER — TRAZODONE HYDROCHLORIDE 50 MG/1
50 TABLET ORAL NIGHTLY
Qty: 30 TABLET | Refills: 1 | Status: SHIPPED | OUTPATIENT
Start: 2023-05-03 | End: 2023-05-31 | Stop reason: SDUPTHER

## 2023-05-03 RX ORDER — FLUOXETINE HYDROCHLORIDE 40 MG/1
CAPSULE ORAL
Qty: 30 CAPSULE | Refills: 1 | Status: SHIPPED | OUTPATIENT
Start: 2023-05-03 | End: 2023-05-31 | Stop reason: SDUPTHER

## 2023-05-03 NOTE — PATIENT INSTRUCTIONS
Increase PROZAC to 60mg daily  Reduce TRAZODONE from 100mg NIGHTLY to 50mg NIGHTLY  Discontinue ORAL NALTREXONE  Start monthly VIVITROL injections - first dose today  Continue OTC L-METHYLFOLATE

## 2023-05-03 NOTE — PROGRESS NOTES
Administered Vivitrol 380 mg   IM.   NDC- 94577-969-62  LOT- [id number]  Exp- 2025  Patient tolerated well. No bleeding at insertion site noted. Pain scale 0/10 with injection. 2 patient identifiers used (name, ), aseptic technique maintained.

## 2023-05-04 ENCOUNTER — TELEPHONE (OUTPATIENT)
Dept: PSYCHIATRY | Facility: CLINIC | Age: 48
End: 2023-05-04
Payer: COMMERCIAL

## 2023-05-04 NOTE — TELEPHONE ENCOUNTER
I had a question regarding this patient's [facility] referral #[id number]. The patient is getting Vivitrol IM injection and it looks like some kind of ophthalmic injection was put in the coding. The  referral was put in for vivitrol. Can this be fixed?      TIJ492 - [place] MEDICATION AUTHORIZATIONS      (CPT®) - ME VERTEPORFIN INJ, 0.1MG     92504 (CPT®) - ME INJECT INTRAVITREAL PHARMCOLOGIC     24884 (CPT®) - ME COMPUTERIZED OPHTHALMIC IMAGING OPTIC NERVE

## 2023-05-05 LAB — 1,25(OH)2D3 SERPL-MCNC: 91 PG/ML (ref 20–79)

## 2023-05-06 NOTE — PROGRESS NOTES
High vitamin D level. Notified pt by portal to stop taking vitamin D supplement and to follow up with primary care for further management.

## 2023-05-08 ENCOUNTER — PATIENT MESSAGE (OUTPATIENT)
Dept: FAMILY MEDICINE | Facility: CLINIC | Age: 48
End: 2023-05-08
Payer: COMMERCIAL

## 2023-05-08 NOTE — TELEPHONE ENCOUNTER
Please advise  LOV: 05/02/23  NOV: 5/30/23  Medications have been prepped per approval. Medications are not on medication list, but are in med hx.

## 2023-05-08 NOTE — TELEPHONE ENCOUNTER
No care due was identified.  [facility] Embedded Care Due Messages. Reference number: [id number].   5/08/2023 3:58:43 PM CDT

## 2023-05-09 RX ORDER — HYDROCORTISONE ACETATE 25 MG/1
25 SUPPOSITORY RECTAL 2 TIMES DAILY
Qty: 20 SUPPOSITORY | Refills: 0 | Status: SHIPPED | OUTPATIENT
Start: 2023-05-09 | End: 2023-05-19

## 2023-05-09 RX ORDER — HYDROCORTISONE 25 MG/G
CREAM TOPICAL 2 TIMES DAILY
Qty: 28 G | Refills: 1 | Status: SHIPPED | OUTPATIENT
Start: 2023-05-09

## 2023-05-30 ENCOUNTER — OFFICE VISIT (OUTPATIENT)
Dept: FAMILY MEDICINE | Facility: CLINIC | Age: 48
End: 2023-05-30
Payer: COMMERCIAL

## 2023-05-30 VITALS
SYSTOLIC BLOOD PRESSURE: 122 MMHG | WEIGHT: 169.31 LBS | HEART RATE: 59 BPM | DIASTOLIC BLOOD PRESSURE: 74 MMHG | RESPIRATION RATE: 16 BRPM | OXYGEN SATURATION: 98 % | BODY MASS INDEX: 26.57 KG/M2 | HEIGHT: 67 IN

## 2023-05-30 DIAGNOSIS — I10 ESSENTIAL HYPERTENSION: Primary | ICD-10-CM

## 2023-05-30 DIAGNOSIS — E78.2 MIXED HYPERLIPIDEMIA: ICD-10-CM

## 2023-05-30 DIAGNOSIS — E66.3 OVERWEIGHT: ICD-10-CM

## 2023-05-30 DIAGNOSIS — E03.9 HYPOTHYROIDISM, UNSPECIFIED TYPE: ICD-10-CM

## 2023-05-30 DIAGNOSIS — E55.9 VITAMIN D INSUFFICIENCY: ICD-10-CM

## 2023-05-30 PROCEDURE — 99999 PR PBB SHADOW E&M-EST. PATIENT-LVL IV: CPT | Mod: PBBFAC,,, | Performed by: INTERNAL MEDICINE

## 2023-05-30 PROCEDURE — 99214 PR OFFICE/OUTPT VISIT, EST, LEVL IV, 30-39 MIN: ICD-10-PCS | Mod: S$PBB,,, | Performed by: INTERNAL MEDICINE

## 2023-05-30 PROCEDURE — 99214 OFFICE O/P EST MOD 30 MIN: CPT | Mod: S$PBB,,, | Performed by: INTERNAL MEDICINE

## 2023-05-30 PROCEDURE — 99999 PR PBB SHADOW E&M-EST. PATIENT-LVL IV: ICD-10-PCS | Mod: PBBFAC,,, | Performed by: INTERNAL MEDICINE

## 2023-05-30 RX ORDER — PHENTERMINE HYDROCHLORIDE 37.5 MG/1
37.5 TABLET ORAL
Qty: 30 TABLET | Refills: 1 | Status: SHIPPED | OUTPATIENT
Start: 2023-05-30 | End: 2023-07-26

## 2023-05-30 NOTE — PROGRESS NOTES
Assessment and Plan:    1. Essential hypertension  Controlled without medication currently, will CTM.    2. Hypothyroidism, unspecified type  Continue levothyroxine 25 mcg daily.    3. Vitamin D insufficiency  Stopped ergocalciferol with recent vit D 42, advised to start taking OTC D3 2000 IU daily.    4. Overweight  Can continue for 2 more months, then stop.  - phentermine (ADIPEX-P) 37.5 mg tablet; Take 1 tablet (37.5 mg total) by mouth before breakfast.  Dispense: 30 tablet; Refill: 1    5. Mixed hyperlipidemia  At 6 month follow up, if she has maintained weight loss and diet changes, would like to try coming down to 5 mg crestor.     ______________________________________________________________________  Subjective:    Chief Complaint:  Follow up chronic medical conditions    HPI:  Ivory is a 47 y.o. year old female here to follow up chronic medical conditions    Seeing psychiatry for depression, anxiety, insomnia, and alcohol dependence. She is currently taking fluoxetine 60, depo naltrexone, and trazodone 50 mg nightly. She had been drinking heavily until February of 2023 but has been sober since then.     Steatohepatitis- Since I had last seen her has established with hepatology. Noted to have liver lesions, planning MRI liver later this year.     HLD- Last year LDL and TGs significantly elevated. Improved with stopping drinking and taking crestor. Wants to discuss coming off of this.      Hypothyroidism- Taking levothyroxine 25 mcg daily, recent lab normal.     Vitamin D def- Had been taking ergocalciferol 50K weekly. Most recent vit D 42. Had stopped the ergocalciferol with this and had not yet restarted OTC vit D.     HTN- Had been taking HCTZ 25 mg daily PRN, but stopped this in early 2023 as she had not been needing this at all as BP was getting low. She has been monitoring this for the last month since starting phentermine and this has not been going up.      She is currently vaping, had been prescribed  nicotine replacement but keeps forgetting to use it. Did think it was helping when she was using it. She has the patches now but has not been using them. Did promise her kids she was going to stop this summer.      Obesity- Started phentermine 1 month ago. BP has been normal with this and HR not elevated. Reports that this has been working well. Having some minor GI symptoms, but not truly constipated. Would like to continue this.      Medications:  Current Outpatient Medications on File Prior to Visit   Medication Sig Dispense Refill    albuterol (PROVENTIL/VENTOLIN HFA) 90 mcg/actuation inhaler Inhale 2 puffs into the lungs every 6 (six) hours as needed for Wheezing. Rescue 18 g 11    FLUoxetine 20 MG capsule Take a 40mg cap with a 20mg cap (60mg total) daily. 30 capsule 1    FLUoxetine 40 MG capsule Take a 40mg cap with a 20mg cap (60mg total) daily. 30 capsule 1    hydrocortisone 2.5 % cream Apply topically 2 (two) times daily. 28 g 1    levothyroxine (SYNTHROID) 50 MCG tablet Take 1 tablet (50 mcg total) by mouth before breakfast. 90 tablet 3    meloxicam (MOBIC) 15 MG tablet TAKE 1 TABLET BY MOUTH EVERY DAY 30 tablet 0    phentermine (ADIPEX-P) 37.5 mg tablet Take 0.5 tablets (18.75 mg total) by mouth before breakfast. 15 tablet 0    rosuvastatin (CRESTOR) 20 MG tablet Take 1 tablet (20 mg total) by mouth once daily. 90 tablet 3    traZODone (DESYREL) 50 MG tablet Take 1 tablet (50 mg total) by mouth every evening. 30 tablet 1    ergocalciferol (ERGOCALCIFEROL) 50,000 unit Cap Take 1 capsule (50,000 Units total) by mouth every 7 days. (Patient not taking: Reported on 5/30/2023) 12 capsule 3    valACYclovir (VALTREX) 1000 MG tablet Take 2 tablets (2,000 mg total) by mouth 2 (two) times daily. for 1 day 8 tablet 5     Current Facility-Administered Medications on File Prior to Visit   Medication Dose Route Frequency Provider Last Rate Last Admin    naltrexone microspheres kit SSRR 380 mg  380 mg Intramuscular Q28  "Days Bandar Sheridan, DO   380 mg at 05/03/23 1142       Review of Systems:  Review of Systems   Constitutional:  Positive for appetite change. Negative for unexpected weight change.   Neurological:  Negative for dizziness, syncope and light-headedness.     Past Medical History:  Past Medical History:   Diagnosis Date    Abdominal mass, left lower quadrant 11/2016    Anxiety disorder 2010    Asthma     Bilateral ovarian cysts     BMI 30.0-30.9,adult 2/2/2023    Cancer antigen 125 () elevation 11/2016    High cholesterol     History of hemorrhoids     Incisional abscess     right breast I/D abscess 2012    MTHFR mutation     Thrombocytopenia complicating pregnancy 2010 and 2012    Vitamin D insufficiency 12/29/2022       Objective:    Vitals:  Vitals:    05/30/23 0907   BP: 122/74   Pulse: (!) 59   Resp: 16   SpO2: 98%   Weight: 76.8 kg (169 lb 5 oz)   Height: 5' 7" (1.702 m)       Physical Exam  Vitals reviewed.   Constitutional:       General: She is not in acute distress.     Appearance: She is well-developed.   Eyes:      General:         Right eye: No discharge.         Left eye: No discharge.      Conjunctiva/sclera: Conjunctivae normal.   Cardiovascular:      Rate and Rhythm: Normal rate and regular rhythm.   Pulmonary:      Effort: Pulmonary effort is normal. No respiratory distress.   Skin:     General: Skin is warm and dry.   Neurological:      Mental Status: She is alert and oriented to person, place, and time.   Psychiatric:         Behavior: Behavior normal.         Thought Content: Thought content normal.         Judgment: Judgment normal.       Data:  PDMP reviewed, last filled phentermine 5/2/23      Didi Conteh MD  Internal Medicine    "

## 2023-05-30 NOTE — PROGRESS NOTES
Outpatient Psychiatry Followup Visit (DO/MD/NP)    2023    Assessment - Plan:     Diagnostic Impression     ICD-10-CM ICD-9-CM   1. Recurrent major depressive disorder, in partial remission  F33.41 296.35   2. Generalized anxiety disorder with panic attacks  F41.1 300.02    F41.0 300.01   3. Insomnia, unspecified type  G47.00 780.52   4. Alcohol use disorder in remission  F10.91 305.03   5. Nicotine dependence due to vaping non-tobacco product  F17.200 305.1   6. BMI 26.0-26.9,adult  Z68.26 V85.22      2023 (1) Overall condition of patient is stable; focus is on recovery and relapse prevention.     Medication Management   Chart was reviewed. The risks and benefits of medication were discussed with pt. The treatment plan and followup plan were reviewed with pt. Pt understands to contact clinic if symptoms worsen. Pt understands to call 911 or go to nearest ER for suicidal ideation, intent or plan.   RX History ATARAX/VISTARIL, CELEXA, CLONIDINE, DIPHENHYDRAMINE, EFFEXOR, ELAVIL, GABAPENTIN, NALTREXONE, PROZAC, REMERON, RESTORIL, TRAZODONE, WELLBUTRIN, and ZOLOFT   Current RX Continue PROZAC  No excessive activation [id number], 2023  In  consider reduction of dose  Adjustments:  2023: Increase to 60mg daily  93IQM4414: Increase to 40mg daily  13TRU3220: Increase to 20mg daily  59LIV4466: Start 10mg daily  Prior to evaluation pt had been taking EFFEXOR and WELLBUTRIN  Continue TRAZODONE  Adjustments:  2023: Reduce to 50mg nightly  15YRJ6503: Increase to 100mg nightly  16MQI9750: Adjust 50mg HS prn sleep  11NHY4895: Start 50mg HS  Prior to evaluation pt had been taking ATARAX  Continue VIVITROL 380mg IM q28d  Tolerability established with PO formulation  Administration Lo2023  Continue OTC L-METHYLFOLATE  C677T C/T  Titration:  [id number]: Start 7.5mg daily  Continue NRT 10w taper [id number]   Education & Counseling RX administration and adherence   Other Orders  Continue individual psychotherapy   Monitor VITAL SIGNS  Use of: tobacco/nicotine  Use of: alcohol  Instruments: PROMIS (A&D), PSS4   RETURN T: RETURN IN 8 WEEKS (TWO MONTHS), and reassess frequency next visit  Continue medication management.     Interval History - Review of Systems:     Available documentation has been reviewed, and pertinent elements of the chart have been incorporated into this note where appropriate.    Pt's last Epic encounter with writer was on: 5/3/2023    Pt's last Epic encounter in the psychiatry department was on: 5/3/2023  Pt's first Epic encounter in the psychiatry department was on: 12/29/2022      Ivory Cade, a 47 y.o. female, presenting for followup visit. Pt is pleasant and cooperative on interview. This visit was done in person in the clinic.    VIVITROL injection today. Had a lump.    Losing weight from PHENTERMINE (BMI from 28s to now 26s). Will continue taking for about 2 months. Pt is eager to take fewer medications. Discussed plan to try reduction in NOV2023    No concerns today. Would like to continue medications as prescribed.    Sees therapist.     Patient did not display signs of nor endorse symptoms of overt psychosis or acute mood disorder requiring hospitalization during the encounter. Patient denied violent thoughts or suicidal or homicidal ideation, intent, or plan.   Personal Functioning   Sleep No concerns.   Emotion Overall no concerns, or concerns are minimal.   Appetite Overall no concerns, or concerns are minimal.   Stress About the same.   Anxiety Overall no concerns, or concerns are minimal.   Cognition Overall no concerns.   Interpersonal Functioning   Home parenting stress   Peers no concerns identified   Work stress     Measurement-Based Care (MBC):     Routine Instruments   PROMIS-ANXIETY Interpretation: T-SCORE 58; MILD using 55-60-70 cutoffs. Compared to MODERATE (60) last time, PROMIS-ANXIETY IMPROVED.   PROMIS-DEPRESSION Interpretation: T-SCORE  "51; NONE TO SLIGHT using 55-60-70 cutoffs. Compared to NONE TO SLIGHT (54) last time, PROMIS-DEPRESSION is relatively stable.   PSS4 Interpretation: 8/16; MODERATE using 6-11 cutoffs. 1 PH, 0 LSE. Compared to HIGH 11/16 last time, PSS4 IMPROVED.   Additional Instruments   N/A     Current Evaluation of Mental Status:     Constitutional       Vitals:    05/31/23 1045   BP: 135/89   Pulse: 61   Weight: 78.1 kg (172 lb 2.9 oz)   Height: 5' 7" (1.702 m)     General:  casual dress, overweight (BMI 25.0 - 29.9)    Psychiatric / Mental Status Examination  1. Appearance: Dress is informal but appropriate. Motor activity normal.  2. Discourse: Clear speech with normal rate and volume. Associations intact. Orderly.  3. Emotional Expression: Euthymic mood with appropriate affect.  4. Perception and Thinking: No hallucinations. No suicidality, no homicidality, delusions, or paranoia.  5. Sensorium: Grossly intact. Able to focus for interview.  6. Memory and Fund of Knowledge: Intact for content of interview.  7. Insight and Judgment: Intact.    Neurological / Musculoskeletal / Osteopathic Structural  Gait, Station:  Non-ataxic gait     Auto-populated chart data omitted from this note for brevity.    Billing Documentation:     Method of Encounter IN PERSON visit at the clinic   Type of Encounter Follow up visit with me   Counseling;  Psychotherapy Not applicable: Not done or UNDER 16 minutes   Counseling;  Tobacco and/or Nicotine Not applicable: Not done or UNDER 3 minutes   Additional Codes and Modifiers 29808, with modifer 59: administered and scored more than one psychological or neuropsychological tests (see MBC above) (16+ mins)   Time Remaining Chart/Pt 86819: FOLLOW UP VISIT, Rx mgmt, "Multiple STABLE chronic illnesses; no changes in treatment at this time"   Total Mins  (5/31/2023) N/A - Not billing for time        Bandar Sheridan DO  Department of Psychiatry        "

## 2023-05-31 ENCOUNTER — OFFICE VISIT (OUTPATIENT)
Dept: PSYCHIATRY | Facility: CLINIC | Age: 48
End: 2023-05-31
Payer: COMMERCIAL

## 2023-05-31 VITALS
HEART RATE: 61 BPM | DIASTOLIC BLOOD PRESSURE: 89 MMHG | HEIGHT: 67 IN | BODY MASS INDEX: 27.02 KG/M2 | WEIGHT: 172.19 LBS | SYSTOLIC BLOOD PRESSURE: 135 MMHG

## 2023-05-31 DIAGNOSIS — F33.41 RECURRENT MAJOR DEPRESSIVE DISORDER, IN PARTIAL REMISSION: Primary | ICD-10-CM

## 2023-05-31 DIAGNOSIS — G47.00 INSOMNIA, UNSPECIFIED TYPE: ICD-10-CM

## 2023-05-31 DIAGNOSIS — F41.1 GENERALIZED ANXIETY DISORDER WITH PANIC ATTACKS: ICD-10-CM

## 2023-05-31 DIAGNOSIS — F41.0 GENERALIZED ANXIETY DISORDER WITH PANIC ATTACKS: ICD-10-CM

## 2023-05-31 DIAGNOSIS — F17.200 NICOTINE DEPENDENCE DUE TO VAPING NON-TOBACCO PRODUCT: ICD-10-CM

## 2023-05-31 DIAGNOSIS — F10.91 ALCOHOL USE DISORDER IN REMISSION: ICD-10-CM

## 2023-05-31 PROBLEM — F33.0 MILD EPISODE OF RECURRENT MAJOR DEPRESSIVE DISORDER: Status: RESOLVED | Noted: 2017-11-16 | Resolved: 2023-05-31

## 2023-05-31 PROCEDURE — 96136 PSYCL/NRPSYC TST PHY/QHP 1ST: CPT | Mod: 59,S$GLB,, | Performed by: STUDENT IN AN ORGANIZED HEALTH CARE EDUCATION/TRAINING PROGRAM

## 2023-05-31 PROCEDURE — 96372 PR INJECTION,THERAP/PROPH/DIAG2ST, IM OR SUBCUT: ICD-10-PCS | Mod: S$GLB,,, | Performed by: STUDENT IN AN ORGANIZED HEALTH CARE EDUCATION/TRAINING PROGRAM

## 2023-05-31 PROCEDURE — 99214 PR OFFICE/OUTPT VISIT, EST, LEVL IV, 30-39 MIN: ICD-10-PCS | Mod: 25,S$GLB,, | Performed by: STUDENT IN AN ORGANIZED HEALTH CARE EDUCATION/TRAINING PROGRAM

## 2023-05-31 PROCEDURE — 99214 OFFICE O/P EST MOD 30 MIN: CPT | Mod: 25,S$GLB,, | Performed by: STUDENT IN AN ORGANIZED HEALTH CARE EDUCATION/TRAINING PROGRAM

## 2023-05-31 PROCEDURE — 96136 PR PSYCH/NEUROPSYCH TEST ADMIN/SCORING, 2+ TESTS, 1ST 30 MIN: ICD-10-PCS | Mod: 59,S$GLB,, | Performed by: STUDENT IN AN ORGANIZED HEALTH CARE EDUCATION/TRAINING PROGRAM

## 2023-05-31 PROCEDURE — 96372 THER/PROPH/DIAG INJ SC/IM: CPT | Mod: S$GLB,,, | Performed by: STUDENT IN AN ORGANIZED HEALTH CARE EDUCATION/TRAINING PROGRAM

## 2023-05-31 PROCEDURE — 99999 PR PBB SHADOW E&M-EST. PATIENT-LVL III: ICD-10-PCS | Mod: PBBFAC,,, | Performed by: STUDENT IN AN ORGANIZED HEALTH CARE EDUCATION/TRAINING PROGRAM

## 2023-05-31 PROCEDURE — 99999 PR PBB SHADOW E&M-EST. PATIENT-LVL III: CPT | Mod: PBBFAC,,, | Performed by: STUDENT IN AN ORGANIZED HEALTH CARE EDUCATION/TRAINING PROGRAM

## 2023-05-31 RX ORDER — FLUOXETINE HYDROCHLORIDE 20 MG/1
CAPSULE ORAL
Qty: 30 CAPSULE | Refills: 1 | Status: SHIPPED | OUTPATIENT
Start: 2023-05-31 | End: 2023-07-26 | Stop reason: DRUGHIGH

## 2023-05-31 RX ORDER — FLUOXETINE HYDROCHLORIDE 40 MG/1
CAPSULE ORAL
Qty: 30 CAPSULE | Refills: 1 | Status: SHIPPED | OUTPATIENT
Start: 2023-05-31 | End: 2023-07-26 | Stop reason: DRUGHIGH

## 2023-05-31 RX ORDER — TRAZODONE HYDROCHLORIDE 50 MG/1
50 TABLET ORAL NIGHTLY
Qty: 30 TABLET | Refills: 1 | Status: SHIPPED | OUTPATIENT
Start: 2023-05-31 | End: 2023-07-26 | Stop reason: SDUPTHER

## 2023-05-31 NOTE — PROGRESS NOTES
Administered vivitrol 380mg  IM.   NDC- 77516-277-01  LOT- 2022-3030T  Exp- [id number]  Patient tolerated well. No bleeding at insertion site noted. Pain scale 0/10 with injection. 2 patient identifiers used (name, ), aseptic technique maintained.

## 2023-05-31 NOTE — PATIENT INSTRUCTIONS
Keep appointments with therapist  Keep nurse appointments for injections  Continue medications as prescribed.

## 2023-06-07 RX ORDER — MELOXICAM 15 MG/1
TABLET ORAL
Qty: 30 TABLET | Refills: 0 | Status: SHIPPED | OUTPATIENT
Start: 2023-06-07 | End: 2023-09-21

## 2023-06-08 ENCOUNTER — OFFICE VISIT (OUTPATIENT)
Dept: PSYCHIATRY | Facility: CLINIC | Age: 48
End: 2023-06-08
Payer: COMMERCIAL

## 2023-06-08 DIAGNOSIS — R41.840 ATTENTION AND CONCENTRATION DEFICIT: ICD-10-CM

## 2023-06-08 DIAGNOSIS — F41.1 GENERALIZED ANXIETY DISORDER WITH PANIC ATTACKS: Primary | ICD-10-CM

## 2023-06-08 DIAGNOSIS — F41.0 GENERALIZED ANXIETY DISORDER WITH PANIC ATTACKS: Primary | ICD-10-CM

## 2023-06-08 PROCEDURE — 99999 PR PBB SHADOW E&M-EST. PATIENT-LVL II: CPT | Mod: PBBFAC,,, | Performed by: SOCIAL WORKER

## 2023-06-08 PROCEDURE — 90834 PSYTX W PT 45 MINUTES: CPT | Mod: S$GLB,,, | Performed by: SOCIAL WORKER

## 2023-06-08 PROCEDURE — 90834 PR PSYCHOTHERAPY W/PATIENT, 45 MIN: ICD-10-PCS | Mod: S$GLB,,, | Performed by: SOCIAL WORKER

## 2023-06-08 PROCEDURE — 99999 PR PBB SHADOW E&M-EST. PATIENT-LVL II: ICD-10-PCS | Mod: PBBFAC,,, | Performed by: SOCIAL WORKER

## 2023-06-12 NOTE — PROGRESS NOTES
Individual Psychotherapy (PhD/LCSW)    6/8/2023    Site:  [facility]         Therapeutic Intervention: Met with patient.  Outpatient - Insight oriented psychotherapy 45 min - CPT code 21974, Outpatient - Behavior modifying psychotherapy 45 min - CPT code 99560, and Outpatient - Supportive psychotherapy 45 min - CPT Code 68623    Chief complaint/reason for encounter: depression and anxiety             Interval history and content of current session: Ct was referred to tx by Dr Sheridan to address AUD, depression, and anxiety.  She reported that she is still abstinent from alcohol. She reported having issues with focusing and concentration. SW and ct processed anixety and how it could impact ability to focus. Ct shared that she had a friend come to help her get her house in order. She shared that she went to visit her mom and step dad and the trip caused concern for her mom's wellness. SW provided ct with info regarding APS. She shared that she will look into it and that she may have to bring her mother to live with her in the future. SW and ct discussed the importance of ct doing things for self care and things that ct could do to manage anxiety and self soothe.      Treatment plan:  Target symptoms: depression, anxiety   Why chosen therapy is appropriate versus another modality: relevant to diagnosis, patient responds to this modality, evidence based practice  Outcome monitoring methods: self-report  Therapeutic intervention type: insight oriented psychotherapy, behavior modifying psychotherapy, supportive psychotherapy    Risk parameters:  Patient reports no suicidal ideation  Patient reports no homicidal ideation  Patient reports no self-injurious behavior  Patient reports no violent behavior    CSSRS was completed: No risk    Mental Status Exam:  General Appearance:  unremarkable, age appropriate   Speech: normal tone, normal rate, normal pitch, normal volume      Level of Cooperation: cooperative      Thought  Processes: Difficulty focusing   Mood: steady      Thought Content: normal, no suicidality, no homicidality, delusions, or paranoia   Affect: appropriate   Orientation: Oriented x3   Memory: recent >  intact   Attention Span & Concentration: intact   Fund of General Knowledge: intact and appropriate to age and level of education   Abstract Reasoning: interpretation of similarities was abstract   Judgment & Insight: fair, intact     Language intact       Verbal deficits: None    Patient's response to intervention:  The patient's response to intervention is accepting.    Progress toward goals and other mental status changes:  The patient's progress toward goals is fair , some improvement .    Diagnosis:     ICD-10-CM ICD-9-CM   1. Generalized anxiety disorder with panic attacks  F41.1 300.02    F41.0 300.01   2. Attention and concentration deficit  R41.840 799.51       Plan:  individual psychotherapy and ct to follow up with Dr. Sheridan for psych med mgt  Pt to go to ED or call 911 if symptoms worsen or if she has thoughts of harming self and/or others. Pt verbalized understanding.    Return to clinic: as scheduled    Length of Service (minutes): 45      Each patient to whom he or she provides medical services by telemedicine is: (1) informed of the relationship between the physician and patient and the respective role of any other health care provider with respect to management of the patient; and (2) notified that he or she may decline to receive medical services by telemedicine and may withdraw from such care at any time.

## 2023-06-22 ENCOUNTER — OFFICE VISIT (OUTPATIENT)
Dept: PSYCHIATRY | Facility: CLINIC | Age: 48
End: 2023-06-22
Payer: COMMERCIAL

## 2023-06-22 DIAGNOSIS — R41.840 ATTENTION AND CONCENTRATION DEFICIT: ICD-10-CM

## 2023-06-22 DIAGNOSIS — F41.0 GENERALIZED ANXIETY DISORDER WITH PANIC ATTACKS: Primary | ICD-10-CM

## 2023-06-22 DIAGNOSIS — F33.41 RECURRENT MAJOR DEPRESSIVE DISORDER, IN PARTIAL REMISSION: ICD-10-CM

## 2023-06-22 DIAGNOSIS — F41.1 GENERALIZED ANXIETY DISORDER WITH PANIC ATTACKS: Primary | ICD-10-CM

## 2023-06-22 PROCEDURE — 90834 PR PSYCHOTHERAPY W/PATIENT, 45 MIN: ICD-10-PCS | Mod: S$GLB,,, | Performed by: SOCIAL WORKER

## 2023-06-22 PROCEDURE — 99999 PR PBB SHADOW E&M-EST. PATIENT-LVL I: ICD-10-PCS | Mod: PBBFAC,,, | Performed by: SOCIAL WORKER

## 2023-06-22 PROCEDURE — 99999 PR PBB SHADOW E&M-EST. PATIENT-LVL I: CPT | Mod: PBBFAC,,, | Performed by: SOCIAL WORKER

## 2023-06-22 PROCEDURE — 90834 PSYTX W PT 45 MINUTES: CPT | Mod: S$GLB,,, | Performed by: SOCIAL WORKER

## 2023-06-28 ENCOUNTER — PATIENT MESSAGE (OUTPATIENT)
Dept: ADMINISTRATIVE | Facility: HOSPITAL | Age: 48
End: 2023-06-28
Payer: COMMERCIAL

## 2023-06-28 ENCOUNTER — CLINICAL SUPPORT (OUTPATIENT)
Dept: PSYCHIATRY | Facility: CLINIC | Age: 48
End: 2023-06-28
Payer: COMMERCIAL

## 2023-06-28 DIAGNOSIS — F10.91 ALCOHOL USE DISORDER IN REMISSION: Primary | ICD-10-CM

## 2023-06-28 PROCEDURE — 96372 THER/PROPH/DIAG INJ SC/IM: CPT | Mod: S$GLB,,, | Performed by: STUDENT IN AN ORGANIZED HEALTH CARE EDUCATION/TRAINING PROGRAM

## 2023-06-28 PROCEDURE — 96372 PR INJECTION,THERAP/PROPH/DIAG2ST, IM OR SUBCUT: ICD-10-PCS | Mod: S$GLB,,, | Performed by: STUDENT IN AN ORGANIZED HEALTH CARE EDUCATION/TRAINING PROGRAM

## 2023-06-28 NOTE — PROGRESS NOTES
"Individual Psychotherapy (PhD/LCSW)    6/22/2023    Site:  [facility]         Therapeutic Intervention: Met with patient.  Outpatient - Insight oriented psychotherapy 45 min - CPT code 62574, Outpatient - Behavior modifying psychotherapy 45 min - CPT code 08276, and Outpatient - Supportive psychotherapy 45 min - CPT Code 84699    Chief complaint/reason for encounter: depression and anxiety             Interval history and content of current session: Ct was referred to tx by Dr Sheridan to address AUD, depression, and anxiety. Ct arrived to session and was fully engaged. Ct shared that she had a blow up with her daughters and that it bothered her because she did not want to have another " episode" like the event that lead her to tx. SW and ct processed the importance of ct gauging her anger and taking necessary steps to maintain her composure. Ct has 2 teenaged daughters. SW and ct processed the importance of ct recognizing some of the challenges of being a single mom, working full time and raising 2 children. SW and ct processed the importance of ct recognzing the need for self care and acceptance. SW provided ct with podcasts to listen to to help reinforce using coping skills. Ct to continue in 1:1 sessions.        Treatment plan:  Target symptoms: depression, anxiety   Why chosen therapy is appropriate versus another modality: relevant to diagnosis, patient responds to this modality, evidence based practice  Outcome monitoring methods: self-report  Therapeutic intervention type: insight oriented psychotherapy, behavior modifying psychotherapy, supportive psychotherapy    Risk parameters:  Patient reports no suicidal ideation  Patient reports no homicidal ideation  Patient reports no self-injurious behavior  Patient reports no violent behavior    CSSRS was completed:     Mental Status Exam:  General Appearance:  unremarkable, age appropriate   Speech: normal tone, normal rate, normal pitch, normal volume      Level of " Cooperation: cooperative      Thought Processes: normal and logical   Mood: euthymic      Thought Content: normal, no suicidality, no homicidality, delusions, or paranoia   Affect: congruent and appropriate   Orientation: Oriented x3   Memory: recent >  intact   Attention Span & Concentration: intact   Fund of General Knowledge: intact and appropriate to age and level of education   Abstract Reasoning: interpretation of similarities was abstract   Judgment & Insight: intact     Language intact       Verbal deficits: None    Patient's response to intervention:  The patient's response to intervention is accepting.    Progress toward goals and other mental status changes:  The patient's progress toward goals is fair , some improvement .    Diagnosis:     ICD-10-CM ICD-9-CM   1. Generalized anxiety disorder with panic attacks  F41.1 300.02    F41.0 300.01   2. Attention and concentration deficit  R41.840 799.51   3. Recurrent major depressive disorder, in partial remission  F33.41 296.35       Plan:  individual psychotherapy and ct to follow up with DR. Sheridan for psych med mgt  Pt to go to ED or call 911 if symptoms worsen or if she has thoughts of harming self and/or others. Pt verbalized understanding.    Return to clinic: as scheduled    Length of Service (minutes): 45      Each patient to whom he or she provides medical services by telemedicine is: (1) informed of the relationship between the physician and patient and the respective role of any other health care provider with respect to management of the patient; and (2) notified that he or she may decline to receive medical services by telemedicine and may withdraw from such care at any time.

## 2023-06-28 NOTE — PROGRESS NOTES
Administered vivitrol  IM.   NDC- 84062-975-41  LOT- [id number]  Exp- 2025  Patient tolerated well. No bleeding at insertion site noted. Pain scale 0/10 with injection. 2 patient identifiers used (name, ), aseptic technique maintained.

## 2023-07-05 ENCOUNTER — OFFICE VISIT (OUTPATIENT)
Dept: URGENT CARE | Facility: CLINIC | Age: 48
End: 2023-07-05
Payer: COMMERCIAL

## 2023-07-05 VITALS
OXYGEN SATURATION: 100 % | WEIGHT: 172 LBS | HEIGHT: 67 IN | RESPIRATION RATE: 17 BRPM | TEMPERATURE: 97 F | SYSTOLIC BLOOD PRESSURE: 114 MMHG | DIASTOLIC BLOOD PRESSURE: 78 MMHG | BODY MASS INDEX: 27 KG/M2 | HEART RATE: 81 BPM

## 2023-07-05 DIAGNOSIS — U07.1 COVID-19 VIRUS INFECTION: Primary | ICD-10-CM

## 2023-07-05 DIAGNOSIS — R05.9 COUGH, UNSPECIFIED TYPE: ICD-10-CM

## 2023-07-05 LAB
CTP QC/QA: YES
SARS-COV-2 AG RESP QL IA.RAPID: POSITIVE

## 2023-07-05 PROCEDURE — 87811 SARS CORONAVIRUS 2 ANTIGEN POCT, MANUAL READ: ICD-10-PCS | Mod: QW,S$GLB,, | Performed by: PHYSICIAN ASSISTANT

## 2023-07-05 PROCEDURE — 99213 OFFICE O/P EST LOW 20 MIN: CPT | Mod: S$GLB,,, | Performed by: PHYSICIAN ASSISTANT

## 2023-07-05 PROCEDURE — 87811 SARS-COV-2 COVID19 W/OPTIC: CPT | Mod: QW,S$GLB,, | Performed by: PHYSICIAN ASSISTANT

## 2023-07-05 PROCEDURE — 99213 PR OFFICE/OUTPT VISIT, EST, LEVL III, 20-29 MIN: ICD-10-PCS | Mod: S$GLB,,, | Performed by: PHYSICIAN ASSISTANT

## 2023-07-05 NOTE — LETTER
[address] [address], [address] ? [place] [address] ? Phone [phone] ? Fax [phone]           Return to Work/School  Patient: Ivory Cade  YOB: 1975   Date: 07/05/2023      To Whom It May Concern:  Ivory Cade was in contact with/seen in my office on 07/05/2023.     Return to Work/School Protocol & Process for Employee/Student with symptoms concerning for COVID-19   The below are simply guidelines of our health system for our employees/students --- Please note that your Employer/School may have different criteria for timeline to return to work/school.    --IF test results are POSITIVE, please exclude employee/student for days missed from work/school until:   At least 24 hours fever-free without the use of fever-reducing medications AND    Improvement in symptoms (e.g., cough, shortness of breath, fatigue, GI symptoms, etc.); AND   At least 5 days have passed since symptoms first appeared.  **Per the CDC guidelines, once your 5 days have passed, and you have not had fever greater than 100.4F in the last 24 hours without taking any fever reducers such as Tylenol (Acetaminophen) or Motrin (Ibuprofen) and improvement in symptoms, you may return to your normal activities including social distancing, wearing masks, and frequent handwashing - **YOU DO NOT NEED ANOTHER TEST IN ORDER TO END YOUR QUARANTINE.**     If you have any questions or concerns, or if I can be of further assistance, please do not hesitate to contact me.     Sincerely,      Radha Daniels PA-C

## 2023-07-05 NOTE — PROGRESS NOTES
"Subjective:      Patient ID: Ivory Cade is a 48 y.o. female.    Vitals:  height is 5' 7" (1.702 m) and weight is 78 kg (172 lb). Her temperature is 97 °F (36.1 °C). Her blood pressure is 114/78 and her pulse is 81. Her respiration is 17 and oxygen saturation is 100%.     Chief Complaint: Cough    Cough  This is a new problem. The current episode started yesterday. The problem has been gradually worsening. The problem occurs every few minutes. The cough is Non-productive. Associated symptoms include headaches, myalgias, nasal congestion, postnasal drip, a sore throat and sweats. Pertinent negatives include no chest pain, chills, ear congestion, ear pain, eye redness, fever, heartburn, hemoptysis, rash, rhinorrhea, shortness of breath, weight loss or wheezing. Associated symptoms comments: chest tightening. Nothing aggravates the symptoms. Risk factors for lung disease include animal exposure. She has tried OTC cough suppressant and steroid inhaler for the symptoms. The treatment provided no relief. Her past medical history is significant for bronchitis. There is no history of asthma, bronchiectasis, COPD, emphysema, environmental allergies or pneumonia.   Constitution: Positive for fatigue. Negative for chills, sweating and fever.   HENT:  Positive for congestion, postnasal drip, sinus pressure and sore throat. Negative for ear pain, drooling, nosebleeds, foreign body in nose, sinus pain, trouble swallowing and voice change.    Neck: Negative for neck pain, neck stiffness, painful lymph nodes and neck swelling.   Cardiovascular:  Negative for chest pain, leg swelling, palpitations, sob on exertion and passing out.   Eyes:  Negative for eye pain, eye redness, photophobia, double vision, blurred vision and eyelid swelling.   Respiratory:  Positive for cough and sputum production. Negative for chest tightness, bloody sputum, shortness of breath, stridor and wheezing.    Gastrointestinal:  Positive for nausea. " Negative for abdominal pain, abdominal bloating, vomiting, constipation, diarrhea and heartburn.   Genitourinary:  Negative for dysuria, flank pain and hematuria.   Musculoskeletal:  Positive for muscle ache. Negative for joint pain, joint swelling, abnormal ROM of joint, back pain and muscle cramps.   Skin:  Negative for rash and hives.   Allergic/Immunologic: Negative for environmental allergies, seasonal allergies, food allergies, hives, itching and sneezing.   Neurological:  Positive for headaches. Negative for dizziness, light-headedness, passing out, facial drooping, speech difficulty, loss of balance, altered mental status, loss of consciousness and seizures.   Hematologic/Lymphatic: Negative for swollen lymph nodes.   Psychiatric/Behavioral:  Negative for altered mental status and nervous/anxious. The patient is not nervous/anxious.     Objective:     Physical Exam   Constitutional: She is oriented to person, place, and time. She appears well-developed. She is cooperative.  Non-toxic appearance. She does not appear ill. No distress.   HENT:   Head: Normocephalic and atraumatic.   Ears:   Right Ear: Hearing, tympanic membrane, external ear and ear canal normal.   Left Ear: Hearing, tympanic membrane, external ear and ear canal normal.   Nose: Mucosal edema and rhinorrhea present. No nasal deformity. No epistaxis. Right sinus exhibits no maxillary sinus tenderness and no frontal sinus tenderness. Left sinus exhibits no maxillary sinus tenderness and no frontal sinus tenderness.   Mouth/Throat: Uvula is midline and mucous membranes are normal. No trismus in the jaw. Normal dentition. No uvula swelling. Posterior oropharyngeal erythema and cobblestoning present. No oropharyngeal exudate, posterior oropharyngeal edema or tonsillar abscesses. No tonsillar exudate.   Eyes: Conjunctivae and lids are normal. No scleral icterus.   Neck: Trachea normal and phonation normal. Neck supple. No edema present. No erythema  present. No neck rigidity present.   Cardiovascular: Normal rate, regular rhythm, normal heart sounds and normal pulses.   Pulmonary/Chest: Effort normal and breath sounds normal. No accessory muscle usage or stridor. No respiratory distress. She has no decreased breath sounds. She has no wheezes. She has no rhonchi. She has no rales.   Abdominal: Normal appearance.   Musculoskeletal: Normal range of motion.         General: No deformity. Normal range of motion.   Lymphadenopathy:     She has no cervical adenopathy.   Neurological: She is alert and oriented to person, place, and time. She exhibits normal muscle tone. Coordination normal.   Skin: Skin is warm, dry, intact, not diaphoretic, not pale and no rash. Capillary refill takes less than 2 seconds.   Psychiatric: Her speech is normal and behavior is normal. Judgment and thought content normal.   Nursing note and vitals reviewed.    Results for orders placed or performed in visit on 07/05/23   SARS Coronavirus 2 Antigen, POCT Manual Read   Result Value Ref Range    SARS Coronavirus 2 Antigen Positive (A) Negative     Acceptable Yes        Assessment:     1. COVID-19 virus infection    2. Cough, unspecified type        Plan:   Discussed COVID-19 results with patient. CDC precautions given to patient. Discussed treatment options and patient deferred Antiviral RX at this time - discussed risks versus benefits, possible DI and side effects. Advised close follow-up with PCP and/or Specialist for further evaluation as needed. Strict ER precautions given to patient as well. Patient aware, verbalized understanding and agreed with plan of care.    COVID-19 virus infection    Cough, unspecified type  -     SARS Coronavirus 2 Antigen, POCT Manual Read      There are no Patient Instructions on file for this visit.

## 2023-07-24 ENCOUNTER — TELEPHONE (OUTPATIENT)
Dept: HEPATOLOGY | Facility: CLINIC | Age: 48
End: 2023-07-24

## 2023-07-24 ENCOUNTER — OFFICE VISIT (OUTPATIENT)
Dept: HEPATOLOGY | Facility: CLINIC | Age: 48
End: 2023-07-24
Payer: COMMERCIAL

## 2023-07-24 DIAGNOSIS — F10.91 ALCOHOL USE DISORDER IN REMISSION: ICD-10-CM

## 2023-07-24 DIAGNOSIS — K76.9 LIVER LESION: ICD-10-CM

## 2023-07-24 DIAGNOSIS — K76.89 FOCAL NODULAR HYPERPLASIA OF LIVER: ICD-10-CM

## 2023-07-24 DIAGNOSIS — K76.0 FATTY LIVER: ICD-10-CM

## 2023-07-24 DIAGNOSIS — Z15.89 HETEROZYGOUS MTHFR MUTATION C677T: Primary | ICD-10-CM

## 2023-07-24 DIAGNOSIS — K76.9 LIVER LESION: Primary | ICD-10-CM

## 2023-07-24 PROCEDURE — 99213 PR OFFICE/OUTPT VISIT, EST, LEVL III, 20-29 MIN: ICD-10-PCS | Mod: 95,,, | Performed by: INTERNAL MEDICINE

## 2023-07-24 PROCEDURE — 99213 OFFICE O/P EST LOW 20 MIN: CPT | Mod: 95,,, | Performed by: INTERNAL MEDICINE

## 2023-07-24 NOTE — PROGRESS NOTES
The patient location is: home  The chief complaint leading to consultation is: f/u liver lesions    Visit type: audiovisual    Face to Face time with patient: 15 minutes  30 minutes of total time spent on the encounter, which includes face to face time and non-face to face time preparing to see the patient (eg, review of tests), Obtaining and/or reviewing separately obtained history, Documenting clinical information in the electronic or other health record, Independently interpreting results (not separately reported) and communicating results to the patient/family/caregiver, or Care coordination (not separately reported).     Each patient to whom he or she provides medical services by telemedicine is:  (1) informed of the relationship between the physician and patient and the respective role of any other health care provider with respect to management of the patient; and (2) notified that he or she may decline to receive medical services by telemedicine and may withdraw from such care at any time.    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 I saw her in consultation 3/30/23:     Labs:  8/24/22: ALT 96, AST 60, ALKP  57, Tbil 0.8  9/1/22: , aST 71, ALKP 67, Tbil 0.9  Stopped drinking  3/2/22: ALT 14, AST 16, ALKP 60, Tbil 0.6 (were also normal prior to 8/24/22)  HCV Ab-,   Alcohol: heavy most of her life-stopped drinking after 9/1/22 labs  BMI: 30.5     Abdo US 9/7/22: Hepatomegaly with increased echotexture to the liver suggestive of hepatic steatosis; Focal hypoechoic area along the undersurface of the left hepatic lobe  that measures approximately 2.5 x 1.9 x 2.6 cm and likely represents sparing of fatty infiltration.  If further evaluation is necessary either a dedicated CT scan of the liver with and without contrast or an MRI of the liver is suggested.  MRI maia jarrett gadavist 3/21/23: The the liver is enlarged and measures 19.7 cm.  The spleen measures 8.8 cm in craniocaudal dimensions.  There is diffuse fatty liver infiltration.  The there is no evidence of biliary ductal dilatation.  Common bile duct is unremarkable.  The pancreatic duct is of normal caliber.  There is a subcapsular hypervascular mass measuring 2.2 x 1.6 cm the in hepatic segment 2.  This is slightly hypointense on T1 weighted sequences and hyperintense on T2 weighted sequences.  On the late imaging this is isointense.  Differential considerations would include focal nodular hyperplasia, adenoma and hepatocellular carcinoma.  There is a 2nd hypervascular focus this measuring 9 mm in the anterior segment right lobe inferiorly (segment 5).  This is also isointense on the portal venous and delayed images.  Seminal to similar diff differential considerations should be considered.  A 3rd hypervascular focus is noted inferior to this in the hepatic segment 6 measuring 7 mm. Impression: There is somewhat heterogeneous fatty liver infiltration and there are 3 hypervascular hepatic mass lesions.  Differential considerations include hepatic adenoma, focal nodular hyperplasia and hepatocellular carcinoma     ---no hx BCP  --colonsocopy 2021- no polyps  --mammogram 09/22- negative     The patient denies any symptoms of decompensated cirrhosis, including no ascites or edema, cognitive problems that would suggest hepatic encephalopathy, or GI bleeding from varices.        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 She has now quit. She has 3 liver lesion on MRI. The etiology is  unclear-adenoma, FNH and HCC are in the differential. There is nothing from hx, PE, labs or imaging that suggests advanced fibrosis/cirrhosis. Absence of advanced fibrosis makes HCC less likely. I am recommending and MRE to screen for fibrosis and an MRI w wo eovist to further evaluate the liver lesions. Eovist is better at distinguishing adenoma vs FNH.    Since Ivory Cade's last visit:  MRE 4/20/23: no excess iron; mild fatty liver; no fibrosis  MRI abdo w wo eovist 4/20/23:  Three hepatic lesions with similar signal/enhancement characteristics noting retained contrast on delayed hepatobiliary phase, findings are suggestive of focal nodular hyperplasia.    Labs 3/2/23: ALT 14. AST 16, ALKP 60, Tbil 0.6  Serologic w/u for CLD: HBsAg-, HBsAb+, HCV aB-, HAV IgG-, EDWIN-, ASMA-, alpha 1 antitrypsin level normal, nomral ceruloplasmin, iron sat 30%  Remains off alcohol  Has lost ~60 lbs   Continues to deny any symptoms of decompensated cirrhosis, including no ascites or edema, cognitive problems that would suggest hepatic encephalopathy, or GI bleeding from varices.     Outpatient Encounter Medications as of 7/24/2023   Medication Sig Dispense Refill    albuterol (PROVENTIL/VENTOLIN HFA) 90 mcg/actuation inhaler Inhale 2 puffs into the lungs every 6 (six) hours as needed for Wheezing. Rescue 18 g 11    ergocalciferol (ERGOCALCIFEROL) 50,000 unit Cap Take 1 capsule (50,000 Units total) by mouth every 7 days. 12 capsule 3    FLUoxetine 20 MG capsule Take a 40mg cap with a 20mg cap (60mg total) daily. 30 capsule 1    FLUoxetine 40 MG capsule Take a 40mg cap with a 20mg cap (60mg total) daily. 30 capsule 1    hydrocortisone 2.5 % cream Apply topically 2 (two) times daily. 28 g 1    levothyroxine (SYNTHROID) 50 MCG tablet Take 1 tablet (50 mcg total) by mouth before breakfast. 90 tablet 3    meloxicam (MOBIC) 15 MG tablet TAKE 1 TABLET BY MOUTH EVERY DAY 30 tablet 0    phentermine (ADIPEX-P) 37.5 mg tablet Take 1 tablet  (37.5 mg total) by mouth before breakfast. 30 tablet 1    rosuvastatin (CRESTOR) 20 MG tablet Take 1 tablet (20 mg total) by mouth once daily. 90 tablet 3    traZODone (DESYREL) 50 MG tablet Take 1 tablet (50 mg total) by mouth every evening. 30 tablet 1    valACYclovir (VALTREX) 1000 MG tablet Take 2 tablets (2,000 mg total) by mouth 2 (two) times daily. for 1 day 8 tablet 5     Facility-Administered Encounter Medications as of 7/24/2023   Medication Dose Route Frequency Provider Last Rate Last Admin    naltrexone microspheres kit SSRR 380 mg  380 mg Intramuscular Q28 Days Bandar Sheridan,    380 mg at 06/28/23 1132     Review of patient's allergies indicates:   Allergen Reactions    Penicillins Hives     Past Medical History:   Diagnosis Date    Abdominal mass, left lower quadrant 11/2016    Anxiety disorder 2010    Asthma     Bilateral ovarian cysts     BMI 30.0-30.9,adult 2/2/2023    Cancer antigen 125 () elevation 11/2016    High cholesterol     History of hemorrhoids     Incisional abscess     right breast I/D abscess 2012    Mild episode of recurrent major depressive disorder 11/16/2017    MTHFR mutation     Thrombocytopenia complicating pregnancy 2010 and 2012    Vitamin D insufficiency 12/29/2022       Review of Systems   Constitutional: Negative.    HENT: Negative.     Eyes: Negative.    Respiratory: Negative.     Cardiovascular: Negative.    Gastrointestinal: Negative.    Genitourinary: Negative.    Musculoskeletal: Negative.    Skin: Negative.    Neurological: Negative.    Psychiatric/Behavioral: Negative.     There were no vitals filed for this visit.    Physical Exam    Computed MELD 3.0 unavailable. Necessary lab results were not found in the last year.  Computed MELD-Na unavailable. Necessary lab results were not found in the last year.      Lab Results   Component Value Date    GLU 90 03/02/2023    BUN 11 03/02/2023    CREATININE 0.8 03/02/2023    CALCIUM 9.8 03/02/2023      03/02/2023    K 3.6 03/02/2023     03/02/2023    PROT 7.1 03/02/2023    CO2 29 03/02/2023    ANIONGAP 8 03/02/2023    WBC 6.62 03/02/2023    RBC 4.27 03/02/2023    HGB 13.1 03/02/2023    HCT 40.7 03/02/2023    MCV 95 03/02/2023    MCH 30.7 03/02/2023    MCHC 32.2 03/02/2023     Lab Results   Component Value Date    RDW 12.5 03/02/2023     03/02/2023    MPV 11.7 03/02/2023    GRAN 4.4 03/02/2023    GRAN 65.7 03/02/2023    LYMPH 1.5 03/02/2023    LYMPH 22.8 03/02/2023    MONO 0.5 03/02/2023    MONO 7.3 03/02/2023    EOSINOPHIL 3.2 03/02/2023    BASOPHIL 0.8 03/02/2023    EOS 0.2 03/02/2023    BASO 0.05 03/02/2023    APTT 31.5 11/25/2016    GROUPTRH A POS 11/25/2016    CHOL 149 03/02/2023    TRIG 68 03/02/2023    HDL 45 03/02/2023    CHOLHDL 30.2 03/02/2023    TOTALCHOLEST 3.3 03/02/2023    ALBUMIN 4.1 03/02/2023    BILIDIR 0.2 09/30/2022    AST 16 03/02/2023    ALT 14 03/02/2023    ALKPHOS 60 03/02/2023    INR 1.0 11/25/2016       Assessment and Plan:  Ivory Cade is a 48 y.o. female with liver lesions that appear to be c/w FNH based on MRI done with eovist. Recommend abdo US limited rather than MRI 10/20/23 (6 months later) to confirm no growth. For fatty liver remain off alcohol; check labs 03/24 (annually) and consider vaccination for HAV.    Return 11/23

## 2023-07-24 NOTE — TELEPHONE ENCOUNTER
Spoke back with Ivory and scheduled her follow up for October and needing orders to try and schedule ultrasound for 10/20 in the place of the MRI----- Message from Tawana Chen MD sent at 7/24/2023 10:35 AM CDT -----  1. Change MRI to abdo US limited  2. Labs in 03/24  3. Consider vaccination for hepatitis A  4. Stay off alcohol  5. Return after US

## 2023-07-24 NOTE — PATIENT INSTRUCTIONS
[facility]  Labs in 03/24  Consider vaccination for hepatitis A  Stay off alcohol  Return after US

## 2023-07-24 NOTE — Clinical Note
1. Change MRI to abdo US limited 2. Labs in 03/24 3. Consider vaccination for hepatitis A 4. Stay off alcohol 5. Return after US

## 2023-07-26 ENCOUNTER — OFFICE VISIT (OUTPATIENT)
Dept: PSYCHIATRY | Facility: CLINIC | Age: 48
End: 2023-07-26
Payer: COMMERCIAL

## 2023-07-26 VITALS
SYSTOLIC BLOOD PRESSURE: 115 MMHG | DIASTOLIC BLOOD PRESSURE: 68 MMHG | HEART RATE: 60 BPM | WEIGHT: 162.69 LBS | HEIGHT: 67 IN | BODY MASS INDEX: 25.53 KG/M2

## 2023-07-26 DIAGNOSIS — F33.1 MODERATE EPISODE OF RECURRENT MAJOR DEPRESSIVE DISORDER: Primary | ICD-10-CM

## 2023-07-26 DIAGNOSIS — F41.0 GENERALIZED ANXIETY DISORDER WITH PANIC ATTACKS: ICD-10-CM

## 2023-07-26 DIAGNOSIS — F17.200 NICOTINE DEPENDENCE DUE TO VAPING NON-TOBACCO PRODUCT: ICD-10-CM

## 2023-07-26 DIAGNOSIS — F41.1 GENERALIZED ANXIETY DISORDER WITH PANIC ATTACKS: ICD-10-CM

## 2023-07-26 DIAGNOSIS — F10.91 ALCOHOL USE DISORDER IN REMISSION: ICD-10-CM

## 2023-07-26 DIAGNOSIS — G47.00 INSOMNIA, UNSPECIFIED TYPE: ICD-10-CM

## 2023-07-26 PROBLEM — F33.41 RECURRENT MAJOR DEPRESSIVE DISORDER, IN PARTIAL REMISSION: Status: RESOLVED | Noted: 2023-05-31 | Resolved: 2023-07-26

## 2023-07-26 PROCEDURE — 99214 PR OFFICE/OUTPT VISIT, EST, LEVL IV, 30-39 MIN: ICD-10-PCS | Mod: 25,S$GLB,, | Performed by: STUDENT IN AN ORGANIZED HEALTH CARE EDUCATION/TRAINING PROGRAM

## 2023-07-26 PROCEDURE — 96372 THER/PROPH/DIAG INJ SC/IM: CPT | Mod: S$GLB,,, | Performed by: STUDENT IN AN ORGANIZED HEALTH CARE EDUCATION/TRAINING PROGRAM

## 2023-07-26 PROCEDURE — 96372 PR INJECTION,THERAP/PROPH/DIAG2ST, IM OR SUBCUT: ICD-10-PCS | Mod: S$GLB,,, | Performed by: STUDENT IN AN ORGANIZED HEALTH CARE EDUCATION/TRAINING PROGRAM

## 2023-07-26 PROCEDURE — 99999 PR PBB SHADOW E&M-EST. PATIENT-LVL III: ICD-10-PCS | Mod: PBBFAC,,, | Performed by: STUDENT IN AN ORGANIZED HEALTH CARE EDUCATION/TRAINING PROGRAM

## 2023-07-26 PROCEDURE — 96136 PSYCL/NRPSYC TST PHY/QHP 1ST: CPT | Mod: S$GLB,,, | Performed by: STUDENT IN AN ORGANIZED HEALTH CARE EDUCATION/TRAINING PROGRAM

## 2023-07-26 PROCEDURE — 99214 OFFICE O/P EST MOD 30 MIN: CPT | Mod: 25,S$GLB,, | Performed by: STUDENT IN AN ORGANIZED HEALTH CARE EDUCATION/TRAINING PROGRAM

## 2023-07-26 PROCEDURE — 96136 PR PSYCH/NEUROPSYCH TEST ADMIN/SCORING, 2+ TESTS, 1ST 30 MIN: ICD-10-PCS | Mod: S$GLB,,, | Performed by: STUDENT IN AN ORGANIZED HEALTH CARE EDUCATION/TRAINING PROGRAM

## 2023-07-26 PROCEDURE — 99999 PR PBB SHADOW E&M-EST. PATIENT-LVL III: CPT | Mod: PBBFAC,,, | Performed by: STUDENT IN AN ORGANIZED HEALTH CARE EDUCATION/TRAINING PROGRAM

## 2023-07-26 RX ORDER — FLUOXETINE HYDROCHLORIDE 40 MG/1
80 CAPSULE ORAL DAILY
Qty: 60 CAPSULE | Refills: 2 | Status: SHIPPED | OUTPATIENT
Start: 2023-07-26 | End: 2023-09-21 | Stop reason: SDUPTHER

## 2023-07-26 RX ORDER — IBUPROFEN 200 MG
1 TABLET ORAL
COMMUNITY
Start: 2023-06-29 | End: 2023-09-11 | Stop reason: ALTCHOICE

## 2023-07-26 RX ORDER — TRAZODONE HYDROCHLORIDE 50 MG/1
50 TABLET ORAL NIGHTLY
Qty: 30 TABLET | Refills: 1 | Status: SHIPPED | OUTPATIENT
Start: 2023-07-26 | End: 2023-11-16 | Stop reason: SDUPTHER

## 2023-07-26 NOTE — PROGRESS NOTES
Outpatient Psychiatry Followup Visit (/MD/NP)    2023    Assessment - Plan:     Diagnostic Impression     ICD-10-CM ICD-9-CM   1. Moderate episode of recurrent major depressive disorder  F33.1 296.32   2. Generalized anxiety disorder with panic attacks  F41.1 300.02    F41.0 300.01   3. Insomnia, unspecified type  G47.00 780.52   4. Nicotine dependence due to vaping non-tobacco product  F17.200 305.1   5. Alcohol use disorder in remission  F10.91 305.03   6. BMI 25.0-25.9,adult  Z68.25 V85.21      2023 (1) Overall condition of patient is stable; focus is on recovery and relapse prevention.     Medication Management   Chart was reviewed. The risks and benefits of medication were discussed with pt. The treatment plan and followup plan were reviewed with pt. Pt understands to contact clinic if symptoms worsen. Pt understands to call 911 or go to nearest ER for suicidal ideation, intent or plan.   RX History ATARAX/VISTARIL, CELEXA, CLONIDINE, DIPHENHYDRAMINE, EFFEXOR, ELAVIL, GABAPENTIN, NALTREXONE, PROZAC, REMERON, RESTORIL, TRAZODONE, WELLBUTRIN, and ZOLOFT   Current RX Increase PROZAC  No excessive activation [id number], [id number]  In  consider reduction of dose  Adjustments:  33FGG3861: Increase to 80mg daily  27KBO7350: Increase to 60mg daily  03HLE0387: Increase to 40mg daily  21IYH7344: Increase to 20mg daily  23NVT8687: Start 10mg daily  Prior to evaluation pt had been taking EFFEXOR and WELLBUTRIN  Continue TRAZODONE  Adjustments:  46OUQ8759: Reduce to 50mg nightly  13JUW7867: Increase to 100mg nightly  58KNK5700: Adjust 50mg HS prn sleep  50WSB2771: Start 50mg HS  Prior to evaluation pt had been taking ATARAX  Continue VIVITROL 380mg IM q28d  Tolerability established with PO formulation  Administration Lo2023  68BSJ3072  17RBN0431  27QAK1738  Increase OTC L-METHYLFOLATE  C677T C/T  Adjustments:  98FXG0476: Increase to 15mg daily  89JHG0841: Start 7.5mg daily  Continue NRT 10w  taper 72SRZ0670   Education & Counseling RX administration and adherence   Other Orders Continue individual psychotherapy   Monitor VITAL SIGNS  Use of: vaping/nicotine  Use of: alcohol  Instruments: PROMIS (A&D), PSS4   RETURN R: RETURN IN 8 WEEKS (TWO MONTHS), and reassess frequency three visits from now  Continue medication management.     Interval History - Review of Systems:     Available documentation has been reviewed, and pertinent elements of the chart have been incorporated into this note where appropriate.   12/29/2022 : first Epic encounter with psychiatry department  6/28/2023 : last Epic encounter with psychiatry department  5/31/2023 : last Epic encounter with writer   Ivory Garye Rayo, a 48 y.o. female, presenting for followup visit.      Feel depressed and anxious. No changes in life. Feels worse when alone. Financial strain. Irritability. No crying spells. Forgot a couple doses last week.    No alcohol use for 5 months. Vaping continues. Forgets patches.    Discussed increasing L-METHYLFOLATE and PROZAC and to monitor. Considering worse mood and risk for decompensation, will not reduce frequency of visits until perhaps 2024.     Vegetative Functions   Sleep [x] Y  [] N  Worse.  Worse sleep hygiene.   GI/ [] Y  [] N     Appetite [] Y  [] N     Emotion and Mood   Negative Bias [x] Y  [] N  Worse.   Hedonic Capacity [] Y  [] N     Mood Dysregulation [x] Y  [] N  MOOD DYSREGULATION: irritability worsening.  MOOD DYSREGULATION: no crying spells.   Anxiety and Threat Perception   Rumination [] Y  [] N     Salience-Anxious Av. [] Y  [] N     Threat Dysregulation [] Y  [x] N  No panic attacks since last encounter.   Consciousness, Cognition and Reality Testing   Cognitive Dyscontrol [] Y  [] N     Inattention [] Y  [] N     Memory Problems [] Y  [] N     Perception Problems [] Y  [] N     Problems in Social Spheres   Home [] Y  [] N     Peers [] Y  " [] N     Work [] Y  [] N     Stress [x] Y  [] N       Pt did NOT display signs of nor endorse symptoms of overt psychosis or acute mood disorder requiring hospitalization during the encounter. Pt denied violent thoughts or suicidal or homicidal ideation, intent, or plan.       Measurement-Based Care (MBC):     Routine Instruments   PROMIS-ANXIETY Interpretation: T-SCORE 67; MODERATE using 55-60-70 cutoffs. Compared to MILD (58) last time, PROMIS-ANXIETY WORSENED.   PROMIS-DEPRESSION Interpretation: T-SCORE 62; MODERATE using 55-60-70 cutoffs. Compared to NONE TO SLIGHT (51) last time, PROMIS-DEPRESSION WORSENED.   PSS4 Interpretation: 12/16; HIGH using 6-11 cutoffs. 2 PH, 1 LSE. Compared to MODERATE 8/16 last time, PSS4 WORSENED.   Additional Instruments   N/A     Current Evaluation of Mental Status:     Constitutional / General       Vitals:    07/26/23 1115   BP: 115/68   Pulse: 60   Weight: 73.8 kg (162 lb 11.2 oz)   Height: 5' 7" (1.702 m)       Psychiatric / Mental Status Examination  1. Appearance: Dress is informal but appropriate. Motor activity normal.  2. Discourse: Clear speech with normal rate and volume. Associations intact. Orderly.  3. Emotional Expression: Somewhat anxious and depressed mood. Affect is appropriate.  4. Perception and Thinking: No hallucinations. No suicidality, no homicidality, delusions, or paranoia.  5. Sensorium: Grossly intact. Able to focus for interview.  6. Memory and Fund of Knowledge: Intact for content of interview.  7. Insight and Judgment: Intact.         Auto-populated Chart Data:     Medications  Outpatient Encounter Medications as of 7/26/2023   Medication Sig Dispense Refill    albuterol (PROVENTIL/VENTOLIN HFA) 90 mcg/actuation inhaler Inhale 2 puffs into the lungs every 6 (six) hours as needed for Wheezing. Rescue 18 g 11    ergocalciferol (ERGOCALCIFEROL) 50,000 unit Cap Take 1 capsule (50,000 Units total) by mouth every 7 days. 12 capsule 3    " hydrocortisone 2.5 % cream Apply topically 2 (two) times daily. 28 g 1    levothyroxine (SYNTHROID) 50 MCG tablet Take 1 tablet (50 mcg total) by mouth before breakfast. 90 tablet 3    meloxicam (MOBIC) 15 MG tablet TAKE 1 TABLET BY MOUTH EVERY DAY 30 tablet 0    nicotine (NICODERM CQ) 21 mg/24 hr 1 patch.      rosuvastatin (CRESTOR) 20 MG tablet Take 1 tablet (20 mg total) by mouth once daily. 90 tablet 3    [DISCONTINUED] FLUoxetine 20 MG capsule Take a 40mg cap with a 20mg cap (60mg total) daily. 30 capsule 1    [DISCONTINUED] FLUoxetine 40 MG capsule Take a 40mg cap with a 20mg cap (60mg total) daily. 30 capsule 1    [DISCONTINUED] phentermine (ADIPEX-P) 37.5 mg tablet Take 1 tablet (37.5 mg total) by mouth before breakfast. 30 tablet 1    [DISCONTINUED] traZODone (DESYREL) 50 MG tablet Take 1 tablet (50 mg total) by mouth every evening. 30 tablet 1    FLUoxetine 40 MG capsule Take 2 capsules (80 mg total) by mouth once daily. 60 capsule 2    traZODone (DESYREL) 50 MG tablet Take 1 tablet (50 mg total) by mouth every evening. 30 tablet 1    valACYclovir (VALTREX) 1000 MG tablet Take 2 tablets (2,000 mg total) by mouth 2 (two) times daily. for 1 day 8 tablet 5     Facility-Administered Encounter Medications as of 7/26/2023   Medication Dose Route Frequency Provider Last Rate Last Admin    naltrexone microspheres kit SSRR 380 mg  380 mg Intramuscular Q28 Days Bandar Sheridan DO   380 mg at 06/28/23 1132      The chart was reviewed for recent diagnostic procedures and investigations, and pertinent results are noted below.    Wt Readings from Last 2 Encounters:   07/26/23 73.8 kg (162 lb 11.2 oz)   07/05/23 78 kg (172 lb)     BP Readings from Last 1 Encounters:   07/26/23 115/68     Pulse Readings from Last 1 Encounters:   07/26/23 60        Blood Counts, Electrolytes & Glucose: (i.e. WBC, ANC, Hemoglobin, Hematocrit, MCV, Platelets)  Lab Results   Component Value Date    WBC 6.62 03/02/2023    GRAN 4.4  03/02/2023    GRAN 65.7 03/02/2023    HGB 13.1 03/02/2023    HCT 40.7 03/02/2023    MCV 95 03/02/2023     03/02/2023     03/02/2023    K 3.6 03/02/2023    CALCIUM 9.8 03/02/2023    CO2 29 03/02/2023    ANIONGAP 8 03/02/2023    GLU 90 03/02/2023    HGBA1C 5.4 03/02/2023       Renal, Liver, Pancreas, Thyroid, Parathyroid, Prolactin, CPK, Lipids & Vitamin Levels: (i.e. Cr, BUN, Anion Gap, GFR, Urine Specific Gravity, Urine Protein, Microalburnin, AST, ALT, GGT, Alk Phos,Total Bili, Total Protein, Albumin, Ammonia, INR, Amylase, Lipase, TSH, Total T3, Total T4, Free T4 PTH, Prolactin, CPK, Cholesterol, Triglycerides, LDH, HDL, Vitamin B12, Folate, Vitamin D)  Lab Results   Component Value Date    CREATININE 0.8 03/02/2023    BUN 11 03/02/2023    EGFRNORACEVR >60.0 03/02/2023    SPECGRAV 1.020 08/24/2022    PROTEINUA Trace (A) 08/24/2022    AST 16 03/02/2023    ALT 14 03/02/2023    ALKPHOS 60 03/02/2023    BILITOT 0.6 03/02/2023    ALBUMIN 4.1 03/02/2023    INR 1.0 11/25/2016    TSH 0.580 05/02/2023    FREET4 0.90 08/24/2022    CHOL 149 03/02/2023    TRIG 68 03/02/2023    LDLCALC 90.4 03/02/2023    HDL 45 03/02/2023    [id number] 689 03/02/2023    FOLATE 13.5 02/17/2017    [id number] 42 05/02/2023       Infection Diseases, Pregnancy Screenings & Drug Levels: (i.e. Hepatitis Panel, HIV, Syphilis, Urine & Blood Pregnancy Screens, beta hCG, Lithium, Valproic Acid, Carbamazepine, Lamotrigine, Phenytoin, Phenobarbital, Clozapine, Norclozapine, Clozapine + Norclozapine)   Lab Results   Component Value Date    HEPCAB Negative 12/30/2020       Addiction: (i.e. Urine Toxicology, Blood Alcohol, PETH, EtG, EtS, CDT, Buprenorphine, Norbuprenorphine)  No results found for: PCDSOALCOHOL, PCDSOBENZOD, BARBITURATES, PCDSCOMETHA, OPIATESCREEN, COCAINEMETAB, AMPHETAMINES, MARIJUANATHC, PCDSOPHENCYN, PCDSUBUPRE, PCDSUFENTANY, PCDSUOXYCOD, PCDSUTRAMA, [id number], PETH, ALCOHOLMEDIC, THEYLGLUCU, ETHYLSULF, CDT, BUPRENORPH,  "NORBUPRENOR    Results for orders placed or performed in visit on 01/04/18   EKG 12-lead    Collection Time: 01/04/18 10:13 AM    Narrative    Test Reason : Chest Pain  Blood Pressure : X/X mmHG  Vent. Rate : 088 BPM     Atrial Rate : 088 BPM     P-R Int : 150 ms          QRS Dur : 092 ms      QT Int : 374 ms       P-R-T Axes : 071 076 059 degrees     QTc Int : 452 ms    ECG storage only, no interpretation/report provided  Confirmed by STORAGE ONLY, NO REPORT (34),  Alecia Bailey (3) on  1/4/2018 2:50:04 PM    Referred By: KWAN MOREAU           Confirmed By:NO REPORT STORAGE ONLY          Billing Documentation:     Method of Encounter IN PERSON visit at the clinic   Type of Encounter Follow up visit with me   Counseling;  Psychotherapy    Counseling;  Tobacco and/or Nicotine    Additional Codes and Modifiers 93728, with modifer 59: administered and scored more than one psychological or neuropsychological tests (see MBC above) (16+ mins)   Time Remaining Chart/Pt 37494: FOLLOW UP VISIT, Rx mgmt, "Multiple STABLE chronic illnesses"   Total Mins  (7/26/2023) N/A - Not billing for time        Bandar Sheridan DO  Department of Psychiatry, [facility]        "

## 2023-07-26 NOTE — PATIENT INSTRUCTIONS
Increase L-METHYLFOLATE to 15mg daily  Increase PROZAC to 80mg daily  Continue other medications as prescribed

## 2023-07-26 NOTE — PROGRESS NOTES
Administered vivitrol  IM.   NDC- 86416-653-52  LOT- [id number]  Exp- 09-30-25  Patient tolerated well. No bleeding at insertion site noted. Pain scale 0/10 with injection. 2 patient identifiers used (name, ), aseptic technique maintained.

## 2023-07-28 ENCOUNTER — TELEPHONE (OUTPATIENT)
Dept: PSYCHIATRY | Facility: CLINIC | Age: 48
End: 2023-07-28
Payer: COMMERCIAL

## 2023-08-20 DIAGNOSIS — E03.9 HYPOTHYROIDISM, UNSPECIFIED TYPE: ICD-10-CM

## 2023-08-20 DIAGNOSIS — E78.5 DYSLIPIDEMIA: ICD-10-CM

## 2023-08-20 NOTE — TELEPHONE ENCOUNTER
No care due was identified.  [facility] Embedded Care Due Messages. Reference number: [id number].   8/20/2023 7:47:48 AM CDT

## 2023-08-21 NOTE — TELEPHONE ENCOUNTER
Refill Routing Note   Medication(s) are not appropriate for processing by [facility] for the following reason(s):      No active prescription written by provider    [facility] action(s):  Defer Care Due:  None identified            Appointments  past 12m or future 3m with PCP    Date Provider   Last Visit   5/30/2023 Didi Conteh MD   Next Visit   Visit date not found Didi Conteh MD   ED visits in past 90 days: 0        Note composed:12:25 PM 08/21/2023

## 2023-08-22 RX ORDER — ROSUVASTATIN CALCIUM 20 MG/1
20 TABLET, COATED ORAL
Qty: 90 TABLET | Refills: 3 | Status: SHIPPED | OUTPATIENT
Start: 2023-08-22

## 2023-08-22 RX ORDER — LEVOTHYROXINE SODIUM 50 UG/1
50 TABLET ORAL
Qty: 90 TABLET | Refills: 3 | Status: SHIPPED | OUTPATIENT
Start: 2023-08-22

## 2023-08-24 ENCOUNTER — CLINICAL SUPPORT (OUTPATIENT)
Dept: PSYCHIATRY | Facility: CLINIC | Age: 48
End: 2023-08-24
Payer: COMMERCIAL

## 2023-08-24 DIAGNOSIS — F10.91 ALCOHOL USE DISORDER IN REMISSION: Primary | ICD-10-CM

## 2023-08-24 PROCEDURE — 96372 THER/PROPH/DIAG INJ SC/IM: CPT | Mod: S$GLB,,, | Performed by: STUDENT IN AN ORGANIZED HEALTH CARE EDUCATION/TRAINING PROGRAM

## 2023-08-24 PROCEDURE — 99999 PR PBB SHADOW E&M-EST. PATIENT-LVL I: ICD-10-PCS | Mod: PBBFAC,,,

## 2023-08-24 PROCEDURE — 99999 PR PBB SHADOW E&M-EST. PATIENT-LVL I: CPT | Mod: PBBFAC,,,

## 2023-08-24 PROCEDURE — 96372 PR INJECTION,THERAP/PROPH/DIAG2ST, IM OR SUBCUT: ICD-10-PCS | Mod: S$GLB,,, | Performed by: STUDENT IN AN ORGANIZED HEALTH CARE EDUCATION/TRAINING PROGRAM

## 2023-08-24 NOTE — PROGRESS NOTES
Administered vivitrol 380 mg  IM.   NDC- 51837-557-23  LOT- [id number]  Exp- 2025  Patient tolerated well. No bleeding at insertion site noted. Pain scale 0/10 with injection. 2 patient identifiers used (name, ), aseptic technique maintained.

## 2023-09-06 DIAGNOSIS — Z12.31 OTHER SCREENING MAMMOGRAM: ICD-10-CM

## 2023-09-10 NOTE — PROGRESS NOTES
PI:  Ivory Cade is a 48 y.o. female with history of hemorrhoids for years after childbirth.    Last 4 months on Adipex for wt loss.  Severe constipation.    Requires mag citrate capsules qhs and Miralax prn.    Pain and straining with BMs.  Swelling.  No blood.      Cscope 2021    Past Medical History:   Diagnosis Date    Abdominal mass, left lower quadrant 11/2016    Anxiety disorder 2010    Asthma     Bilateral ovarian cysts     BMI 30.0-30.9,adult 2/2/2023    Cancer antigen 125 () elevation 11/2016    Fatty liver 7/24/2023    Focal nodular hyperplasia of liver 7/24/2023    High cholesterol     History of hemorrhoids     Incisional abscess     right breast I/D abscess 2012    Mild episode of recurrent major depressive disorder 11/16/2017    MTHFR mutation     Recurrent major depressive disorder, in partial remission 5/31/2023    Thrombocytopenia complicating pregnancy 2010 and 2012    Vitamin D insufficiency 12/29/2022        Past Surgical History:   Procedure Laterality Date    BREAST BIOPSY Right 2011    BREAST CYST INCISION AND DRAINAGE      right breast abscess I/D 2012    COLONOSCOPY  2014    COLONOSCOPY N/A 10/28/2021    Procedure: COLONOSCOPY;  Surgeon: Jeremy Wheatley Jr., MD;  Location: [facility];  Service: Endoscopy;  Laterality: N/A;    essure  2012    HYSTERECTOMY  2016    Partial - left ovary removed only    OOPHORECTOMY Left 2016       Review of patient's allergies indicates:   Allergen Reactions    Penicillins Hives       Family History   Problem Relation Age of Onset    Celiac disease Maternal Aunt     Hepatitis Maternal Aunt         autoimmune    Colon cancer Maternal Grandmother     Cancer Maternal Grandmother     Hepatitis Mother         autoimmune    Hypertension Mother     Pulmonary embolism Mother     Hypertension Father     Hyperlipidemia Father     Heart disease Father     Early death Father 53        MI    No Known Problems Brother     Breast cancer Neg Hx     Diabetes Neg Hx      Miscarriages / Stillbirths Neg Hx     Ovarian cancer Neg Hx     Stroke Neg Hx        Social History     Socioeconomic History    Marital status:     Number of children: 2   Tobacco Use    Smoking status: Every Day     Types: Vaping with nicotine    Smokeless tobacco: Never    Tobacco comments:     on wellbutrin, smokes 3 cigs per week   Substance and Sexual Activity    Alcohol use: Yes     Alcohol/week: 10.0 standard drinks of alcohol     Types: 10 Glasses of wine per week    Drug use: No    Sexual activity: Yes     Partners: Male   Social History Narrative           ROS:  GENERAL: No fever, chills, fatigability or weight loss.  Integument: No rashes, redness, icterus  CHEST: Denies PUGA, cyanosis, wheezing, cough and sputum production.  CARDIOVASCULAR: Denies chest pain, PND, orthopnea or reduced exercise tolerance.  GI: Denies abd pain, dysphagia, nausea, vomiting, no hematemesis   : Denies burning on urination, no hematuria, no bacteriuria  MSK: No deformities, swelling, joint pain swelling  Neurologic: No HAs, seizures, weakness, paresthesias, gait problems    PE:  General appearance healthy  LMP 11/10/2016   Sclera/ Skin anicteric  AT NC EOMI  Neck supple trachea midline   Chest symmetric, nl excursion, no retractions, breathing comfortably  EXT - no CCE  Neuro:  Mood/ affect nl, alert and oriented x 3, moves all ext's, gait nl    Rectal  Inspection     Mild swelling.  No thrombus.  No anal fissures  JIMBO nl tone, no mass, squeeze present, nl relaxation of pubrectalis muscle      Assessment:  Constipation secondary to Adipex  Hemorrhoidal swelling, pain secondary to constipation    Plan:  Stop adipex  Anoscopy see below  High fiber diet  OV 3 months      Anoscopy Procedure Note:   Verbal consent obtained    Indications:  hemorrhoids    Post procedure diagnosis:  same, mild disease    Procedure: anoscopy    Surgeon SCOT    Assistant: MG    Specimen none    Findings:  see below    Right posterior  hemorrhoid grade 1  Right anterior hemorrhoid grade 1  Left lateral hemorrhoid grade 1    Pt tolerated procedure well.      No complications.

## 2023-09-11 ENCOUNTER — OFFICE VISIT (OUTPATIENT)
Dept: SURGERY | Facility: CLINIC | Age: 48
End: 2023-09-11
Payer: COMMERCIAL

## 2023-09-11 VITALS
DIASTOLIC BLOOD PRESSURE: 66 MMHG | RESPIRATION RATE: 16 BRPM | BODY MASS INDEX: 25.12 KG/M2 | OXYGEN SATURATION: 98 % | TEMPERATURE: 98 F | SYSTOLIC BLOOD PRESSURE: 114 MMHG | HEART RATE: 66 BPM | HEIGHT: 67 IN | WEIGHT: 160.06 LBS

## 2023-09-11 DIAGNOSIS — K59.04 CHRONIC IDIOPATHIC CONSTIPATION: Primary | ICD-10-CM

## 2023-09-11 DIAGNOSIS — K62.89 RECTAL OR ANAL PAIN: ICD-10-CM

## 2023-09-11 PROCEDURE — 46600 PR DIAG2STIC A2SCOPY: ICD-10-PCS | Mod: S$GLB,,, | Performed by: COLON & RECTAL SURGERY

## 2023-09-11 PROCEDURE — 99203 PR OFFICE/OUTPT VISIT, NEW, LEVL III, 30-44 MIN: ICD-10-PCS | Mod: 25,S$GLB,, | Performed by: COLON & RECTAL SURGERY

## 2023-09-11 PROCEDURE — 99203 OFFICE O/P NEW LOW 30 MIN: CPT | Mod: 25,S$GLB,, | Performed by: COLON & RECTAL SURGERY

## 2023-09-11 PROCEDURE — 46600 DIAGNOSTIC ANOSCOPY SPX: CPT | Mod: S$GLB,,, | Performed by: COLON & RECTAL SURGERY

## 2023-09-11 PROCEDURE — 99999 PR PBB SHADOW E&M-EST. PATIENT-LVL IV: ICD-10-PCS | Mod: PBBFAC,,, | Performed by: COLON & RECTAL SURGERY

## 2023-09-11 PROCEDURE — 99999 PR PBB SHADOW E&M-EST. PATIENT-LVL IV: CPT | Mod: PBBFAC,,, | Performed by: COLON & RECTAL SURGERY

## 2023-09-11 RX ORDER — PHENTERMINE HYDROCHLORIDE 37.5 MG/1
37.5 TABLET ORAL EVERY MORNING
COMMUNITY
Start: 2023-08-30 | End: 2023-09-21

## 2023-09-14 ENCOUNTER — PATIENT MESSAGE (OUTPATIENT)
Dept: FAMILY MEDICINE | Facility: CLINIC | Age: 48
End: 2023-09-14
Payer: COMMERCIAL

## 2023-09-21 ENCOUNTER — OFFICE VISIT (OUTPATIENT)
Dept: PSYCHIATRY | Facility: CLINIC | Age: 48
End: 2023-09-21
Payer: COMMERCIAL

## 2023-09-21 VITALS
WEIGHT: 159.63 LBS | HEIGHT: 67 IN | DIASTOLIC BLOOD PRESSURE: 89 MMHG | HEART RATE: 68 BPM | BODY MASS INDEX: 25.06 KG/M2 | SYSTOLIC BLOOD PRESSURE: 131 MMHG

## 2023-09-21 DIAGNOSIS — G47.00 INSOMNIA, UNSPECIFIED TYPE: ICD-10-CM

## 2023-09-21 DIAGNOSIS — F41.0 GENERALIZED ANXIETY DISORDER WITH PANIC ATTACKS: ICD-10-CM

## 2023-09-21 DIAGNOSIS — F17.200 NICOTINE DEPENDENCE DUE TO VAPING NON-TOBACCO PRODUCT: ICD-10-CM

## 2023-09-21 DIAGNOSIS — F10.91 ALCOHOL USE DISORDER IN REMISSION: ICD-10-CM

## 2023-09-21 DIAGNOSIS — F33.0 MILD EPISODE OF RECURRENT MAJOR DEPRESSIVE DISORDER: Primary | ICD-10-CM

## 2023-09-21 DIAGNOSIS — F41.1 GENERALIZED ANXIETY DISORDER WITH PANIC ATTACKS: ICD-10-CM

## 2023-09-21 PROCEDURE — 96136 PSYCL/NRPSYC TST PHY/QHP 1ST: CPT | Mod: 59,S$GLB,, | Performed by: STUDENT IN AN ORGANIZED HEALTH CARE EDUCATION/TRAINING PROGRAM

## 2023-09-21 PROCEDURE — 99214 OFFICE O/P EST MOD 30 MIN: CPT | Mod: 25,S$GLB,, | Performed by: STUDENT IN AN ORGANIZED HEALTH CARE EDUCATION/TRAINING PROGRAM

## 2023-09-21 PROCEDURE — 99214 PR OFFICE/OUTPT VISIT, EST, LEVL IV, 30-39 MIN: ICD-10-PCS | Mod: 25,S$GLB,, | Performed by: STUDENT IN AN ORGANIZED HEALTH CARE EDUCATION/TRAINING PROGRAM

## 2023-09-21 PROCEDURE — 96372 PR INJECTION,THERAP/PROPH/DIAG2ST, IM OR SUBCUT: ICD-10-PCS | Mod: S$GLB,,, | Performed by: STUDENT IN AN ORGANIZED HEALTH CARE EDUCATION/TRAINING PROGRAM

## 2023-09-21 PROCEDURE — 99999 PR PBB SHADOW E&M-EST. PATIENT-LVL III: ICD-10-PCS | Mod: PBBFAC,,, | Performed by: STUDENT IN AN ORGANIZED HEALTH CARE EDUCATION/TRAINING PROGRAM

## 2023-09-21 PROCEDURE — 96372 THER/PROPH/DIAG INJ SC/IM: CPT | Mod: S$GLB,,, | Performed by: STUDENT IN AN ORGANIZED HEALTH CARE EDUCATION/TRAINING PROGRAM

## 2023-09-21 PROCEDURE — 96136 PR PSYCH/NEUROPSYCH TEST ADMIN/SCORING, 2+ TESTS, 1ST 30 MIN: ICD-10-PCS | Mod: 59,S$GLB,, | Performed by: STUDENT IN AN ORGANIZED HEALTH CARE EDUCATION/TRAINING PROGRAM

## 2023-09-21 PROCEDURE — 99999 PR PBB SHADOW E&M-EST. PATIENT-LVL III: CPT | Mod: PBBFAC,,, | Performed by: STUDENT IN AN ORGANIZED HEALTH CARE EDUCATION/TRAINING PROGRAM

## 2023-09-21 RX ORDER — FLUOXETINE HYDROCHLORIDE 40 MG/1
80 CAPSULE ORAL DAILY
Qty: 60 CAPSULE | Refills: 1 | Status: SHIPPED | OUTPATIENT
Start: 2023-09-21 | End: 2023-11-16 | Stop reason: SDUPTHER

## 2023-09-21 NOTE — PROGRESS NOTES
Outpatient Psychiatry Followup Visit (DO/MD/NP)    2023  Assessment & Plan    Assessment - Plan:     Impression   2023 (2) Ongoing treatment of residual symptoms with some favorable progress.     ICD-10-CM ICD-9-CM   1. Mild episode of recurrent major depressive disorder  F33.0 296.31   2. Generalized anxiety disorder with panic attacks  F41.1 300.02    F41.0 300.01   3. Insomnia, unspecified type  G47.00 780.52   4. Nicotine dependence due to vaping non-tobacco product  F17.200 305.1   5. Alcohol use disorder in remission  F10.91 305.03   6. BMI 25.0-25.9,adult  Z68.25 V85.21        Plan of Care & Medication Management    Chart was reviewed. The risks and benefits of medication were discussed with pt. The treatment plan and followup plan were reviewed with pt. Pt understands to contact clinic if symptoms worsen. Pt understands to call 911 or go to nearest ER for suicidal ideation, intent or plan.   RX History ATARAX/VISTARIL, CELEXA, CLONIDINE, DIPHENHYDRAMINE, EFFEXOR, ELAVIL, GABAPENTIN, NALTREXONE, PROZAC, REMERON, RESTORIL, TRAZODONE, WELLBUTRIN, and ZOLOFT   Current RX Continue PROZAC  No excessive activation [id number], 04UXF4061  In  consider reduction of dose  Adjustments:  40AOU2667: Increase to 80mg daily  05KZY8143: Increase to 60mg daily  29QPG3767: Increase to 40mg daily  05UXQ8944: Increase to 20mg daily  43GZC7717: Start 10mg daily  Prior to evaluation pt had been taking EFFEXOR and WELLBUTRIN  Continue TRAZODONE  Adjustments:  07LHW2447: Reduce to 50mg nightly  42EET5018: Increase to 100mg nightly  44BOG4553: Adjust 50mg HS prn sleep  93FCZ8013: Start 50mg HS  Prior to evaluation pt had been taking ATARAX  Continue VIVITROL 380mg IM q28d  Tolerability established with PO formulation  Administration Lo2023  20AIK6382  50WGD2807  72QPC1669  52HSZ8842  43NIX3608  Continue OTC L-METHYLFOLATE  C677T C/T  Adjustments:  27JCX1788: Increase to 15mg daily  44DOP6079: Start 7.5mg  daily  Continue NRT 10w taper 06UFK2771   Education & Counseling RX administration and adherence   Other Orders Continue individual psychotherapy   Monitor VITAL SIGNS  Use of: vaping/nicotine  Use of: alcohol  Instruments: PROMIS (A&D), PSS4   RETURN S: RETURN IN 8 WEEKS (TWO MONTHS), and reassess frequency within two visits from now  Continue medication management.     Subjective    Interval History - Review of Systems (ROS):     Available documentation has been reviewed, and pertinent elements of the chart have been incorporated into this note where appropriate.   12/29/2022 : first Epic encounter with psychiatry department  8/24/2023 : last Epic encounter with psychiatry department  7/26/2023 : last Epic encounter with writer   Ivory Garye Rayo, a 48 y.o. female, presenting for followup visit.      Since last visit, reports overall about the same.    Financial strain. Craving sweets. Crying spells.    Missed therapy appt, new appt upcoming.    Reports hearing voices at bedtime. Unclear. Does not sound like psychosis. Seems to be hypnagogic illusions. Sleep hygiene is poor and affecting sleep quality. Counseling provided.    Asked about ADHD. Diagnosis is unlikely. Declines formal testing.    No alcohol, cannabis. Still vaping nicotine. Not using patches.    Will continue medications as prescribed.     Vegetative Functions   Sleep [x] Y  [] N  Worse.  Worse sleep hygiene.   GI/ [] Y  [] N     Appetite [] Y  [] N     Emotion and Mood   Negative Bias [] Y  [x] N  Overall no concerns, or concerns are minimal.   Hedonic Capacity [] Y  [] N     Mood Dysregulation [x] Y  [] N  MOOD DYSREGULATION: crying spells noted.   Anxiety and Threat Perception   CSTC: Rumination [x] Y  [] N  About the same.   CSTC: Salience/Apprehen. [] Y  [] N     Amygdala: Panic/Phobic [] Y  [] N     Consciousness, Cognition and Reality Testing   Cognitive Dyscontrol [] Y  [] N     Inattention [] Y   "[] N     Memory Problems [] Y  [] N     Perception Problems [] Y  [] N     Problems in Social Spheres   Home [] Y  [] N     Peers [] Y  [] N     Work [] Y  [] N     Stress [] Y  [] N       Pt did NOT display signs of nor endorse symptoms of overt psychosis or acute mood disorder requiring hospitalization during the encounter. Pt denied violent thoughts or suicidal or homicidal ideation, intent, or plan.     Objective    Measurement-Based Care (MBC):     Routine Instruments   PROMIS-ANXIETY Interpretation: 4a raw score 10, T-SCORE 59.5; MILD using 55-60-70 cutoffs. Last T-SCORE was 67. This PROMIS T-score improvement of more than 5 points is an IMPROVEMENT by more than a half standard deviation.   PROMIS-DEPRESSION Interpretation: 4a raw score 10, T-SCORE 58.9; MILD using 55-60-70 cutoffs. Last T-SCORE was 62. This PROMIS T-score change of 5 points or fewer indicates the score is probably stable.   PSS4 Interpretation: 8/16; MODERATE using 6-11 cutoffs. 0 PH, 0 LSE. Compared to HIGH 12/16 last time, PSS4 IMPROVED.   Additional Instruments   N/A     Current Evaluation of Mental Status:     Constitutional / General       Vitals:    09/21/23 1112   BP: 131/89   Pulse: 68   Weight: 72.4 kg (159 lb 9.8 oz)   Height: 5' 7" (1.702 m)       Psychiatric / Mental Status Examination  1. Appearance: Dress is informal but appropriate. Motor activity normal.  2. Discourse: Clear speech with normal rate and volume. Associations intact. Orderly.  3. Emotional Expression: Somewhat anxious and depressed mood. Affect is appropriate.  4. Perception and Thinking: No hallucinations. No suicidality, no homicidality, delusions, or paranoia.  5. Sensorium: Grossly intact. Able to focus for interview.  6. Memory and Fund of Knowledge: Intact for content of interview.  7. Insight and Judgment: Intact.         Auto-populated chart data omitted from this note for brevity.      Billing Documentation:     Method of " "Encounter IN PERSON visit at the clinic   Type of Encounter Follow up visit with me   Counseling;  Psychotherapy    Counseling;  Tobacco and/or Nicotine    Additional Codes and Modifiers 39991, with modifer 59: administered and scored more than one psychological or neuropsychological tests (see MBC above) (16+ mins)   Time Remaining Chart/Pt 72005: FOLLOW UP VISIT, Rx mgmt, "Multiple STABLE chronic illnesses; no changes in treatment at this time"   Total Mins  (9/21/2023) N/A - Not billing for time        Bandar Sheridan DO  Department of Psychiatry, [facility]        "

## 2023-09-21 NOTE — PROGRESS NOTES
Administered Vivitrol 380 mg  IM.   NDC- 70754-223-70  LOT- 2023-1006T  Exp- 2025  Patient tolerated well. No bleeding at insertion site noted. Pain scale 0/10 with injection. 2 patient identifiers used (name, ), aseptic technique maintained.

## 2023-09-26 ENCOUNTER — TELEPHONE (OUTPATIENT)
Dept: FAMILY MEDICINE | Facility: CLINIC | Age: 48
End: 2023-09-26
Payer: COMMERCIAL

## 2023-09-26 DIAGNOSIS — R92.8 ABNORMAL MAMMOGRAM: Primary | ICD-10-CM

## 2023-09-26 NOTE — TELEPHONE ENCOUNTER
Orders pended. Please review and sign. Will send back to breast [facility] for scheduling.

## 2023-09-26 NOTE — TELEPHONE ENCOUNTER
----- Message from Aye Swain RN sent at 9/26/2023 10:40 AM CDT -----  Regarding: orders needed  Please see recent mammogram report. We need orders for rt diagnostic mammogram and rt breast ultrasound. I'll get her scheduled ASAP.  Thanks,  Guadalupe Swain RN  [facility]

## 2023-10-04 ENCOUNTER — PATIENT MESSAGE (OUTPATIENT)
Dept: PSYCHIATRY | Facility: CLINIC | Age: 48
End: 2023-10-04
Payer: COMMERCIAL

## 2023-10-11 ENCOUNTER — PATIENT MESSAGE (OUTPATIENT)
Dept: FAMILY MEDICINE | Facility: CLINIC | Age: 48
End: 2023-10-11
Payer: COMMERCIAL

## 2023-10-11 ENCOUNTER — OFFICE VISIT (OUTPATIENT)
Dept: PSYCHIATRY | Facility: CLINIC | Age: 48
End: 2023-10-11
Payer: COMMERCIAL

## 2023-10-11 DIAGNOSIS — F41.1 GENERALIZED ANXIETY DISORDER: ICD-10-CM

## 2023-10-11 DIAGNOSIS — F33.1 MODERATE EPISODE OF RECURRENT MAJOR DEPRESSIVE DISORDER: Primary | ICD-10-CM

## 2023-10-11 DIAGNOSIS — R41.840 ATTENTION AND CONCENTRATION DEFICIT: ICD-10-CM

## 2023-10-11 PROCEDURE — 90834 PSYTX W PT 45 MINUTES: CPT | Mod: S$GLB,,, | Performed by: SOCIAL WORKER

## 2023-10-11 PROCEDURE — 99999 PR PBB SHADOW E&M-EST. PATIENT-LVL II: ICD-10-PCS | Mod: PBBFAC,,, | Performed by: SOCIAL WORKER

## 2023-10-11 PROCEDURE — 90834 PR PSYCHOTHERAPY W/PATIENT, 45 MIN: ICD-10-PCS | Mod: S$GLB,,, | Performed by: SOCIAL WORKER

## 2023-10-11 PROCEDURE — 99999 PR PBB SHADOW E&M-EST. PATIENT-LVL II: CPT | Mod: PBBFAC,,, | Performed by: SOCIAL WORKER

## 2023-10-11 NOTE — PROGRESS NOTES
Individual Psychotherapy (PhD/LCSW)    10/11/2023    Site:  [place]         Therapeutic Intervention: Met with patient.  Outpatient - Insight oriented psychotherapy 45 min - CPT code 77591, Outpatient - Behavior modifying psychotherapy 45 min - CPT code 72600, and Outpatient - Supportive psychotherapy 45 min - CPT Code 68975    Chief complaint/reason for encounter: depression and anxiety             Interval history and content of current session: Ct was referred to tx by Dr Sheridan to address AUD, depression, and anxiety. Ct arrived to session and was fully engaged. Ct shared that work has been stressful. She shared that she has been feeling alone at times. Sw and ct processed how that base feeling of loneliness contributes to ct's reactions to stress and how she takes things personally because of how she sees herself and her situation. Ct was receptive to feedback. SW and ct processed coping skills and thought stopping techniques for ct to use when she finds herself stuck with negative thoughts. Ct to continue in 1:1 sessions.       Treatment plan:  Target symptoms: depression, anxiety   Why chosen therapy is appropriate versus another modality: relevant to diagnosis, patient responds to this modality, evidence based practice  Outcome monitoring methods: self-report  Therapeutic intervention type: insight oriented psychotherapy, behavior modifying psychotherapy, supportive psychotherapy    Risk parameters:  Patient reports no suicidal ideation  Patient reports no homicidal ideation  Patient reports no self-injurious behavior  Patient reports no violent behavior    CSSRS was completed:     Mental Status Exam:  General Appearance:  unremarkable, age appropriate   Speech: normal tone, normal rate, normal pitch, normal volume      Level of Cooperation: cooperative      Thought Processes: normal and logical   Mood: steady      Thought Content: normal, no suicidality, no homicidality, delusions, or paranoia   Affect:  appropriate   Orientation: Oriented x3   Memory: recent >  intact   Attention Span & Concentration: intact   Fund of General Knowledge: intact and appropriate to age and level of education   Abstract Reasoning: interpretation of similarities was abstract   Judgment & Insight: fair, intact     Language intact       Verbal deficits: None    Patient's response to intervention:  The patient's response to intervention is accepting.    Progress toward goals and other mental status changes:  The patient's progress toward goals is fair .    Diagnosis:     ICD-10-CM ICD-9-CM   1. Moderate episode of recurrent major depressive disorder  F33.1 296.32   2. Attention and concentration deficit  R41.840 799.51   3. Generalized anxiety disorder  F41.1 300.02       Plan:  individual psychotherapy and ct to follow up with Dr. Sheridan for psych med mgt  Pt to go to ED or call 911 if symptoms worsen or if she has thoughts of harming self and/or others. Pt verbalized understanding.    Return to clinic: as scheduled    Length of Service (minutes): 45      A portion of this note was created using flaregames voice recognition software that occasionally misinterprets phrases or words.    Each patient to whom he or she provides medical services by telemedicine is: (1) informed of the relationship between the physician and patient and the respective role of any other health care provider with respect to management of the patient; and (2) notified that he or she may decline to receive medical services by telemedicine and may withdraw from such care at any time.

## 2023-10-16 ENCOUNTER — PATIENT MESSAGE (OUTPATIENT)
Dept: PSYCHIATRY | Facility: CLINIC | Age: 48
End: 2023-10-16
Payer: COMMERCIAL

## 2023-10-17 ENCOUNTER — OFFICE VISIT (OUTPATIENT)
Dept: FAMILY MEDICINE | Facility: CLINIC | Age: 48
End: 2023-10-17
Payer: COMMERCIAL

## 2023-10-17 VITALS — HEIGHT: 67 IN | WEIGHT: 159 LBS | BODY MASS INDEX: 24.96 KG/M2

## 2023-10-17 DIAGNOSIS — R45.4 IRRITABILITY: Primary | ICD-10-CM

## 2023-10-17 DIAGNOSIS — E03.9 HYPOTHYROIDISM, UNSPECIFIED TYPE: ICD-10-CM

## 2023-10-17 DIAGNOSIS — Z90.721 HISTORY OF OOPHORECTOMY, UNILATERAL: ICD-10-CM

## 2023-10-17 PROCEDURE — 99214 OFFICE O/P EST MOD 30 MIN: CPT | Mod: 95,,, | Performed by: INTERNAL MEDICINE

## 2023-10-17 PROCEDURE — 99214 PR OFFICE/OUTPT VISIT, EST, LEVL IV, 30-39 MIN: ICD-10-PCS | Mod: 95,,, | Performed by: INTERNAL MEDICINE

## 2023-10-17 NOTE — PROGRESS NOTES
Assessment and Plan:    1. Irritability  We discussed that some of the symptoms she is having, in particular the irritability and night sweats, may be related to menopause.  As she no longer has uterus and does not have visible periods, difficult to diagnose menopause based on that.  We will obtain FSH and estradiol.  Advised that I do not personally prescribe hormone replacement therapy with estradiol, but that if the labs show that this is low she may want to visit with a gynecologist.  We discussed that SSRIs like fluoxetine are beneficial for the irritability associated with menopause, and I would recommend giving this more time on the current dose to see if that is effective.  - FOLLICLE STIMULATING HORMONE; Future  - ESTRADIOL; Future    2. History of oophorectomy, unilateral  - FOLLICLE STIMULATING HORMONE; Future  - ESTRADIOL; Future    3. Hypothyroidism, unspecified type  - TSH; Future    ______________________________________________________________________  Subjective:    Chief Complaint:  Discuss labs    HPI:  Ivory is a 48 y.o. year old female patient with telemedicine visit today as consultation to discuss labs    The patient location is: home in [place]  The chief complaint leading to consultation is: as above    Visit type: audiovisual    Face to Face time with patient: 15 minutes  20 minutes of total time spent on the encounter, which includes face to face time and non-face to face time preparing to see the patient (eg, review of tests), Obtaining and/or reviewing separately obtained history, Documenting clinical information in the electronic or other health record, Independently interpreting results (not separately reported) and communicating results to the patient/family/caregiver, or Care coordination (not separately reported).         Each patient to whom he or she provides medical services by telemedicine is:  (1) informed of the relationship between the physician and patient and the respective role  of any other health care provider with respect to management of the patient; and (2) notified that he or she may decline to receive medical services by telemedicine and may withdraw from such care at any time.    Notes:     She reports that she has been having more irritability and getting angrier more easily. She notes that this has been getting worse overall. She is seeing psychiatry and was recently increased to 80 mg daily fluoxetine.    She had a hysterectomy with left sided oophorectomy in 2016. She still has her right ovary. She does not have periods, and does not have any cyclical symptoms that she has noticed. She does occasionally have night sweats. She has not noticed any change in libido, change in arousal, or change in lubrication with sex. She is still sexually active. She does not know what age her mom went through menopause.       Medications:  Current Outpatient Medications on File Prior to Visit   Medication Sig Dispense Refill    albuterol (PROVENTIL/VENTOLIN HFA) 90 mcg/actuation inhaler Inhale 2 puffs into the lungs every 6 (six) hours as needed for Wheezing. Rescue 18 g 11    FLUoxetine 40 MG capsule Take 2 capsules (80 mg total) by mouth once daily. 60 capsule 1    hydrocortisone 2.5 % cream Apply topically 2 (two) times daily. 28 g 1    levothyroxine (SYNTHROID) 50 MCG tablet TAKE 1 TABLET BY MOUTH BEFORE BREAKFAST. 90 tablet 3    rosuvastatin (CRESTOR) 20 MG tablet TAKE 1 TABLET BY MOUTH EVERY DAY 90 tablet 3    traZODone (DESYREL) 50 MG tablet Take 1 tablet (50 mg total) by mouth every evening. 30 tablet 1    valACYclovir (VALTREX) 1000 MG tablet Take 2 tablets (2,000 mg total) by mouth 2 (two) times daily. for 1 day 8 tablet 5     Current Facility-Administered Medications on File Prior to Visit   Medication Dose Route Frequency Provider Last Rate Last Admin    naltrexone microspheres kit SSRR 380 mg  380 mg Intramuscular Q28 Days Bandar Sheridan, DO   380 mg at 09/21/23 0992  "      Review of Systems:  Review of Systems   Constitutional:  Negative for activity change and unexpected weight change.   HENT:  Positive for hearing loss. Negative for rhinorrhea and trouble swallowing.    Eyes:  Negative for discharge and visual disturbance.   Respiratory:  Negative for chest tightness and wheezing.    Cardiovascular:  Negative for chest pain and palpitations.   Gastrointestinal:  Positive for constipation. Negative for blood in stool, diarrhea and vomiting.   Endocrine: Negative for polydipsia and polyuria.   Genitourinary:  Negative for difficulty urinating, dysuria, hematuria and menstrual problem.   Musculoskeletal:  Positive for neck pain. Negative for arthralgias and joint swelling.   Neurological:  Negative for weakness and headaches.   Psychiatric/Behavioral:  Positive for dysphoric mood. Negative for confusion.      Entered by patient and reviewed and updated during visit      Past Medical History:  Past Medical History:   Diagnosis Date    Abdominal mass, left lower quadrant 11/2016    Anxiety disorder 2010    Asthma     Bilateral ovarian cysts     BMI 30.0-30.9,adult 2/2/2023    Cancer antigen 125 () elevation 11/2016    Fatty liver 7/24/2023    Focal nodular hyperplasia of liver 7/24/2023    High cholesterol     History of hemorrhoids     Incisional abscess     right breast I/D abscess 2012    Mild episode of recurrent major depressive disorder 11/16/2017    MTHFR mutation     Recurrent major depressive disorder, in partial remission 5/31/2023    Thrombocytopenia complicating pregnancy 2010 and 2012    Vitamin D insufficiency 12/29/2022       Objective:    Vitals:  Vitals:    10/17/23 0948   Weight: 72.1 kg (159 lb)   Height: 5' 7" (1.702 m)       Physical Exam  Constitutional:       General: She is not in acute distress.     Appearance: She is well-developed.   HENT:      Head: Normocephalic and atraumatic.   Pulmonary:      Effort: Pulmonary effort is normal. No respiratory " distress.   Neurological:      Mental Status: She is alert and oriented to person, place, and time.   Psychiatric:         Behavior: Behavior normal.         Thought Content: Thought content normal.         Judgment: Judgment normal.         Data:  TSH normal in May    Didi Conteh MD  Internal Medicine

## 2023-10-19 ENCOUNTER — LAB VISIT (OUTPATIENT)
Dept: LAB | Facility: HOSPITAL | Age: 48
End: 2023-10-19
Attending: INTERNAL MEDICINE
Payer: COMMERCIAL

## 2023-10-19 DIAGNOSIS — R45.4 IRRITABILITY: ICD-10-CM

## 2023-10-19 DIAGNOSIS — Z90.721 HISTORY OF OOPHORECTOMY, UNILATERAL: ICD-10-CM

## 2023-10-19 DIAGNOSIS — E03.9 HYPOTHYROIDISM, UNSPECIFIED TYPE: ICD-10-CM

## 2023-10-19 LAB
ESTRADIOL SERPL-MCNC: 96 PG/ML
FSH SERPL-ACNC: 23.96 MIU/ML
TSH SERPL DL<=0.005 MIU/L-ACNC: 0.48 UIU/ML (ref 0.4–4)

## 2023-10-19 PROCEDURE — 83001 ASSAY OF GONADOTROPIN (FSH): CPT | Performed by: INTERNAL MEDICINE

## 2023-10-19 PROCEDURE — 36415 COLL VENOUS BLD VENIPUNCTURE: CPT | Mod: PN | Performed by: INTERNAL MEDICINE

## 2023-10-19 PROCEDURE — 84443 ASSAY THYROID STIM HORMONE: CPT | Performed by: INTERNAL MEDICINE

## 2023-10-19 PROCEDURE — 82670 ASSAY OF TOTAL ESTRADIOL: CPT | Performed by: INTERNAL MEDICINE

## 2023-10-20 ENCOUNTER — PATIENT MESSAGE (OUTPATIENT)
Dept: FAMILY MEDICINE | Facility: CLINIC | Age: 48
End: 2023-10-20
Payer: COMMERCIAL

## 2023-11-16 ENCOUNTER — OFFICE VISIT (OUTPATIENT)
Dept: PSYCHIATRY | Facility: CLINIC | Age: 48
End: 2023-11-16
Payer: COMMERCIAL

## 2023-11-16 VITALS
DIASTOLIC BLOOD PRESSURE: 79 MMHG | HEIGHT: 67 IN | BODY MASS INDEX: 25.57 KG/M2 | HEART RATE: 62 BPM | SYSTOLIC BLOOD PRESSURE: 114 MMHG | WEIGHT: 162.94 LBS

## 2023-11-16 DIAGNOSIS — F41.1 GENERALIZED ANXIETY DISORDER WITH PANIC ATTACKS: ICD-10-CM

## 2023-11-16 DIAGNOSIS — F41.0 GENERALIZED ANXIETY DISORDER WITH PANIC ATTACKS: ICD-10-CM

## 2023-11-16 DIAGNOSIS — F17.200 NICOTINE DEPENDENCE DUE TO VAPING NON-TOBACCO PRODUCT: ICD-10-CM

## 2023-11-16 DIAGNOSIS — F10.91 ALCOHOL USE DISORDER IN REMISSION: ICD-10-CM

## 2023-11-16 DIAGNOSIS — F33.41 RECURRENT MAJOR DEPRESSIVE DISORDER, IN PARTIAL REMISSION: Primary | ICD-10-CM

## 2023-11-16 DIAGNOSIS — Z15.89 HETEROZYGOUS MTHFR MUTATION C677T: ICD-10-CM

## 2023-11-16 DIAGNOSIS — G47.00 INSOMNIA, UNSPECIFIED TYPE: ICD-10-CM

## 2023-11-16 PROCEDURE — 96136 PSYCL/NRPSYC TST PHY/QHP 1ST: CPT | Mod: 59,S$GLB,, | Performed by: STUDENT IN AN ORGANIZED HEALTH CARE EDUCATION/TRAINING PROGRAM

## 2023-11-16 PROCEDURE — 99214 OFFICE O/P EST MOD 30 MIN: CPT | Mod: S$GLB,,, | Performed by: STUDENT IN AN ORGANIZED HEALTH CARE EDUCATION/TRAINING PROGRAM

## 2023-11-16 PROCEDURE — 96136 PR PSYCH/NEUROPSYCH TEST ADMIN/SCORING, 2+ TESTS, 1ST 30 MIN: ICD-10-PCS | Mod: 59,S$GLB,, | Performed by: STUDENT IN AN ORGANIZED HEALTH CARE EDUCATION/TRAINING PROGRAM

## 2023-11-16 PROCEDURE — 99214 PR OFFICE/OUTPT VISIT, EST, LEVL IV, 30-39 MIN: ICD-10-PCS | Mod: S$GLB,,, | Performed by: STUDENT IN AN ORGANIZED HEALTH CARE EDUCATION/TRAINING PROGRAM

## 2023-11-16 PROCEDURE — 99999 PR PBB SHADOW E&M-EST. PATIENT-LVL III: ICD-10-PCS | Mod: PBBFAC,,, | Performed by: STUDENT IN AN ORGANIZED HEALTH CARE EDUCATION/TRAINING PROGRAM

## 2023-11-16 PROCEDURE — 99999 PR PBB SHADOW E&M-EST. PATIENT-LVL III: CPT | Mod: PBBFAC,,, | Performed by: STUDENT IN AN ORGANIZED HEALTH CARE EDUCATION/TRAINING PROGRAM

## 2023-11-16 RX ORDER — FLUOXETINE HYDROCHLORIDE 40 MG/1
40 CAPSULE ORAL DAILY
Qty: 30 CAPSULE | Refills: 1 | Status: SHIPPED | OUTPATIENT
Start: 2023-12-16 | End: 2024-02-02 | Stop reason: SDUPTHER

## 2023-11-16 RX ORDER — TRAZODONE HYDROCHLORIDE 50 MG/1
50 TABLET ORAL NIGHTLY
Qty: 30 TABLET | Refills: 1 | Status: SHIPPED | OUTPATIENT
Start: 2023-12-16 | End: 2024-02-02 | Stop reason: SDUPTHER

## 2023-11-16 NOTE — PATIENT INSTRUCTIONS
"Reduce PROZAC from 80mg daily to 40mg daily  Stop taking VIVITROL    Monitor for SSRI discontinuation symptoms ("FINISH": Flu-like symptoms (lethargy, fatigue, headache, achiness, sweating), Insomnia (with vivid dreams or nightmares), Nausea (sometimes vomiting), Imbalance (dizziness, vertigo, light-headedness), Sensory disturbances (burning, tingling, electric-like or shock-like sensations) and Hyperarousal (anxiety, irritability, agitation, aggression, amber, jerkiness))    Keep therapy appointments       "

## 2023-11-16 NOTE — PROGRESS NOTES
Outpatient Psychiatry Followup Visit (DO/MD/NP, etc.)    2023  Assessment & Plan    Assessment - Plan:     Impression     ICD-10-CM ICD-9-CM   1. Recurrent major depressive disorder, in partial remission  F33.41 296.35   2. Generalized anxiety disorder with panic attacks  F41.1 300.02    F41.0 300.01   3. Insomnia, unspecified type  G47.00 780.52   4. Nicotine dependence due to vaping non-tobacco product  F17.200 305.1   5. Alcohol use disorder in remission  F10.91 305.03   6. Heterozygous MTHFR mutation C677T  Z15.89 V84.89   7. BMI 25.0-25.9,adult  Z68.25 V85.21        Plan of Care & Medication Management    Chart was reviewed. The risks and benefits of medication were discussed with pt. The treatment plan and followup plan were reviewed with pt. Pt understands to contact clinic if symptoms worsen. Pt understands to call 911 or go to nearest ER for suicidal ideation, intent or plan.   RX History ATARAX/VISTARIL, CELEXA, CLONIDINE, DIPHENHYDRAMINE, EFFEXOR, ELAVIL, L-METHYLFOLATE, GABAPENTIN, NALTREXONE/VIVITROL, , PROZAC, REMERON, RESTORIL, TRAZODONE, WELLBUTRIN, and ZOLOFT    Current RX Reduce PROZAC  No excessive activation 7HFV0534, 80XJX4535  Adjustments:  19SBL8825: Reduce to 40mg daily  05VZD4162: Increase to 80mg daily  35YET5767: Increase to 60mg daily  33ZJC3950: Increase to 40mg daily  94MEC2618: Increase to 20mg daily  17HGE3029: Start 10mg daily  Prior to evaluation pt had been taking EFFEXOR and WELLBUTRIN  Continue TRAZODONE  Adjustments:  23QWP3221: Reduce to 50mg nightly  39POZ9940: Increase to 100mg nightly  50ZAT1537: Adjust 50mg HS prn sleep  98TIE9429: Start 50mg HS  Prior to evaluation pt had been taking ATARAX  Discontinue VIVITROL 380mg IM q28d  Tolerability established with PO formulation  Administration [id number]  79NIG6500  74VHC6652  29IML2592  88QMX8685  21DZU4285  Continue OTC L-METHYLFOLATE  C677T C/T  Adjustments:  21JKH9027: Increase to 15mg daily  35HFF4460: Start  7.5mg daily  Continue NRT 10w taper 92SWS1951   Education & Counseling RX administration and adherence   Other Orders Continue individual psychotherapy   Monitor VITAL SIGNS  Use of: vaping/nicotine  Use of: alcohol  Instruments: PROMIS (A&D), PSS4   RETURN T: RETURN IN 8 WEEKS (TWO MONTHS), and reassess frequency next visit  Continue medication management.     Subjective    Interval History - Review of Systems (ROS):     Available documentation has been reviewed, and pertinent elements of the chart have been incorporated into this note where appropriate.   2022 : first Epic encounter with this clinic  10/11/2023 : last Epic encounter with this clinic  2023 : last Epic encounter with this writer   Ivory Cade, a 48 y.o. female, presenting for followup visit.      Since last visit, reports overall A LITTLE BETTER.    Grief, mother  earlier this month.    Missed VIVITROL injection. Declines to continue. Looking into getting into AA. Sober for 9 months.    Sleeping well.    Would like to reduce PROZAC. Discussed reducing from 80mg to 40mg, with monitoring for SSRI discontinuation symptoms       Pt did NOT display signs of nor endorse symptoms of overt psychosis or acute mood disorder requiring hospitalization during the encounter. Pt denied violent thoughts or suicidal or homicidal ideation, intent, or plan.         Objective    Measurement-Based Care (MBC):     Routine Instruments   PROMIS-ANXIETY Interpretation: 4a raw score 10, T-SCORE 59.5; MILD using 55-60-70 cutoffs. Last T-SCORE was 59.5. This PROMIS T-score change of 5 points or fewer indicates the score is probably stable.   PROMIS-DEPRESSION Interpretation: 4a raw score 6, T-SCORE 51.8; NONE TO SLIGHT using 55-60-70 cutoffs. Last T-SCORE was 58.9. This PROMIS T-score improvement of more than 5 points is an IMPROVEMENT by more than a half standard deviation.   PSS4 Interpretation: ; MODERATE using 6-11 cutoffs. 0 PH, 1 LSE. Last PSS4  "score was 8. This PSS4 score change of less than 4 points indicates the score is probably stable.   Additional Instruments   N/A     Current Evaluation of Mental Status:     Constitutional / General       Vitals:    11/16/23 1115   BP: 114/79   Pulse: 62   Weight: 73.9 kg (162 lb 14.7 oz)   Height: 5' 7" (1.702 m)       Psychiatric / Mental Status Examination  1. Appearance: Dress is informal but appropriate. Motor activity normal.  2. Discourse: Clear speech with normal rate and volume. Associations intact. Orderly.  3. Emotional Expression: Somewhat anxious. Affect is appropriate.  4. Perception and Thinking: No hallucinations. No suicidality, no homicidality, delusions, or paranoia.  5. Sensorium: Grossly intact. Able to focus for interview.  6. Memory and Fund of Knowledge: Intact for content of interview.  7. Insight and Judgment: Intact.         Auto-populated Chart Data:     Medications  Outpatient Encounter Medications as of 11/16/2023   Medication Sig Dispense Refill    albuterol (PROVENTIL/VENTOLIN HFA) 90 mcg/actuation inhaler Inhale 2 puffs into the lungs every 6 (six) hours as needed for Wheezing. Rescue 18 g 11    hydrocortisone 2.5 % cream Apply topically 2 (two) times daily. 28 g 1    levothyroxine (SYNTHROID) 50 MCG tablet TAKE 1 TABLET BY MOUTH BEFORE BREAKFAST. 90 tablet 3    rosuvastatin (CRESTOR) 20 MG tablet TAKE 1 TABLET BY MOUTH EVERY DAY 90 tablet 3    [DISCONTINUED] FLUoxetine 40 MG capsule Take 2 capsules (80 mg total) by mouth once daily. 60 capsule 1    [DISCONTINUED] traZODone (DESYREL) 50 MG tablet Take 1 tablet (50 mg total) by mouth every evening. 30 tablet 1    [START ON 12/16/2023] FLUoxetine 40 MG capsule Take 1 capsule (40 mg total) by mouth once daily. 30 capsule 1    [START ON 12/16/2023] traZODone (DESYREL) 50 MG tablet Take 1 tablet (50 mg total) by mouth every evening. 30 tablet 1    valACYclovir (VALTREX) 1000 MG tablet Take 2 tablets (2,000 mg total) by mouth 2 (two) times " daily. for 1 day (Patient not taking: Reported on 11/16/2023) 8 tablet 5     Facility-Administered Encounter Medications as of 11/16/2023   Medication Dose Route Frequency Provider Last Rate Last Admin    [DISCONTINUED] naltrexone microspheres kit SSRR 380 mg  380 mg Intramuscular Q28 Days Bandar Sheridan DO   380 mg at 09/21/23 1325      The chart was reviewed for recent diagnostic procedures and investigations, and pertinent results are noted below.    Wt Readings from Last 2 Encounters:   11/16/23 73.9 kg (162 lb 14.7 oz)   10/17/23 72.1 kg (159 lb)     BP Readings from Last 1 Encounters:   11/16/23 114/79     Pulse Readings from Last 1 Encounters:   11/16/23 62        Blood Counts, Electrolytes & Glucose: (i.e. WBC, ANC, Hemoglobin, Hematocrit, MCV, Platelets)  Lab Results   Component Value Date    WBC 6.62 03/02/2023    GRAN 4.4 03/02/2023    GRAN 65.7 03/02/2023    HGB 13.1 03/02/2023    HCT 40.7 03/02/2023    MCV 95 03/02/2023     03/02/2023     03/02/2023    K 3.6 03/02/2023    CALCIUM 9.8 03/02/2023    CO2 29 03/02/2023    ANIONGAP 8 03/02/2023    GLU 90 03/02/2023    HGBA1C 5.4 03/02/2023       Renal, Liver, Pancreas, Thyroid, Parathyroid, Prolactin, CPK, Lipids & Vitamin Levels: (i.e. Cr, BUN, Anion Gap, GFR, Urine Specific Gravity, Urine Protein, Microalburnin, AST, ALT, GGT, Alk Phos,Total Bili, Total Protein, Albumin, Ammonia, INR, Amylase, Lipase, TSH, Total T3, Total T4, Free T4 PTH, Prolactin, CPK, Cholesterol, Triglycerides, LDH, HDL, Vitamin B12, Folate, Vitamin D)  Lab Results   Component Value Date    CREATININE 0.8 03/02/2023    BUN 11 03/02/2023    EGFRNORACEVR >60.0 03/02/2023    SPECGRAV 1.020 08/24/2022    PROTEINUA Trace (A) 08/24/2022    AST 16 03/02/2023    ALT 14 03/02/2023    ALKPHOS 60 03/02/2023    BILITOT 0.6 03/02/2023    ALBUMIN 4.1 03/02/2023    INR 1.0 11/25/2016    TSH 0.485 10/19/2023    FREET4 0.90 08/24/2022    CHOL 149 03/02/2023    TRIG 68 03/02/2023  "   LDLCALC 90.4 03/02/2023    HDL 45 03/02/2023    KMATVJLK38 689 03/02/2023    FOLATE 13.5 02/17/2017    KGVUZWFX28NZ 42 05/02/2023       Infection Diseases, Pregnancy Screenings & Drug Levels: (i.e. Hepatitis Panel, HIV, Syphilis, Urine & Blood Pregnancy Screens, beta hCG, Lithium, Valproic Acid, Carbamazepine, Lamotrigine, Phenytoin, Phenobarbital, Clozapine, Norclozapine, Clozapine + Norclozapine)   Lab Results   Component Value Date    HEPCAB Negative 12/30/2020       Addiction: (i.e. Urine Toxicology, Blood Alcohol, PETH, EtG, EtS, CDT, Buprenorphine, Norbuprenorphine)  No results found for: "PCDSOALCOHOL", "PCDSOBENZOD", "BARBITURATES", "PCDSCOMETHA", "OPIATESCREEN", "COCAINEMETAB", "AMPHETAMINES", "MARIJUANATHC", "PCDSOPHENCYN", "PCDSUBUPRE", "PCDSUFENTANY", "PCDSUOXYCOD", "PCDSUTRAMA", "[id number]", "PETH", "ALCOHOLMEDIC", "THEYLGLUCU", "ETHYLSULF", "CDT", "BUPRENORPH", "NORBUPRENOR"    Results for orders placed or performed in visit on 01/04/18   EKG 12-lead    Collection Time: 01/04/18 10:13 AM    Narrative    Test Reason : Chest Pain  Blood Pressure : X/X mmHG  Vent. Rate : 088 BPM     Atrial Rate : 088 BPM     P-R Int : 150 ms          QRS Dur : 092 ms      QT Int : 374 ms       P-R-T Axes : 071 076 059 degrees     QTc Int : 452 ms    ECG storage only, no interpretation/report provided  Confirmed by STORAGE ONLY, NO REPORT (34),  Alecia Bailey (3) on  1/4/2018 2:50:04 PM    Referred By: KWAN MOREAU           Confirmed By:NO REPORT STORAGE ONLY            Billing Documentation:     Method of Encounter IN PERSON visit at the clinic   Type of Encounter Follow up visit with me   Counseling;  Psychotherapy    Counseling;  Tobacco and/or Nicotine    Additional Codes and Modifiers 68896, with modifer 59: administered and scored more than one psychological or neuropsychological tests (see MBC above) (16+ mins)   Time Remaining Chart/Pt 37148: FOLLOW UP VISIT, Rx mgmt, "Multiple STABLE chronic " "illnesses"   Total Mins  (11/16/2023) N/A - Not billing for time        Bandar Sheridan DO  Department of Psychiatry, [facility]        "

## 2024-02-02 ENCOUNTER — OFFICE VISIT (OUTPATIENT)
Dept: PSYCHIATRY | Facility: CLINIC | Age: 49
End: 2024-02-02
Payer: COMMERCIAL

## 2024-02-02 VITALS
SYSTOLIC BLOOD PRESSURE: 126 MMHG | DIASTOLIC BLOOD PRESSURE: 82 MMHG | HEIGHT: 67 IN | WEIGHT: 170.88 LBS | BODY MASS INDEX: 26.82 KG/M2 | HEART RATE: 67 BPM

## 2024-02-02 DIAGNOSIS — F10.91 ALCOHOL USE DISORDER IN REMISSION: ICD-10-CM

## 2024-02-02 DIAGNOSIS — F41.0 GENERALIZED ANXIETY DISORDER WITH PANIC ATTACKS: ICD-10-CM

## 2024-02-02 DIAGNOSIS — G47.00 INSOMNIA, UNSPECIFIED TYPE: ICD-10-CM

## 2024-02-02 DIAGNOSIS — F33.41 RECURRENT MAJOR DEPRESSIVE DISORDER, IN PARTIAL REMISSION: Primary | ICD-10-CM

## 2024-02-02 DIAGNOSIS — F17.200 NICOTINE DEPENDENCE DUE TO VAPING NON-TOBACCO PRODUCT: ICD-10-CM

## 2024-02-02 DIAGNOSIS — F41.1 GENERALIZED ANXIETY DISORDER WITH PANIC ATTACKS: ICD-10-CM

## 2024-02-02 DIAGNOSIS — Z15.89 HETEROZYGOUS MTHFR MUTATION C677T: ICD-10-CM

## 2024-02-02 PROCEDURE — 99999 PR PBB SHADOW E&M-EST. PATIENT-LVL III: CPT | Mod: PBBFAC,,, | Performed by: STUDENT IN AN ORGANIZED HEALTH CARE EDUCATION/TRAINING PROGRAM

## 2024-02-02 PROCEDURE — 99214 OFFICE O/P EST MOD 30 MIN: CPT | Mod: S$GLB,,, | Performed by: STUDENT IN AN ORGANIZED HEALTH CARE EDUCATION/TRAINING PROGRAM

## 2024-02-02 PROCEDURE — 1160F RVW MEDS BY RX/DR IN RCRD: CPT | Mod: CPTII,S$GLB,, | Performed by: STUDENT IN AN ORGANIZED HEALTH CARE EDUCATION/TRAINING PROGRAM

## 2024-02-02 PROCEDURE — 3008F BODY MASS INDEX DOCD: CPT | Mod: CPTII,S$GLB,, | Performed by: STUDENT IN AN ORGANIZED HEALTH CARE EDUCATION/TRAINING PROGRAM

## 2024-02-02 PROCEDURE — 3074F SYST BP LT 130 MM HG: CPT | Mod: CPTII,S$GLB,, | Performed by: STUDENT IN AN ORGANIZED HEALTH CARE EDUCATION/TRAINING PROGRAM

## 2024-02-02 PROCEDURE — 3079F DIAST BP 80-89 MM HG: CPT | Mod: CPTII,S$GLB,, | Performed by: STUDENT IN AN ORGANIZED HEALTH CARE EDUCATION/TRAINING PROGRAM

## 2024-02-02 PROCEDURE — 96136 PSYCL/NRPSYC TST PHY/QHP 1ST: CPT | Mod: 59,S$GLB,, | Performed by: STUDENT IN AN ORGANIZED HEALTH CARE EDUCATION/TRAINING PROGRAM

## 2024-02-02 PROCEDURE — 1159F MED LIST DOCD IN RCRD: CPT | Mod: CPTII,S$GLB,, | Performed by: STUDENT IN AN ORGANIZED HEALTH CARE EDUCATION/TRAINING PROGRAM

## 2024-02-02 RX ORDER — TRAZODONE HYDROCHLORIDE 50 MG/1
50 TABLET ORAL NIGHTLY
Qty: 30 TABLET | Refills: 1 | Status: SHIPPED | OUTPATIENT
Start: 2024-02-02 | End: 2024-04-01 | Stop reason: SDUPTHER

## 2024-02-02 RX ORDER — FLUOXETINE HYDROCHLORIDE 40 MG/1
40 CAPSULE ORAL DAILY
Qty: 30 CAPSULE | Refills: 1 | Status: SHIPPED | OUTPATIENT
Start: 2024-02-02 | End: 2024-04-04 | Stop reason: SDUPTHER

## 2024-02-02 RX ORDER — BUPROPION HYDROCHLORIDE 150 MG/1
150 TABLET ORAL DAILY
Qty: 30 TABLET | Refills: 1 | Status: SHIPPED | OUTPATIENT
Start: 2024-02-02 | End: 2024-04-01

## 2024-02-02 NOTE — PROGRESS NOTES
Outpatient Psychiatry Followup Visit (DO/MD/NP, etc.)    2/2/2024  Assessment & Plan    Assessment - Plan:     Impression     ICD-10-CM ICD-9-CM   1. Recurrent major depressive disorder, in partial remission  F33.41 296.35   2. Generalized anxiety disorder with panic attacks  F41.1 300.02    F41.0 300.01   3. Insomnia, unspecified type  G47.00 780.52   4. Nicotine dependence due to vaping non-tobacco product  F17.200 305.1   5. Alcohol use disorder in remission  F10.91 305.03   6. Heterozygous MTHFR mutation C677T  Z15.89 V84.89   7. BMI 26.0-26.9,adult  Z68.26 V85.22        Plan of Care & Medication Management    Chart was reviewed. The risks and benefits of medication were discussed with pt. The treatment plan and followup plan were reviewed with pt. Pt understands to contact clinic if symptoms worsen. Pt understands to call 911 or go to nearest ER for suicidal ideation, intent or plan.   RX History ATARAX/VISTARIL, CELEXA, CLONIDINE, DIPHENHYDRAMINE, EFFEXOR, ELAVIL, L-METHYLFOLATE, GABAPENTIN, NALTREXONE/VIVITROL, PROZAC, REMERON, RESTORIL, TRAZODONE, WELLBUTRIN, and ZOLOFT     Current RX Continue PROZAC  No excessive activation 7GSI3913, 23SYX8660  Adjustments:  73PDS4632: Reduce to 40mg daily  58ZSA5558: Increase to 80mg daily  90HKV8491: Increase to 60mg daily  04EOZ3655: Increase to 40mg daily  28ZCU8744: Increase to 20mg daily  59TWP4010: Start 10mg daily  Prior to evaluation pt had been taking EFFEXOR and WELLBUTRIN  Continue TRAZODONE  Adjustments:  91ZLL3720: Reduce to 50mg nightly  66JNK1445: Increase to 100mg nightly  27UZS4938: Adjust 50mg HS prn sleep  75UOT2667: Start 50mg HS  Prior to evaluation pt had been taking ATARAX  Start WELLBUTRIN  Pt was provided [employer] educational material 2/2/2024.  Adjustments:  65UBC7721: Start XL 150mg daily  Continue OTC L-METHYLFOLATE  C677T C/T  Adjustments:  01PSE4825: Increase to 15mg daily  27FPX0538: Start 7.5mg daily   Education & Counseling RX administration  "and adherence   Other Orders Continue individual psychotherapy   Monitor VITAL SIGNS  Use of: vaping/nicotine  Use of: alcohol (sober since FEB2023)  Instruments: PROMIS (A&D), PSS4   RETURN R: RETURN IN 8 WEEKS (TWO MONTHS), and reassess frequency within three visits from now  Continue medication management     Subjective    Interval History:     Available documentation has been reviewed, and pertinent elements of the chart have been incorporated into this note where appropriate. Last [facility] encounter with writer was on 11/16/2023   Ivory Cade, a 48 y.o. female, presenting for followup visit.      Since last visit, reports overall A LITTLE WORSE.    Mood swings. Irritability at night. Crying spells. Says it's "winter blues." Says will be seeing OBGYN for hormones.    NOT sleeping well recently due to dog.    Almost a year sober.    Concerned about weight gain.    Discussed adding WELLBUTRIN 150mg daily to better address depression.       Unless otherwise specified, pt did NOT display signs of nor endorse symptoms of overt psychosis or acute mood disorder requiring hospitalization during the encounter; pt denied violent thoughts or suicidal or homicidal ideation, intent, or plan.         Objective    Measurement-Based Care (MBC):     Routine Instruments   PROMIS-ANXIETY Interpretation: 9 (4a raw score): T-SCORE 57.7; MILD using 55-60-70 cutoffs. Last T-SCORE was 59.5. This PROMIS T-score change of 5 points or fewer indicates the score is probably stable.   PROMIS-DEPRESSION Interpretation: 7 (4a raw score): T-SCORE 53.9; NONE TO SLIGHT using 55-60-70 cutoffs. Last T-SCORE was 51.8. This PROMIS T-score change of 5 points or fewer indicates the score is probably stable.   PSS4 Interpretation: 9/16; MODERATE using 6-11 cutoffs. 1 PH, 0 LSE. Last PSS4 score was 6. This PSS4 score change of less than 4 points indicates the score is probably stable.   Additional Instruments   N/A     Current Evaluation of Mental " "Status:     Constitutional / General       Vitals:    02/02/24 0915   BP: 126/82   Pulse: 67   Weight: 77.5 kg (170 lb 13.7 oz)   Height: 5' 7" (1.702 m)       Psychiatric / Mental Status Examination  1. Appearance: Dress is informal but appropriate. Motor activity normal.  2. Discourse: Clear speech with normal rate and volume. Associations intact. Orderly.  3. Emotional Expression: Somewhat anxious and depressed mood. Affect is appropriate.  4. Perception and Thinking: No hallucinations. No suicidality, no homicidality, delusions, or paranoia.  5. Sensorium: Grossly intact. Able to focus for interview.  6. Memory and Fund of Knowledge: Intact for content of interview.  7. Insight and Judgment: Intact.               Billing Documentation:     Method of Encounter IN PERSON visit at the clinic   Type of Encounter Follow up visit with me   Counseling;  Psychotherapy    Counseling;  Tobacco and/or Nicotine    Additional Codes and Modifiers 11111, with modifer 59: administered and scored more than one psychological or neuropsychological tests (see MBC above) (16+ mins)   Time Remaining Chart/Pt 70141: FOLLOW UP VISIT, Rx mgmt, "Multiple STABLE chronic illnesses"   Total Mins  (2/2/2024) N/A - Not billing for time        Bandar Sheridan DO  Department of Psychiatry, [facility]        "

## 2024-02-08 ENCOUNTER — OFFICE VISIT (OUTPATIENT)
Dept: PSYCHIATRY | Facility: CLINIC | Age: 49
End: 2024-02-08
Payer: COMMERCIAL

## 2024-02-08 DIAGNOSIS — F41.1 GENERALIZED ANXIETY DISORDER: ICD-10-CM

## 2024-02-08 DIAGNOSIS — F33.1 MODERATE EPISODE OF RECURRENT MAJOR DEPRESSIVE DISORDER: ICD-10-CM

## 2024-02-08 DIAGNOSIS — R41.840 ATTENTION AND CONCENTRATION DEFICIT: Primary | ICD-10-CM

## 2024-02-08 PROCEDURE — 90834 PSYTX W PT 45 MINUTES: CPT | Mod: 95,,, | Performed by: SOCIAL WORKER

## 2024-02-08 PROCEDURE — 1159F MED LIST DOCD IN RCRD: CPT | Mod: CPTII,95,, | Performed by: SOCIAL WORKER

## 2024-02-12 NOTE — PROGRESS NOTES
The patient location is:  [place]  The patient location [place] is:  [place]  The patient phone number is:  [phone]   Visit type: Virtual visit with synchronous audio and video  Each patient to whom he or she provides medical services by telemedicine is:  (1) informed of the relationship between the physician and patient and the respective role of any other health care provider with respect to management of the patient; and (2) notified that he or she may decline to receive medical services by telemedicine and may withdraw from such care at any time.  Crisis Disclaimer: Patient was informed that due to the virtual nature of the visit, that if a crisis develops, protocols will be implemented to ensure patient safety, including but not limited to: 1) Initiating a welfare check with local Law Enforcement, 2) Calling 1/National Crisis Hotline, and/or 3) Initiating PEC/CEC procedures.    Individual Psychotherapy (PhD/LCSW)    2024    Site:  [facility]         Therapeutic Intervention: Met with patient.  Outpatient - Insight oriented psychotherapy 45 min - CPT code 50701, Outpatient - Behavior modifying psychotherapy 45 min - CPT code 41831, and Outpatient - Supportive psychotherapy 45 min - CPT Code 50809    Chief complaint/reason for encounter: depression and anxiety             Interval history and content of current session: Ct was referred to tx by Dr Sheridan to address AUD, depression, and anxiety. Ct arrived to session and was fully engaged. This session was completed virtually. Ct shared that a lot has gone on since her last visit with this SW. Ct shared that her mom  and that she got a new job. SW offered ct condolences. Ct shared that she was not sure how to grieve. SW and ct processed the importance of not putting expectations on grieving and that when a person grieves, they're paying homage to their loved one. Ct verbalized understanding. Ct reported that she likes her new job. She  reported that she continues to have trouble sleeping. SW and ct discussed the importance of having a sleep routine. Ct agreed to listen to do cool down routine. SW explained to the client that the regimen was the most important thing, not necessarily the timing due to ct's long hours. SW explained classical conditioning to the client and it's about re-training her brain for sleep. Ct was receptive and verbalized understanding. Ct to continue in 1:1 sessions.     Treatment plan:  Target symptoms: depression, anxiety , grief  Why chosen therapy is appropriate versus another modality: relevant to diagnosis, patient responds to this modality, evidence based practice  Outcome monitoring methods: self-report  Therapeutic intervention type: insight oriented psychotherapy, behavior modifying psychotherapy, supportive psychotherapy    Risk parameters:  Patient reports no suicidal ideation  Patient reports no homicidal ideation  Patient reports no self-injurious behavior  Patient reports no violent behavior    Mental Status Exam:  General Appearance:  unremarkable, age appropriate   Speech: normal tone, normal rate, normal pitch, normal volume      Level of Cooperation: cooperative      Thought Processes: normal and logical   Mood: sad      Thought Content: normal, no suicidality, no homicidality, delusions, or paranoia   Affect: appropriate   Orientation: Oriented x3   Memory: recent >  intact   Attention Span & Concentration: intact   Fund of General Knowledge: intact and appropriate to age and level of education   Abstract Reasoning: interpretation of similarities was abstract   Judgment & Insight: intact     Language intact       Verbal deficits: None    Patient's response to intervention:  The patient's response to intervention is accepting.    Progress toward goals and other mental status changes:  The patient's progress toward goals is fair .    Diagnosis:     ICD-10-CM ICD-9-CM   1. Attention and concentration deficit   R41.840 799.51   2. Moderate episode of recurrent major depressive disorder  F33.1 296.32   3. Generalized anxiety disorder  F41.1 300.02       Plan:  individual psychotherapy and ct to follow up with Dr. Sheridan for psych med mgt  Pt to go to ED or call 911 if symptoms worsen or if she has thoughts of harming self and/or others. Pt verbalized understanding.    Return to clinic: as scheduled    Length of Service (minutes): 45    A portion of this note was created using [employer] voice recognition software that occasionally misinterprets phrases or words.    Each patient to whom he or she provides medical services by telemedicine is: (1) informed of the relationship between the physician and patient and the respective role of any other health care provider with respect to management of the patient; and (2) notified that he or she may decline to receive medical services by telemedicine and may withdraw from such care at any time.

## 2024-03-06 ENCOUNTER — OFFICE VISIT (OUTPATIENT)
Dept: PSYCHIATRY | Facility: CLINIC | Age: 49
End: 2024-03-06
Payer: COMMERCIAL

## 2024-03-06 DIAGNOSIS — R41.840 ATTENTION AND CONCENTRATION DEFICIT: ICD-10-CM

## 2024-03-06 DIAGNOSIS — F34.1 PERSISTENT DEPRESSIVE DISORDER: Primary | ICD-10-CM

## 2024-03-06 PROCEDURE — 99999 PR PBB SHADOW E&M-EST. PATIENT-LVL II: CPT | Mod: PBBFAC,,, | Performed by: SOCIAL WORKER

## 2024-03-06 PROCEDURE — 1159F MED LIST DOCD IN RCRD: CPT | Mod: CPTII,S$GLB,, | Performed by: SOCIAL WORKER

## 2024-03-06 PROCEDURE — 90834 PSYTX W PT 45 MINUTES: CPT | Mod: S$GLB,,, | Performed by: SOCIAL WORKER

## 2024-03-06 NOTE — PROGRESS NOTES
Individual Psychotherapy (PhD/LCSW)    3/6/2024    Site:  [facility]         Therapeutic Intervention: Met with patient.  Outpatient - Insight oriented psychotherapy 45 min - CPT code 47770, Outpatient - Behavior modifying psychotherapy 45 min - CPT code 04960, and Outpatient - Supportive psychotherapy 45 min - CPT Code 22476    Chief complaint/reason for encounter: depression and anxiety             Interval history and content of current session: Ct was referred to tx by Dr Sheridan to address AUD, depression, and anxiety. Ct arrived to session and was fully engaged. Ct shared that she continues to have depressed mood. She shared that when she's working, she feels better, accomplished, as if she could get things done. She reported that when she's home, she does not want to do anything. SW and ct processed ACT- ct focusing on accepting her thoughts and feelings, not trying to change them. SW encouraged ct to focus on things that are in her control to change, her actions. Ct verbalized understanding. CT reported that she continues to have brain fog and trouble concentrating. SW and ct processed mindfulness techniques, ct using reminders, and ct using timers to help keep her on task. Ct verbalized understanding. Ct to continue in 1:1 sessions.       Treatment plan:  Target symptoms: depression, lack of focus, anxiety   Why chosen therapy is appropriate versus another modality: relevant to diagnosis, patient responds to this modality, evidence based practice  Outcome monitoring methods: self-report  Therapeutic intervention type: insight oriented psychotherapy, behavior modifying psychotherapy, supportive psychotherapy    Risk parameters:  Patient reports no suicidal ideation  Patient reports no homicidal ideation  Patient reports no self-injurious behavior  Patient reports no violent behavior    CSSRS was completed:     Mental Status Exam:  General Appearance:  unremarkable, age appropriate   Speech: normal tone, normal  rate, normal pitch, normal volume      Level of Cooperation: cooperative      Thought Processes: normal and logical   Mood: steady      Thought Content: normal, no suicidality, no homicidality, delusions, or paranoia   Affect: congruent and appropriate   Orientation: Oriented x3   Memory: recent >  intact   Attention Span & Concentration: intact   Fund of General Knowledge: intact and appropriate to age and level of education   Abstract Reasoning: interpretation of similarities was abstract   Judgment & Insight: intact     Language intact       Verbal deficits: None    Patient's response to intervention:  The patient's response to intervention is accepting.    Progress toward goals and other mental status changes:  The patient's progress toward goals is fair .    Diagnosis:     ICD-10-CM ICD-9-CM   1. Persistent depressive disorder  F34.1 300.4   2. Attention and concentration deficit  R41.840 799.51       Plan:  individual psychotherapy and ct to follow up with Dr. Sheridan for psych med mgt  Pt to go to ED or call 911 if symptoms worsen or if she has thoughts of harming self and/or others. Pt verbalized understanding.    Return to clinic: as scheduled    Length of Service (minutes): 45      A portion of this note was created using [employer] voice recognition software that occasionally misinterprets phrases or words.    Each patient to whom he or she provides medical services by telemedicine is: (1) informed of the relationship between the physician and patient and the respective role of any other health care provider with respect to management of the patient; and (2) notified that he or she may decline to receive medical services by telemedicine and may withdraw from such care at any time.

## 2024-03-11 NOTE — PROGRESS NOTES
2 pt identifiers used. Pt presented to clinic for lab draw. Successful on first attempt in [place]. Pt tolerated well.    Pt r/s appt for May. Pt is requesting TSH labs to be mailed to his home address so he can get them completed prior to his appt.    Please advise. Thank you!

## 2024-04-01 DIAGNOSIS — F41.1 GENERALIZED ANXIETY DISORDER WITH PANIC ATTACKS: ICD-10-CM

## 2024-04-01 DIAGNOSIS — F33.41 RECURRENT MAJOR DEPRESSIVE DISORDER, IN PARTIAL REMISSION: ICD-10-CM

## 2024-04-01 DIAGNOSIS — G47.00 INSOMNIA, UNSPECIFIED TYPE: ICD-10-CM

## 2024-04-01 DIAGNOSIS — F41.0 GENERALIZED ANXIETY DISORDER WITH PANIC ATTACKS: ICD-10-CM

## 2024-04-01 RX ORDER — TRAZODONE HYDROCHLORIDE 50 MG/1
50 TABLET ORAL NIGHTLY
Qty: 30 TABLET | Refills: 1 | Status: SHIPPED | OUTPATIENT
Start: 2024-04-01 | End: 2024-04-04 | Stop reason: SDUPTHER

## 2024-04-01 RX ORDER — BUPROPION HYDROCHLORIDE 150 MG/1
150 TABLET ORAL
Qty: 30 TABLET | Refills: 1 | Status: SHIPPED | OUTPATIENT
Start: 2024-04-01 | End: 2024-04-04 | Stop reason: SDUPTHER

## 2024-04-04 ENCOUNTER — OFFICE VISIT (OUTPATIENT)
Dept: PSYCHIATRY | Facility: CLINIC | Age: 49
End: 2024-04-04
Payer: COMMERCIAL

## 2024-04-04 VITALS
DIASTOLIC BLOOD PRESSURE: 70 MMHG | WEIGHT: 166 LBS | HEART RATE: 80 BPM | HEIGHT: 67 IN | SYSTOLIC BLOOD PRESSURE: 101 MMHG | BODY MASS INDEX: 26.06 KG/M2

## 2024-04-04 DIAGNOSIS — G47.00 INSOMNIA, UNSPECIFIED TYPE: ICD-10-CM

## 2024-04-04 DIAGNOSIS — Z15.89 HETEROZYGOUS MTHFR MUTATION C677T: ICD-10-CM

## 2024-04-04 DIAGNOSIS — F41.1 GENERALIZED ANXIETY DISORDER WITH PANIC ATTACKS: ICD-10-CM

## 2024-04-04 DIAGNOSIS — F41.0 GENERALIZED ANXIETY DISORDER WITH PANIC ATTACKS: ICD-10-CM

## 2024-04-04 DIAGNOSIS — F10.91 ALCOHOL USE DISORDER IN REMISSION: ICD-10-CM

## 2024-04-04 DIAGNOSIS — F17.200 NICOTINE DEPENDENCE DUE TO VAPING NON-TOBACCO PRODUCT: ICD-10-CM

## 2024-04-04 DIAGNOSIS — F33.42 RECURRENT MAJOR DEPRESSIVE DISORDER, IN FULL REMISSION: Primary | ICD-10-CM

## 2024-04-04 PROCEDURE — 1160F RVW MEDS BY RX/DR IN RCRD: CPT | Mod: CPTII,S$GLB,, | Performed by: STUDENT IN AN ORGANIZED HEALTH CARE EDUCATION/TRAINING PROGRAM

## 2024-04-04 PROCEDURE — 96136 PSYCL/NRPSYC TST PHY/QHP 1ST: CPT | Mod: 59,S$GLB,, | Performed by: STUDENT IN AN ORGANIZED HEALTH CARE EDUCATION/TRAINING PROGRAM

## 2024-04-04 PROCEDURE — 3074F SYST BP LT 130 MM HG: CPT | Mod: CPTII,S$GLB,, | Performed by: STUDENT IN AN ORGANIZED HEALTH CARE EDUCATION/TRAINING PROGRAM

## 2024-04-04 PROCEDURE — 1159F MED LIST DOCD IN RCRD: CPT | Mod: CPTII,S$GLB,, | Performed by: STUDENT IN AN ORGANIZED HEALTH CARE EDUCATION/TRAINING PROGRAM

## 2024-04-04 PROCEDURE — 99214 OFFICE O/P EST MOD 30 MIN: CPT | Mod: S$GLB,,, | Performed by: STUDENT IN AN ORGANIZED HEALTH CARE EDUCATION/TRAINING PROGRAM

## 2024-04-04 PROCEDURE — 3008F BODY MASS INDEX DOCD: CPT | Mod: CPTII,S$GLB,, | Performed by: STUDENT IN AN ORGANIZED HEALTH CARE EDUCATION/TRAINING PROGRAM

## 2024-04-04 PROCEDURE — 3078F DIAST BP <80 MM HG: CPT | Mod: CPTII,S$GLB,, | Performed by: STUDENT IN AN ORGANIZED HEALTH CARE EDUCATION/TRAINING PROGRAM

## 2024-04-04 PROCEDURE — 99999 PR PBB SHADOW E&M-EST. PATIENT-LVL III: CPT | Mod: PBBFAC,,, | Performed by: STUDENT IN AN ORGANIZED HEALTH CARE EDUCATION/TRAINING PROGRAM

## 2024-04-04 RX ORDER — BUPROPION HYDROCHLORIDE 150 MG/1
150 TABLET ORAL DAILY
Qty: 30 TABLET | Refills: 1 | Status: SHIPPED | OUTPATIENT
Start: 2024-04-04 | End: 2024-06-13 | Stop reason: SDUPTHER

## 2024-04-04 RX ORDER — FLUOXETINE HYDROCHLORIDE 40 MG/1
40 CAPSULE ORAL DAILY
Qty: 30 CAPSULE | Refills: 1 | Status: SHIPPED | OUTPATIENT
Start: 2024-04-04 | End: 2024-06-13 | Stop reason: SDUPTHER

## 2024-04-04 RX ORDER — TRAZODONE HYDROCHLORIDE 50 MG/1
50 TABLET ORAL NIGHTLY
Qty: 30 TABLET | Refills: 1 | Status: SHIPPED | OUTPATIENT
Start: 2024-04-04 | End: 2024-06-13 | Stop reason: SDUPTHER

## 2024-04-04 NOTE — PROGRESS NOTES
Outpatient Psychiatry Followup Visit (DO/MD/NP, etc.)    4/4/2024  Assessment & Plan    Assessment - Plan:     Impression     ICD-10-CM ICD-9-CM   1. Recurrent major depressive disorder, in full remission  F33.42 296.36   2. Generalized anxiety disorder with panic attacks  F41.1 300.02    F41.0 300.01   3. Insomnia, unspecified type  G47.00 780.52   4. Nicotine dependence due to vaping non-tobacco product  F17.200 305.1   5. Alcohol use disorder in remission  F10.91 305.03   6. Heterozygous MTHFR mutation C677T  Z15.89 V84.89   7. BMI 26.0-26.9,adult  Z68.26 V85.22        Plan of Care & Medication Management    Chart was reviewed. The risks and benefits of medication were discussed with pt. The treatment plan and followup plan were reviewed with pt. Pt understands to contact clinic if symptoms worsen. Pt understands to call 911 or go to nearest ER for suicidal ideation, intent or plan.   RX History ATARAX/VISTARIL, CELEXA, CLONIDINE, DIPHENHYDRAMINE, EFFEXOR, ELAVIL, L-METHYLFOLATE, GABAPENTIN, NALTREXONE/VIVITROL, PROZAC, REMERON, RESTORIL, TRAZODONE, WELLBUTRIN, and ZOLOFT     Current RX Continue PROZAC  No excessive activation [id number], 43ZKD6267  Adjustments:  50ZUT9512: Reduce to 40mg daily  59HKE1523: Increase to 80mg daily  07LWK5138: Increase to 60mg daily  44HSJ2414: Increase to 40mg daily  07XXW9248: Increase to 20mg daily  58HSG8018: Start 10mg daily  Prior to evaluation pt had been taking EFFEXOR and WELLBUTRIN  Continue TRAZODONE  Adjustments:  64PXV9312: Reduce to 50mg nightly  36OCH3209: Increase to 100mg nightly  32BQR8760: Adjust 50mg HS prn sleep  34LQZ8573: Start 50mg HS  Prior to evaluation pt had been taking ATARAX  Continue WELLBUTRIN  Pt was provided [employer] educational material 2/2/2024.  Adjustments:  08TEL0829: Start XL 150mg daily  Continue OTC L-METHYLFOLATE  C677T C/T  Adjustments:  96IJI6658: Increase to 15mg daily  73HRC3625: Start 7.5mg daily   Education & Counseling RX administration  "and adherence   Other Orders Continue individual psychotherapy   Monitor VITAL SIGNS  Use of: vaping/nicotine  Use of: alcohol (sober since FEB2023)  Instruments: PROMIS (A&D), PSS4   RETURN S: RETURN IN 8 WEEKS (TWO MONTHS), and reassess frequency within two visits from now  Continue medication management     Subjective    Interval History:     Available documentation has been reviewed, and pertinent elements of the chart have been incorporated into this note where appropriate. Last [facility] encounter with writer was on 2/2/2024   Ivory Cade, a 48 y.o. female, presenting for followup visit.      Since last visit, reports overall MUCH BETTER.    Mood is better. Fewer crying spells. Mood swings are better.    Parenting stress.    Likes WELLBUTRIN.    Vaping, precontemplation.    Brain fog - emphasized diet, exercise and getting good sleep. Encouraged exercising and improving sleep hygiene. Pt may take fish oil / omega 3 fatty acids.    Keeping therapy appts    Will continue medications as prescribed       Unless otherwise specified, pt did NOT display signs of nor endorse symptoms of overt psychosis or acute mood disorder requiring hospitalization during the encounter; pt denied violent thoughts or suicidal or homicidal ideation, intent, or plan.         Objective    Measurement-Based Care (MBC):     Routine Instruments   PROMIS-ANXIETY Interpretation: 10 (4a raw score): T-SCORE 59.5; MILD using 55-60-70 cutoffs.   PROMIS-DEPRESSION Interpretation: 6 (4a raw score): T-SCORE 51.8; NONE TO SLIGHT using 55-60-70 cutoffs.   PSS4 Interpretation: 9/16; MODERATE using 6-11 cutoffs.   Additional Instruments   N/A     Current Evaluation of Mental Status:     Constitutional / General       Vitals:    04/04/24 1119   BP: 101/70   Pulse: 80   Weight: 75.3 kg (166 lb 0.1 oz)   Height: 5' 7" (1.702 m)       Psychiatric / Mental Status Examination  1. Appearance: Dress is informal but appropriate. Motor activity normal.  2. " "Discourse: Clear speech with normal rate and volume. Associations intact. Orderly.  3. Emotional Expression: Euthymic mood with appropriate affect.  4. Perception and Thinking: No hallucinations. No suicidality, no homicidality, delusions, or paranoia.  5. Sensorium: Grossly intact. Able to focus for interview.  6. Memory and Fund of Knowledge: Intact for content of interview.  7. Insight and Judgment: Intact.               Billing Documentation:     Method of Encounter IN PERSON visit at the clinic   Type of Encounter Follow up visit with me   Counseling;  Psychotherapy    Counseling;  Tobacco and/or Nicotine    Additional Codes and Modifiers 09390, with modifer 59: administered and scored more than one psychological or neuropsychological tests (see MBC above) (16+ mins)   Time Remaining Chart/Pt 78110: FOLLOW UP VISIT, Rx mgmt, "Multiple STABLE chronic illnesses; no changes in treatment at this time"   Total Mins  (4/4/2024) N/A - Not billing for time        Bandar Sheridan DO  Department of Psychiatry, [facility]        "

## 2024-05-16 ENCOUNTER — PATIENT MESSAGE (OUTPATIENT)
Dept: FAMILY MEDICINE | Facility: CLINIC | Age: 49
End: 2024-05-16
Payer: COMMERCIAL

## 2024-05-16 ENCOUNTER — HOSPITAL ENCOUNTER (OUTPATIENT)
Dept: RADIOLOGY | Facility: HOSPITAL | Age: 49
Discharge: HOME | End: 2024-05-16
Attending: INTERNAL MEDICINE
Payer: COMMERCIAL

## 2024-05-16 DIAGNOSIS — I10 ESSENTIAL HYPERTENSION: ICD-10-CM

## 2024-05-16 DIAGNOSIS — E03.9 HYPOTHYROIDISM, UNSPECIFIED TYPE: Primary | ICD-10-CM

## 2024-05-16 DIAGNOSIS — R74.01 TRANSAMINITIS: ICD-10-CM

## 2024-05-16 DIAGNOSIS — K76.9 LIVER LESION: ICD-10-CM

## 2024-05-16 DIAGNOSIS — E55.9 VITAMIN D INSUFFICIENCY: ICD-10-CM

## 2024-05-16 DIAGNOSIS — Z00.00 PREVENTATIVE HEALTH CARE: ICD-10-CM

## 2024-05-16 PROCEDURE — 76700 US EXAM ABDOM COMPLETE: CPT | Mod: TC,PO

## 2024-05-16 PROCEDURE — 76700 US EXAM ABDOM COMPLETE: CPT | Mod: 26,,, | Performed by: STUDENT IN AN ORGANIZED HEALTH CARE EDUCATION/TRAINING PROGRAM

## 2024-05-19 ENCOUNTER — PATIENT MESSAGE (OUTPATIENT)
Dept: FAMILY MEDICINE | Facility: CLINIC | Age: 49
End: 2024-05-19
Payer: COMMERCIAL

## 2024-05-19 DIAGNOSIS — N76.0 ACUTE VAGINITIS: ICD-10-CM

## 2024-05-21 ENCOUNTER — E-VISIT (OUTPATIENT)
Dept: FAMILY MEDICINE | Facility: CLINIC | Age: 49
End: 2024-05-21
Payer: COMMERCIAL

## 2024-05-21 ENCOUNTER — LAB VISIT (OUTPATIENT)
Dept: LAB | Facility: HOSPITAL | Age: 49
End: 2024-05-21
Attending: INTERNAL MEDICINE
Payer: COMMERCIAL

## 2024-05-21 DIAGNOSIS — E03.9 HYPOTHYROIDISM, UNSPECIFIED TYPE: ICD-10-CM

## 2024-05-21 DIAGNOSIS — I10 ESSENTIAL HYPERTENSION: ICD-10-CM

## 2024-05-21 DIAGNOSIS — E55.9 VITAMIN D INSUFFICIENCY: ICD-10-CM

## 2024-05-21 DIAGNOSIS — N76.0 ACUTE VAGINITIS: Primary | ICD-10-CM

## 2024-05-21 DIAGNOSIS — Z00.00 PREVENTATIVE HEALTH CARE: ICD-10-CM

## 2024-05-21 DIAGNOSIS — R74.01 TRANSAMINITIS: ICD-10-CM

## 2024-05-21 LAB
25(OH)D3+25(OH)D2 SERPL-MCNC: 36 NG/ML (ref 30–96)
ALBUMIN SERPL BCP-MCNC: 4.3 G/DL (ref 3.5–5.2)
ALP SERPL-CCNC: 52 U/L (ref 55–135)
ALT SERPL W/O P-5'-P-CCNC: 23 U/L (ref 10–44)
ANION GAP SERPL CALC-SCNC: 9 MMOL/L (ref 8–16)
AST SERPL-CCNC: 17 U/L (ref 10–40)
BILIRUB SERPL-MCNC: 0.5 MG/DL (ref 0.1–1)
BUN SERPL-MCNC: 11 MG/DL (ref 6–20)
CALCIUM SERPL-MCNC: 10 MG/DL (ref 8.7–10.5)
CHLORIDE SERPL-SCNC: 105 MMOL/L (ref 95–110)
CHOLEST SERPL-MCNC: 127 MG/DL (ref 120–199)
CHOLEST/HDLC SERPL: 2.5 {RATIO} (ref 2–5)
CO2 SERPL-SCNC: 23 MMOL/L (ref 23–29)
CREAT SERPL-MCNC: 0.8 MG/DL (ref 0.5–1.4)
ERYTHROCYTE [DISTWIDTH] IN BLOOD BY AUTOMATED COUNT: 12.2 % (ref 11.5–14.5)
EST. GFR  (NO RACE VARIABLE): >60 ML/MIN/1.73 M^2
ESTIMATED AVG GLUCOSE: 100 MG/DL (ref 68–131)
GLUCOSE SERPL-MCNC: 85 MG/DL (ref 70–110)
HBA1C MFR BLD: 5.1 % (ref 4–5.6)
HCT VFR BLD AUTO: 38.8 % (ref 37–48.5)
HDLC SERPL-MCNC: 51 MG/DL (ref 40–75)
HDLC SERPL: 40.2 % (ref 20–50)
HGB BLD-MCNC: 13.1 G/DL (ref 12–16)
LDLC SERPL CALC-MCNC: 67.8 MG/DL (ref 63–159)
MCH RBC QN AUTO: 31.3 PG (ref 27–31)
MCHC RBC AUTO-ENTMCNC: 33.8 G/DL (ref 32–36)
MCV RBC AUTO: 93 FL (ref 82–98)
NONHDLC SERPL-MCNC: 76 MG/DL
PLATELET # BLD AUTO: 197 K/UL (ref 150–450)
PMV BLD AUTO: 11.2 FL (ref 9.2–12.9)
POTASSIUM SERPL-SCNC: 4 MMOL/L (ref 3.5–5.1)
PROT SERPL-MCNC: 7.1 G/DL (ref 6–8.4)
RBC # BLD AUTO: 4.18 M/UL (ref 4–5.4)
SODIUM SERPL-SCNC: 137 MMOL/L (ref 136–145)
TRIGL SERPL-MCNC: 41 MG/DL (ref 30–150)
TSH SERPL DL<=0.005 MIU/L-ACNC: 0.66 UIU/ML (ref 0.4–4)
WBC # BLD AUTO: 5.25 K/UL (ref 3.9–12.7)

## 2024-05-21 PROCEDURE — 85027 COMPLETE CBC AUTOMATED: CPT | Performed by: INTERNAL MEDICINE

## 2024-05-21 PROCEDURE — 84443 ASSAY THYROID STIM HORMONE: CPT | Performed by: INTERNAL MEDICINE

## 2024-05-21 PROCEDURE — 83036 HEMOGLOBIN GLYCOSYLATED A1C: CPT | Performed by: INTERNAL MEDICINE

## 2024-05-21 PROCEDURE — 80053 COMPREHEN METABOLIC PANEL: CPT | Performed by: INTERNAL MEDICINE

## 2024-05-21 PROCEDURE — 82306 VITAMIN D 25 HYDROXY: CPT | Performed by: INTERNAL MEDICINE

## 2024-05-21 PROCEDURE — 99422 OL DIG E/M SVC 11-20 MIN: CPT | Mod: ,,, | Performed by: STUDENT IN AN ORGANIZED HEALTH CARE EDUCATION/TRAINING PROGRAM

## 2024-05-21 PROCEDURE — 80061 LIPID PANEL: CPT | Performed by: INTERNAL MEDICINE

## 2024-05-21 PROCEDURE — 36415 COLL VENOUS BLD VENIPUNCTURE: CPT | Mod: PN | Performed by: INTERNAL MEDICINE

## 2024-05-21 RX ORDER — FLUCONAZOLE 150 MG/1
150 TABLET ORAL ONCE
Qty: 1 TABLET | Refills: 0 | Status: SHIPPED | OUTPATIENT
Start: 2024-05-21 | End: 2024-05-21 | Stop reason: SDUPTHER

## 2024-05-21 RX ORDER — METRONIDAZOLE 7.5 MG/G
1 GEL VAGINAL DAILY
Qty: 5 G | Refills: 0 | Status: SHIPPED | OUTPATIENT
Start: 2024-05-21 | End: 2024-05-26

## 2024-05-21 RX ORDER — FLUCONAZOLE 150 MG/1
150 TABLET ORAL ONCE
Qty: 1 TABLET | Refills: 0 | Status: SHIPPED | OUTPATIENT
Start: 2024-05-21 | End: 2024-05-21

## 2024-05-21 NOTE — PROGRESS NOTES
Patient ID: Ivory Cade is a 48 y.o. female.    Chief Complaint: Vaginal Discharge (Entered automatically based on patient selection in Patient Portal.)          274}  The patient initiated a request through [employer] on 5/21/2024 for evaluation and management with a chief complaint of Vaginal Discharge (Entered automatically based on patient selection in Patient Portal.)     I evaluated the questionnaire submission on 05/21/2024 .    Total Time (in minutes): 11     Oh[facility] Peq Evisit Vaginal Discharge    5/21/2024 11:55 AM CDT - Filed by Patient   Do you agree to participate in an E-Visit? Yes   If you have any of the following symptoms,  please do not complete an E-Visit,  schedule an appointment with your provider:    Choose the state of your primary residence [place]   What is the main issue you would like addressed today? Yeast infection   Are you able to take your vital signs? No   Are you pregnant, could you be pregnant, or are you breast feeding? None of the above   Which of the following are you experiencing? Vaginal Itching;  Vaginal discharge    Are you having pain while passing urine? No, I have no pain while urinating.   Which of the following applies to your vaginal discharge? I have a white/milky discharge.    Which of the following are you experiencing? None of the above   Do you have any sores on your genitals? No    Have you taken antibiotics recently? I have not been on any antibiotics    Do you use any of the following? None of the above   Which of the following applies to your menstrual period? I do not have menstrual periods   Have you had similar symptoms in the past? Yes, I have had similar symptoms once before.   When you had similar symptoms in the past, did any of the following work? Cream for yeast infection   Have you had a fever? No   During the last 2 months, have you had sexual contact with a specific person for the first time? No   Provide any additional information you feel is  important.    Please attach any relevant images or files           Active Problem List with Overview Notes    Diagnosis Date Noted    Focal nodular hyperplasia of liver 07/24/2023    Fatty liver 07/24/2023    BMI 26.0-26.9,adult 05/03/2023    Liver lesions 03/30/2023    Nicotine dependence due to vaping non-tobacco product 01/10/2023    Alcohol use disorder in remission 12/29/2022    Generalized anxiety disorder with panic attacks 12/29/2022    Insomnia 10/06/2020    Recurrent major depressive disorder, in full remission 11/16/2017    Onychomycosis due to dermatophyte 04/06/2017    Hallux abducto valgus 04/06/2017    Bursitis 04/06/2017    Pes valgus 04/06/2017    Neuroma 04/06/2017    Hallux limitus 04/06/2017    Asthma 01/26/2017    Heterozygous MTHFR mutation C677T 01/26/2017    Plantar fasciitis 07/24/2014    HLD (hyperlipidemia) 07/24/2014      Recent Labs Obtained:  Lab Results   Component Value Date    WBC 6.62 03/02/2023    HGB 13.1 03/02/2023    HCT 40.7 03/02/2023    MCV 95 03/02/2023     03/02/2023     03/02/2023    K 3.6 03/02/2023    GLU 90 03/02/2023    CREATININE 0.8 03/02/2023    EGFRNORACEVR >60.0 03/02/2023    HGBA1C 5.4 03/02/2023      Review of patient's allergies indicates:   Allergen Reactions    Penicillins Hives       Encounter Diagnosis   Name Primary?    Acute vaginitis Yes        No orders of the defined types were placed in this encounter.     Medications Ordered This Encounter   Medications    fluconazole (DIFLUCAN) 150 MG Tab     Sig: Take 1 tablet (150 mg total) by mouth once. for 1 dose     Dispense:  1 tablet     Refill:  0    metroNIDAZOLE (METROGEL VAGINAL) 0.75 % (37.5mg/5 gram) vaginal gel     Sig: Place 1 applicator vaginally once daily. for 5 days     Dispense:  5 g     Refill:  0        E-Visit Time Tracking:    Day 1 Time (in minutes): 11    Total Time (in minutes): 11      274}

## 2024-06-02 ENCOUNTER — PATIENT MESSAGE (OUTPATIENT)
Dept: PODIATRY | Facility: CLINIC | Age: 49
End: 2024-06-02
Payer: COMMERCIAL

## 2024-06-04 RX ORDER — METHYLPREDNISOLONE 4 MG/1
TABLET ORAL
Qty: 1 EACH | Refills: 0 | Status: SHIPPED | OUTPATIENT
Start: 2024-06-04 | End: 2024-06-25

## 2024-06-13 ENCOUNTER — OFFICE VISIT (OUTPATIENT)
Dept: PSYCHIATRY | Facility: CLINIC | Age: 49
End: 2024-06-13
Payer: COMMERCIAL

## 2024-06-13 VITALS
BODY MASS INDEX: 24.71 KG/M2 | HEIGHT: 67 IN | DIASTOLIC BLOOD PRESSURE: 69 MMHG | HEART RATE: 72 BPM | SYSTOLIC BLOOD PRESSURE: 103 MMHG | WEIGHT: 157.44 LBS

## 2024-06-13 DIAGNOSIS — F33.42 RECURRENT MAJOR DEPRESSIVE DISORDER, IN FULL REMISSION: Primary | ICD-10-CM

## 2024-06-13 DIAGNOSIS — G47.00 INSOMNIA, UNSPECIFIED TYPE: ICD-10-CM

## 2024-06-13 DIAGNOSIS — F17.200 NICOTINE DEPENDENCE DUE TO VAPING NON-TOBACCO PRODUCT: ICD-10-CM

## 2024-06-13 DIAGNOSIS — F41.1 GENERALIZED ANXIETY DISORDER WITH PANIC ATTACKS: ICD-10-CM

## 2024-06-13 DIAGNOSIS — Z15.89 HETEROZYGOUS MTHFR MUTATION C677T: ICD-10-CM

## 2024-06-13 DIAGNOSIS — F10.91 ALCOHOL USE DISORDER IN REMISSION: ICD-10-CM

## 2024-06-13 DIAGNOSIS — F41.0 GENERALIZED ANXIETY DISORDER WITH PANIC ATTACKS: ICD-10-CM

## 2024-06-13 PROCEDURE — 1160F RVW MEDS BY RX/DR IN RCRD: CPT | Mod: CPTII,S$GLB,, | Performed by: STUDENT IN AN ORGANIZED HEALTH CARE EDUCATION/TRAINING PROGRAM

## 2024-06-13 PROCEDURE — 3008F BODY MASS INDEX DOCD: CPT | Mod: CPTII,S$GLB,, | Performed by: STUDENT IN AN ORGANIZED HEALTH CARE EDUCATION/TRAINING PROGRAM

## 2024-06-13 PROCEDURE — 3074F SYST BP LT 130 MM HG: CPT | Mod: CPTII,S$GLB,, | Performed by: STUDENT IN AN ORGANIZED HEALTH CARE EDUCATION/TRAINING PROGRAM

## 2024-06-13 PROCEDURE — 99214 OFFICE O/P EST MOD 30 MIN: CPT | Mod: S$GLB,,, | Performed by: STUDENT IN AN ORGANIZED HEALTH CARE EDUCATION/TRAINING PROGRAM

## 2024-06-13 PROCEDURE — 96136 PSYCL/NRPSYC TST PHY/QHP 1ST: CPT | Mod: 59,S$GLB,, | Performed by: STUDENT IN AN ORGANIZED HEALTH CARE EDUCATION/TRAINING PROGRAM

## 2024-06-13 PROCEDURE — 3078F DIAST BP <80 MM HG: CPT | Mod: CPTII,S$GLB,, | Performed by: STUDENT IN AN ORGANIZED HEALTH CARE EDUCATION/TRAINING PROGRAM

## 2024-06-13 PROCEDURE — 99999 PR PBB SHADOW E&M-EST. PATIENT-LVL III: CPT | Mod: PBBFAC,,, | Performed by: STUDENT IN AN ORGANIZED HEALTH CARE EDUCATION/TRAINING PROGRAM

## 2024-06-13 PROCEDURE — 1159F MED LIST DOCD IN RCRD: CPT | Mod: CPTII,S$GLB,, | Performed by: STUDENT IN AN ORGANIZED HEALTH CARE EDUCATION/TRAINING PROGRAM

## 2024-06-13 PROCEDURE — 3044F HG A1C LEVEL LT 7.0%: CPT | Mod: CPTII,S$GLB,, | Performed by: STUDENT IN AN ORGANIZED HEALTH CARE EDUCATION/TRAINING PROGRAM

## 2024-06-13 RX ORDER — TRAZODONE HYDROCHLORIDE 50 MG/1
50 TABLET ORAL NIGHTLY
Qty: 30 TABLET | Refills: 1 | Status: SHIPPED | OUTPATIENT
Start: 2024-06-13 | End: 2024-06-13

## 2024-06-13 RX ORDER — FLUOXETINE HYDROCHLORIDE 40 MG/1
40 CAPSULE ORAL DAILY
Qty: 30 CAPSULE | Refills: 1 | Status: SHIPPED | OUTPATIENT
Start: 2024-06-13 | End: 2024-06-13

## 2024-06-13 RX ORDER — TRAZODONE HYDROCHLORIDE 50 MG/1
50 TABLET ORAL NIGHTLY
Qty: 30 TABLET | Refills: 2 | Status: SHIPPED | OUTPATIENT
Start: 2024-06-13 | End: 2024-09-11

## 2024-06-13 RX ORDER — BUPROPION HYDROCHLORIDE 150 MG/1
150 TABLET ORAL DAILY
Qty: 30 TABLET | Refills: 2 | Status: SHIPPED | OUTPATIENT
Start: 2024-06-13 | End: 2024-09-11

## 2024-06-13 RX ORDER — BUPROPION HYDROCHLORIDE 150 MG/1
150 TABLET ORAL DAILY
Qty: 30 TABLET | Refills: 1 | Status: SHIPPED | OUTPATIENT
Start: 2024-06-13 | End: 2024-06-13

## 2024-06-13 RX ORDER — FLUOXETINE HYDROCHLORIDE 40 MG/1
40 CAPSULE ORAL DAILY
Qty: 30 CAPSULE | Refills: 2 | Status: SHIPPED | OUTPATIENT
Start: 2024-06-13 | End: 2024-09-11

## 2024-06-13 NOTE — PROGRESS NOTES
Outpatient Psychiatry Followup Visit (DO/MD/NP, etc.)    6/13/2024  Assessment & Plan    Assessment - Plan:     Impression     ICD-10-CM ICD-9-CM   1. Recurrent major depressive disorder, in full remission  F33.42 296.36   2. Generalized anxiety disorder with panic attacks  F41.1 300.02    F41.0 300.01   3. Insomnia, unspecified type  G47.00 780.52   4. Nicotine dependence due to vaping non-tobacco product  F17.200 305.1   5. Alcohol use disorder in remission  F10.91 305.03   6. Heterozygous MTHFR mutation C677T  Z15.89 V84.89   7. BMI 24.0-24.9, adult  Z68.24 V85.1        Plan of Care & Medication Management    Chart was reviewed. The risks and benefits of medication were discussed with pt. The treatment plan and followup plan were reviewed with pt. Pt understands to contact clinic if symptoms worsen. Pt understands to call 911 or go to nearest ER for suicidal ideation, intent or plan.   RX History ATARAX/VISTARIL, CELEXA, CLONIDINE, DIPHENHYDRAMINE, EFFEXOR, ELAVIL, L-METHYLFOLATE, GABAPENTIN, NALTREXONE/VIVITROL, PROZAC, REMERON, RESTORIL, TRAZODONE, WELLBUTRIN, and ZOLOFT     Current RX Continue PROZAC  No excessive activation [id number], 24OMP1853  Adjustments:  32JGT3994: Reduce to 40mg daily  44EGS6499: Increase to 80mg daily  39WGZ3817: Increase to 60mg daily  72GIE0651: Increase to 40mg daily  47RLU7858: Increase to 20mg daily  29AWH4454: Start 10mg daily  Prior to evaluation pt had been taking EFFEXOR and WELLBUTRIN  Continue TRAZODONE  Adjustments:  35JOX7424: Reduce to 50mg nightly  94NUR1500: Increase to 100mg nightly  41UXH5544: Adjust 50mg HS prn sleep  20BZD5934: Start 50mg HS  Prior to evaluation pt had been taking ATARAX  Continue WELLBUTRIN  Pt was provided [employer] educational material 2/2/2024.  Adjustments:  91WBU1783: Start XL 150mg daily  Continue OTC L-METHYLFOLATE  C677T C/T  Adjustments:  69HPR5552: Increase to 15mg daily  27JGC3256: Start 7.5mg daily   Education & Counseling RX  "administration and adherence   Other Orders Continue individual psychotherapy   Monitor VITAL SIGNS  Use of: vaping/nicotine  Use of: alcohol (sober since FEB2023)  Instruments: PROMIS (A&D), PSS4   RETURN U: RETURN IN 12 WEEKS (THREE MONTHS), and reassess frequency within three visits from now  Continue medication management     Subjective    Interval History:     Available documentation has been reviewed, and pertinent elements of the chart have been incorporated into this note where appropriate. Last [facility] encounter with writer was on 4/4/2024   Ivory Cade, a 49 y.o. female, presenting for followup visit.          NO drinking over a year    Some "foggy thinking" and forgetfulness    Anxiety is stable.    HRT soon.  Expects to help cognitive complaints    Keeping therapy appts    Plan for cognitive testing next visit    Can reduce visit frequency to q3m    Will continue treatment otherwise as planned       Unless otherwise specified, pt did NOT display signs of nor endorse symptoms of overt psychosis or acute mood disorder requiring hospitalization during the encounter; pt denied violent thoughts or suicidal or homicidal ideation, intent, or plan.         Objective    Measurement-Based Care (MBC):     Routine Instruments   PROMIS-ANXIETY Interpretation: 10 (4a raw score): T-SCORE 59.5; MILD using 55-60-70 cutoffs.   PROMIS-DEPRESSION Interpretation: 5 (4a raw score): T-SCORE 49.0; NONE TO SLIGHT using 55-60-70 cutoffs.   PSS4 Interpretation: 12/16; HIGH using 6-11 cutoffs. 2 PH, 1 LSE. High perceived helplessness; pt strongly experiences a lack of control over responses to stress. Moderate lack of self-efficacy; pt moderately perceives an inability to handle problems.   Additional Instruments   N/A     Current Evaluation of Mental Status:     Constitutional / General       Vitals:    06/13/24 1138   BP: 103/69   Pulse: 72   Weight: 71.4 kg (157 lb 6.5 oz)   Height: 5' 7" (1.702 m)       Psychiatric / Mental " "Status Examination  1. Appearance: Dress is informal but appropriate. Motor activity normal.  2. Discourse: Clear speech with normal rate and volume. Associations intact. Orderly.  3. Emotional Expression: Somewhat anxious. Affect is appropriate.  4. Perception and Thinking: No hallucinations. No suicidality, no homicidality, delusions, or paranoia.  5. Sensorium: Grossly intact. Able to focus for interview.  6. Memory and Fund of Knowledge: Intact for content of interview.  7. Insight and Judgment: Intact.         Auto-populated chart data omitted from this note for brevity.      Billing Documentation:     Method of Encounter IN PERSON visit at the clinic   Type of Encounter Follow up visit with me   Counseling;  Psychotherapy    Counseling;  Tobacco and/or Nicotine    Additional Codes and Modifiers 42361, with modifer 59: administered and scored more than one psychological or neuropsychological tests (see MBC above) (16+ mins)   Time Remaining Chart/Pt 63472: FOLLOW UP VISIT, Rx mgmt, "Multiple STABLE chronic illnesses; no changes in treatment at this time"   Total Mins  (6/13/2024) N/A - Not billing for time        Bandar Sheridan DO  Department of Psychiatry, [facility]        "

## 2024-09-24 ENCOUNTER — OFFICE VISIT (OUTPATIENT)
Dept: PSYCHIATRY | Facility: CLINIC | Age: 49
End: 2024-09-24
Payer: COMMERCIAL

## 2024-09-24 VITALS
BODY MASS INDEX: 26.96 KG/M2 | HEIGHT: 67 IN | SYSTOLIC BLOOD PRESSURE: 124 MMHG | HEART RATE: 68 BPM | DIASTOLIC BLOOD PRESSURE: 75 MMHG | WEIGHT: 171.75 LBS

## 2024-09-24 DIAGNOSIS — F33.42 RECURRENT MAJOR DEPRESSIVE DISORDER, IN FULL REMISSION: Primary | ICD-10-CM

## 2024-09-24 DIAGNOSIS — Z15.89 HETEROZYGOUS MTHFR MUTATION C677T: ICD-10-CM

## 2024-09-24 DIAGNOSIS — F41.1 GENERALIZED ANXIETY DISORDER WITH PANIC ATTACKS: ICD-10-CM

## 2024-09-24 DIAGNOSIS — G47.00 INSOMNIA, UNSPECIFIED TYPE: ICD-10-CM

## 2024-09-24 DIAGNOSIS — F17.200 NICOTINE DEPENDENCE DUE TO VAPING NON-TOBACCO PRODUCT: ICD-10-CM

## 2024-09-24 DIAGNOSIS — F10.91 ALCOHOL USE DISORDER IN REMISSION: ICD-10-CM

## 2024-09-24 DIAGNOSIS — F41.0 GENERALIZED ANXIETY DISORDER WITH PANIC ATTACKS: ICD-10-CM

## 2024-09-24 PROCEDURE — 99999 PR PBB SHADOW E&M-EST. PATIENT-LVL III: CPT | Mod: PBBFAC,,, | Performed by: STUDENT IN AN ORGANIZED HEALTH CARE EDUCATION/TRAINING PROGRAM

## 2024-09-24 RX ORDER — BUPROPION HYDROCHLORIDE 300 MG/1
300 TABLET ORAL EVERY MORNING
Qty: 90 TABLET | Refills: 1 | Status: SHIPPED | OUTPATIENT
Start: 2024-09-24 | End: 2025-03-23

## 2024-09-24 RX ORDER — TRAZODONE HYDROCHLORIDE 50 MG/1
50 TABLET ORAL NIGHTLY
Qty: 90 TABLET | Refills: 1 | Status: SHIPPED | OUTPATIENT
Start: 2024-09-24 | End: 2025-03-23

## 2024-09-24 RX ORDER — FLUOXETINE HYDROCHLORIDE 40 MG/1
40 CAPSULE ORAL DAILY
Qty: 90 CAPSULE | Refills: 1 | Status: SHIPPED | OUTPATIENT
Start: 2024-09-24 | End: 2025-03-23

## 2024-09-24 RX ORDER — PROGESTERONE 200 MG/1
200 CAPSULE ORAL NIGHTLY
COMMUNITY
Start: 2024-09-22

## 2024-09-24 RX ORDER — ESTRADIOL 0.1 MG/D
1 FILM, EXTENDED RELEASE TRANSDERMAL
COMMUNITY
Start: 2024-09-15

## 2024-09-24 NOTE — PROGRESS NOTES
Outpatient Psychiatry Followup Visit (DO/MD/NP, etc.)    9/24/2024  Assessment & Plan    Assessment - Plan:     Impression     ICD-10-CM ICD-9-CM   1. Recurrent major depressive disorder, in full remission  F33.42 296.36   2. Generalized anxiety disorder with panic attacks  F41.1 300.02    F41.0 300.01   3. Insomnia, unspecified type  G47.00 780.52   4. Nicotine dependence due to vaping non-tobacco product  F17.200 305.1   5. Alcohol use disorder in remission  F10.91 305.03   6. Heterozygous MTHFR mutation C677T  Z15.89 V84.89   7. BMI 26.0-26.9,adult  Z68.26 V85.22      Plan of Care & Medication Management    Chart was reviewed. The risks and benefits of medication were discussed with pt. The treatment plan and followup plan were reviewed with pt. Pt understands to contact clinic if symptoms worsen. Pt understands to call 911 or go to nearest ER for suicidal ideation, intent or plan.   RX History ATARAX/VISTARIL, CELEXA, CLONIDINE, DIPHENHYDRAMINE, EFFEXOR, ELAVIL, L-METHYLFOLATE, GABAPENTIN, NALTREXONE/VIVITROL, PROZAC, REMERON, RESTORIL, TRAZODONE, WELLBUTRIN, and ZOLOFT     Current RX Continue PROZAC  No excessive activation [id number], [id number]  Adjustments:  36BIC9347: Reduce to 40mg daily  64HSZ7257: Increase to 80mg daily  30VFR5911: Increase to 60mg daily  34WHF1616: Increase to 40mg daily  86OFL8175: Increase to 20mg daily  09TEV7826: Start 10mg daily  Prior to evaluation pt had been taking EFFEXOR and WELLBUTRIN  Continue TRAZODONE  Adjustments:  88RRJ4510: Reduce to 50mg nightly  87DEU7189: Increase to 100mg nightly  47AJA8955: Adjust 50mg HS prn sleep  95ZFT4272: Start 50mg HS  Prior to evaluation pt had been taking ATARAX  Increase WELLBUTRIN  Pt was provided [facility] educational material 2/2/2024.  Adjustments:  9/24/2024: Increase to 300mg qam  52GPH2782: Start XL 150mg daily  Continue OTC L-METHYLFOLATE  C677T C/T  Adjustments:  53ZTH0111: Increase to 15mg daily  15OBK8454: Start 7.5mg daily     "  Education, Counseling & Monitoring []BOX BREATHING  []SLEEP HYGIENE  []THERAPY  [] []CONTRACT 9/24/2024?  [] REVIEWED 9/24/2024?  []NRT  []LABS    []DARRYL  []CAFF   []CANNABIS  []ALO []ETOH []GDS []MINICOG []MOCA  []AIMS []ASRS []BI   []PCL5 []L2RTB  []   Other Orders Continue individual psychotherapy   RETURN V: RETURN IN 12 WEEKS (THREE MONTHS), and reassess frequency within two visits from now  Continue medication management     Subjective    Interval History:     Available documentation has been reviewed, and pertinent elements of the chart have been incorporated into this note where appropriate. Last Epic encounter with writer was on 6/13/2024   Ivory Cade, a 49 y.o. female, presenting for followup visit. This visit was done in person, IN CLINIC.    "Overall, how is your mental health now, compared to our last visit?"   []much better []a little better []about the same []a little worse []much worse     Started HRT  Concerned about 20lb weight gain over last 2m  Stress at home  Less social engagement  Concentration is poor  Brain fog    Denies anhedonia  Denies depressed mood most of the time  NO crying spells  Anxiety is controlled   Sleeping well    Discussed increasing WELLBUTRIN XL to 300mg to better address cognitive symptoms    Completed [facility] survey questions.    Pt was encouraged to keep therapy appointments  Will continue treatment otherwise as planned       Unless otherwise specified, pt did NOT display signs of nor endorse symptoms of overt psychosis or acute mood disorder requiring hospitalization during the encounter; pt denied violent thoughts or suicidal or homicidal ideation, intent, or plan.         Objective    Measurement-Based Care (MBC):     Routine Instruments   PROMIS-ANXIETY Interpretation: 8 (4a raw score): T-SCORE 55.8; MILD using 55-60-70 cutoffs.   PROMIS-DEPRESSION Interpretation: 6 (4a raw score): T-SCORE 51.8; NONE TO SLIGHT using 55-60-70 cutoffs.   PSS4 Interpretation: " "9/16; MODERATE using 6-11 cutoffs. 1 PH, 0 LSE. Moderate perceived helplessness; pt moderately experiences a lack of control over responses to stress.   Additional Instruments   N/A     Current Evaluation of Mental Status:     Constitutional / General       Vitals:    09/24/24 1037   BP: 124/75   Pulse: 68   Weight: 77.9 kg (171 lb 11.8 oz)   Height: 5' 7" (1.702 m)     (Current body mass index is 26.9 kg/m².)    Psychiatric / Mental Status Examination  1. Appearance: Dress is informal but appropriate. Motor activity normal.  2. Discourse: Clear speech with normal rate and volume. Associations intact. Orderly.  3. Emotional Expression: Euthymic mood with appropriate affect.  4. Perception and Thinking: No hallucinations. No suicidality, no homicidality, delusions, or paranoia.  5. Sensorium: Grossly intact. Able to focus for interview.  6. Memory and Fund of Knowledge: Intact for content of interview.  7. Insight and Judgment: Intact.         Auto-populated Chart Data:     The chart was reviewed for recent diagnostic procedures and investigations, and pertinent results are noted below.   Encounter Medications  Outpatient Encounter Medications as of 9/24/2024   Medication Sig Dispense Refill    albuterol (PROVENTIL/VENTOLIN HFA) 90 mcg/actuation inhaler Inhale 2 puffs into the lungs every 6 (six) hours as needed for Wheezing. Rescue 18 g 11    estradioL (VIVELLE-DOT) 0.1 mg/24 hr PTSW Place 1 patch onto the skin twice a week.      hydrocortisone 2.5 % cream Apply topically 2 (two) times daily. 28 g 1    levothyroxine (SYNTHROID) 50 MCG tablet TAKE 1 TABLET BY MOUTH BEFORE BREAKFAST. 90 tablet 3    progesterone (PROMETRIUM) 200 MG capsule Take 200 mg by mouth every evening.      rosuvastatin (CRESTOR) 20 MG tablet TAKE 1 TABLET BY MOUTH EVERY DAY 90 tablet 3    [DISCONTINUED] buPROPion (WELLBUTRIN XL) 150 MG TB24 tablet Take 1 tablet (150 mg total) by mouth once daily. 30 tablet 2    [DISCONTINUED] FLUoxetine 40 MG " capsule Take 1 capsule (40 mg total) by mouth once daily. 30 capsule 2    [DISCONTINUED] traZODone (DESYREL) 50 MG tablet Take 1 tablet (50 mg total) by mouth every evening. 30 tablet 2    buPROPion (WELLBUTRIN XL) 300 MG 24 hr tablet Take 1 tablet (300 mg total) by mouth every morning. 90 tablet 1    FLUoxetine 40 MG capsule Take 1 capsule (40 mg total) by mouth once daily. 90 capsule 1    traZODone (DESYREL) 50 MG tablet Take 1 tablet (50 mg total) by mouth every evening. 90 tablet 1    valACYclovir (VALTREX) 1000 MG tablet Take 2 tablets (2,000 mg total) by mouth 2 (two) times daily. for 1 day 8 tablet 5     No facility-administered encounter medications on file as of 9/24/2024.      Cardiometabolic  EKG Results  Results for orders placed or performed in visit on 01/04/18   EKG 12-lead    Collection Time: 01/04/18 10:13 AM    Narrative    Test Reason : Chest Pain  Blood Pressure : X/X mmHG  Vent. Rate : 088 BPM     Atrial Rate : 088 BPM     P-R Int : 150 ms          QRS Dur : 092 ms      QT Int : 374 ms       P-R-T Axes : 071 076 059 degrees     QTc Int : 452 ms      Confirmed by STORAGE ONLY, NO REPORT (34),  Alecia Bailey (3) on  1/4/2018 2:50:04 PM    Referred By: KWAN MOREAU           Confirmed By:NO REPORT STORAGE ONLY        Labs  Lab Results   Component Value Date    TSH 0.659 05/21/2024    FREET4 0.90 08/24/2022    GLU 85 05/21/2024    HGBA1C 5.1 05/21/2024    CHOL 127 05/21/2024    TRIG 41 05/21/2024    HDL 51 05/21/2024    LDLCALC 67.8 05/21/2024       BMI Trend - (Current body mass index is 26.9 kg/m².)  Wt Readings from Last 5 Encounters:   09/24/24 77.9 kg (171 lb 11.8 oz)   06/13/24 71.4 kg (157 lb 6.5 oz)   05/07/24 75.3 kg (166 lb)   04/04/24 75.3 kg (166 lb 0.1 oz)   02/02/24 77.5 kg (170 lb 13.7 oz)       BP/HR Trend   BP Readings from Last 3 Encounters:   09/24/24 124/75   06/13/24 103/69   04/04/24 101/70      Pulse Readings from Last 3 Encounters:   09/24/24 68   06/13/24 72  "  04/04/24 80       Endocrine Lab Results   Component Value Date    TSH 0.659 05/21/2024    FREET4 0.90 08/24/2022      Hematologic   Lab Results   Component Value Date    WBC 5.25 05/21/2024    RBC 4.18 05/21/2024    HGB 13.1 05/21/2024    HCT 38.8 05/21/2024    MCV 93 05/21/2024    MCH 31.3 (H) 05/21/2024    RDW 12.2 05/21/2024     05/21/2024    MPV 11.2 05/21/2024    GRAN 4.4 03/02/2023    GRAN 65.7 03/02/2023      Hepatorenal Lab Results   Component Value Date     05/21/2024    K 4.0 05/21/2024    CALCIUM 10.0 05/21/2024    CO2 23 05/21/2024    ANIONGAP 9 05/21/2024    CREATININE 0.8 05/21/2024    BUN 11 05/21/2024    EGFRNORACEVR >60.0 05/21/2024    SPECGRAV 1.020 08/24/2022    PROTEINUA Trace (A) 08/24/2022    AST 17 05/21/2024    ALT 23 05/21/2024    BILITOT 0.5 05/21/2024    ALBUMIN 4.3 05/21/2024    INR 1.0 11/25/2016      Medication Level No results found for: "LITHIUM", "VALPROATE", "CBMZ", "CARBAMAZ", "LAMOTRIGINE", "PHENYTOIN", "PHENOBARB", "CLOZAPINE", "NORCLOZAP", "CLOZNORCLOZ", "CLONAZEPAM", "CHRIS", "VENLAFAXINE", "NORTRIP", "OXCARBAZ"   Other Labs Lab Results   Component Value Date    BHPOKNDF67 689 03/02/2023    CINIWPZZ95MA 36 05/21/2024    HEPCAB Negative 12/30/2020      Drug Screening   AMP * (*) METHAMP * (*)   ALEJANDRA * (*) MORPH * (*)   BUP * (*) OXY * (*)   BENZO * (*) METHADONE * (*)   ROSE * (*) THC * (*)   HX No results found for: "AMPHETAMINES", "PCDSOPHENCYN", "COCAINEMETAB", "POCCOC", "COCAINE", "ROSE", "COCAINEURINE", "POCCOCDRUG", "PCDSUTRAMA", "PCDSOBENZOD", "BARBITURATES", "PCDSCOMETHA", "OPIATESCREEN", "PCDSUBUPRE", "PCDSUFENTANY", "PCDSUOXYCOD", "BUPRENORPH", "NORBUPRENOR", "MARIJUANATHC"  No results found for: "PCDSOALCOHOL", "ALCOHOLMEDIC", "THEYLGLUCU", "ETHYLSULF", "NIKO82010", "PETH"         Billing Documentation:     Method of Encounter IN PERSON visit at the clinic   Type of Encounter Follow up visit with me   Counseling;  Psychotherapy    Counseling;  Tobacco " "and/or Nicotine    Additional Codes and Modifiers 77106, with modifer 59: administered and scored more than one psychological or neuropsychological tests (see MBC above) (16+ mins)  , with modifier 33: administered and scored social determinants of health (5-15 mins)  , without modifiers -24,-25,-53: COMPLEXITY: Visit today included increased complexity associated with the care of the episodic problem(s) (multiple psychiatric disorders - see above) addressed and managing the longitudinal care of the patient due to the serious and/or complex managed problem(s) (multiple psychiatric disorders - see above).   Time Remaining Chart/Pt 39428: FOLLOW UP VISIT, Rx mgmt, "Multiple STABLE chronic illnesses"   Total Mins  (9/24/2024) N/A - Not billing for time        Bandar Sheridan DO  Department of Psychiatry, [facility]        "

## 2024-09-24 NOTE — PATIENT INSTRUCTIONS
Adjust WELLBUTRIN XL to 300mg every morning    Continue other medications as prescribed   Please keep therapy appointments

## 2024-09-27 ENCOUNTER — OFFICE VISIT (OUTPATIENT)
Dept: PSYCHIATRY | Facility: CLINIC | Age: 49
End: 2024-09-27
Payer: COMMERCIAL

## 2024-09-27 DIAGNOSIS — F41.9 ANXIETY: ICD-10-CM

## 2024-09-27 DIAGNOSIS — F34.1 PERSISTENT DEPRESSIVE DISORDER: Primary | ICD-10-CM

## 2024-09-27 DIAGNOSIS — R41.840 ATTENTION AND CONCENTRATION DEFICIT: ICD-10-CM

## 2024-09-27 PROCEDURE — 1159F MED LIST DOCD IN RCRD: CPT | Mod: CPTII,S$GLB,, | Performed by: SOCIAL WORKER

## 2024-09-27 PROCEDURE — 90837 PSYTX W PT 60 MINUTES: CPT | Mod: S$GLB,,, | Performed by: SOCIAL WORKER

## 2024-09-27 PROCEDURE — 3044F HG A1C LEVEL LT 7.0%: CPT | Mod: CPTII,S$GLB,, | Performed by: SOCIAL WORKER

## 2024-09-27 PROCEDURE — 99999 PR PBB SHADOW E&M-EST. PATIENT-LVL II: CPT | Mod: PBBFAC,,, | Performed by: SOCIAL WORKER

## 2024-09-30 ENCOUNTER — PATIENT MESSAGE (OUTPATIENT)
Dept: PSYCHIATRY | Facility: CLINIC | Age: 49
End: 2024-09-30
Payer: COMMERCIAL

## 2024-09-30 DIAGNOSIS — F41.1 GENERALIZED ANXIETY DISORDER: ICD-10-CM

## 2024-09-30 DIAGNOSIS — F34.1 PERSISTENT DEPRESSIVE DISORDER: Primary | ICD-10-CM

## 2024-10-01 NOTE — PROGRESS NOTES
Individual Psychotherapy (PhD/LCSW)    9/27/2024    Site:  [place]         Therapeutic Intervention: Met with patient.  Outpatient - Insight oriented psychotherapy 60 min - CPT code 98728, Outpatient - Behavior modifying psychotherapy 60 min - CPT code 19849, and Outpatient - Supportive psychotherapy 60 min - CPT Code 31524    Chief complaint/reason for encounter: depression, anxiety, and AUD             Interval history and content of current session: Ct was referred to tx by Dr Sheridan to address AUD, depression, and anxiety. Ct arrived to session and was fully engaged. Ct shared that she continues to have depressed mood and is more or less coming to terms that this is how things will be. Ct shared that she continues to have issues with concentration and focusing. SW and ct discussed ADHD testing. SW provided ct with coping skills to help with concentration and focus and also provided ct with information on MARTY for ADD. SW will place order for ADHD testing in [place] office. Ct to continue in 1:1 sessions.       Treatment plan:  Target symptoms: recurrent depression, anxiety , concentration  Why chosen therapy is appropriate versus another modality: relevant to diagnosis, patient responds to this modality, evidence based practice  Outcome monitoring methods: self-report  Therapeutic intervention type: insight oriented psychotherapy, behavior modifying psychotherapy, supportive psychotherapy    Risk parameters:  Patient reports no suicidal ideation  Patient reports no homicidal ideation  Patient reports no self-injurious behavior  Patient reports no violent behavior    CSSRS was completed:     Mental Status Exam:  General Appearance:  unremarkable, age appropriate   Speech: normal tone, normal rate, normal pitch, normal volume      Level of Cooperation: cooperative      Thought Processes: normal and logical   Mood: steady, depressed      Thought Content: normal, no suicidality, no homicidality, delusions, or  paranoia   Affect: appropriate   Orientation: Oriented x3   Memory: recent >  intact   Attention Span & Concentration: intact   Fund of General Knowledge: intact and appropriate to age and level of education   Abstract Reasoning: interpretation of similarities was abstract   Judgment & Insight: fair, intact     Language intact       Verbal deficits: None    Patient's response to intervention:  The patient's response to intervention is accepting.    Progress toward goals and other mental status changes:  The patient's progress toward goals is fair .    Diagnosis:     ICD-10-CM ICD-9-CM   1. Persistent depressive disorder  F34.1 300.4   2. Attention and concentration deficit  R41.840 799.51   3. Anxiety  F41.9 300.00       Plan:  individual psychotherapy and ct to follow up Dr. Sheridan for psych med mgt  Pt to go to ED or call 911 if symptoms worsen or if she has thoughts of harming self and/or others. Pt verbalized understanding.    Return to clinic: as scheduled    Length of Service (minutes): 60      A portion of this note was created using [employer] voice recognition software that occasionally misinterprets phrases or words.    Each patient to whom he or she provides medical services by telemedicine is: (1) informed of the relationship between the physician and patient and the respective role of any other health care provider with respect to management of the patient; and (2) notified that he or she may decline to receive medical services by telemedicine and may withdraw from such care at any time.

## 2024-10-25 ENCOUNTER — PATIENT MESSAGE (OUTPATIENT)
Dept: PSYCHIATRY | Facility: CLINIC | Age: 49
End: 2024-10-25
Payer: COMMERCIAL

## 2024-11-14 ENCOUNTER — OFFICE VISIT (OUTPATIENT)
Dept: PSYCHIATRY | Facility: CLINIC | Age: 49
End: 2024-11-14
Payer: COMMERCIAL

## 2024-11-14 VITALS
WEIGHT: 167.13 LBS | HEIGHT: 67 IN | BODY MASS INDEX: 26.23 KG/M2 | DIASTOLIC BLOOD PRESSURE: 72 MMHG | HEART RATE: 84 BPM | SYSTOLIC BLOOD PRESSURE: 106 MMHG

## 2024-11-14 DIAGNOSIS — F33.42 RECURRENT MAJOR DEPRESSIVE DISORDER, IN FULL REMISSION: Primary | ICD-10-CM

## 2024-11-14 DIAGNOSIS — F34.1 PERSISTENT DEPRESSIVE DISORDER: ICD-10-CM

## 2024-11-14 DIAGNOSIS — F41.1 GENERALIZED ANXIETY DISORDER: ICD-10-CM

## 2024-11-14 PROCEDURE — 3078F DIAST BP <80 MM HG: CPT | Mod: CPTII,S$GLB,, | Performed by: PSYCHOLOGIST

## 2024-11-14 PROCEDURE — 3044F HG A1C LEVEL LT 7.0%: CPT | Mod: CPTII,S$GLB,, | Performed by: PSYCHOLOGIST

## 2024-11-14 PROCEDURE — 90792 PSYCH DIAG EVAL W/MED SRVCS: CPT | Mod: S$GLB,,, | Performed by: PSYCHOLOGIST

## 2024-11-14 PROCEDURE — 1159F MED LIST DOCD IN RCRD: CPT | Mod: CPTII,S$GLB,, | Performed by: PSYCHOLOGIST

## 2024-11-14 PROCEDURE — 3074F SYST BP LT 130 MM HG: CPT | Mod: CPTII,S$GLB,, | Performed by: PSYCHOLOGIST

## 2024-11-14 PROCEDURE — 99999 PR PBB SHADOW E&M-EST. PATIENT-LVL IV: CPT | Mod: PBBFAC,,, | Performed by: PSYCHOLOGIST

## 2024-11-14 NOTE — PROGRESS NOTES
Outpatient Psychiatric Initial Visit  11/14/2024     ID:   Patient presents for an initial evaluation.      Reason for encounter: Referral from Didi Storm LCSW     Chief Complaint: ADHD evaluation, depression    History of Presenting Illness:  Pt described an idyllic childhood raised by biological,  mother and father. Pt said that the household was happy and pt was well cared for. Pt denied any trauma or abuse in or outside of the household. Pt did well academically until [employer]. Pt said that she made A's and B's and did well academically. Pt noted anxiety as a child that was untreated. Pt noted difficulty paying attention in class in HS and college but also noted excessive drinking and poor effort. 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 Pt's therapist recommended she get tested for ADHD.     Of note, pt's mental health is somewhat better managed now. Pt stopped drinking two years ago after years of misuse.    Depression symptoms: not fully assessed but current and longstanding depressive episodes     Anxiety symptoms:  Pt denied symptoms consistent with ROSALVA, OCD, panic, phobias, or social anxiety.     Brenda/Hypomania Symptoms: Pt denied current or history of related symptoms.    Psychosis Symptoms: Pt denied current or history of related symptoms.    Attention/Concentration Symptoms: Pt reports attention/concentration concerns. Pt explained that her symptoms were present before the age of 11 and are cause impairment in more than one setting. Pt endorsed the following symptoms:    Inattentive:  Often has trouble holding attention on tasks or play activities.  Often does not seem to listen when spoken to directly.  Often does not follow through on instructions and fails to finish schoolwork, chores, or duties in the workplace (e.g., loses focus, side-tracked).  Often has trouble organizing tasks and activities.  Is often  easily distracted  Is often forgetful in daily activities.  Hyperactive  None    Disordered Eating/Body Image Concerns: Pt denied current or history of related symptoms.    Suicidal Ideation and Risk: Pt denied current or history of related symptoms.    Homicidal/Violent Ideation and Risk: Pt denied current or history of related symptoms.    Criminal History: Pt denied.    Prior Psychiatric Treatment/Hospitalizations: Pt denied.     Current psychiatric medication: Prozac 40mg Q D; Trazodone 50mg Q HS; Wellbutrin XL 300mg Q AM    Prior psychiatric medication trials: not assessed this session    Current Medical Conditions Per Chart Review:   Patient Active Problem List   Diagnosis    Plantar fasciitis    HLD (hyperlipidemia)    Asthma    Heterozygous MTHFR mutation C677T    Onychomycosis due to dermatophyte    Hallux abducto valgus    Bursitis    Pes valgus    Neuroma    Hallux limitus    Recurrent major depressive disorder, in full remission    Insomnia    Alcohol use disorder in remission    Generalized anxiety disorder with panic attacks    Nicotine dependence due to vaping non-tobacco product    Liver lesions    BMI 26.0-26.9,adult    Focal nodular hyperplasia of liver    Fatty liver    BMI 24.0-24.9, adult      Family Psychiatric History:  mother - depression    Alcohol Use: Pt is two years sober.    Tobacco and Drug Use: Pt vapes daily and denied drug use.    Social History:  Pt is  and has a 12 and 14 year old kids live with her. 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 Pt is sober from alcohol but vapes daily.     Trauma history:  pt denied     Mental Status Exam      Physical Exam  Psychiatric:         Attention and Perception: Attention normal.         Mood and Affect: Mood is anxious and depressed.         Speech: Speech normal.         Behavior: Behavior normal. Behavior is cooperative.         Thought Content: Thought content normal. Thought content is not paranoid or delusional. Thought content does  not include homicidal or suicidal ideation. Thought content does not include homicidal or suicidal plan.         Cognition and Memory: Cognition and memory normal.         Judgment: Judgment normal.      Comments: General appearance:  casually groomed, casually dressed    Behavior:  calm, engaged    Demeanor:  pleasant, cooperative    Mood:  anxious, depressed    Affect:  flat   Speech:  regular rate, tone and volume    Thought Process:  linear and goal directed    Thought Content:  appropriate - absent of aggressive or self injurious thoughts, feelings or impulses    Insight into Current Situation:  fair    Judgement: fair   Expected Ability to Adhere to Treatment plan: good        Current Evaluation:  Nutritional Screening:  Considering the patient's height and weight, medications, medical history and preferences, should a referral be made to the dietitian? No  Vitals: most recent vitals signs, dated greater than 90 days prior to this appointment, were reviewed  General: age appropriate, well nourished, casually dressed, neatly groomed  MSK: muscle strength/tone : no tremor or abnormal movements. Gait/Station: no ataxic, steady    Clinical Assessment :     Pt struggles with recurrent depression for which she sees a therapist and is in psychiatric medication management. Pt reports attention/concentration concerns. Pt explained that her symptoms were present before the age of 11 and are cause impairment in more than one setting. Will refer to ADHD testing to confirm a diagnosis.     Diagnosis(es):   1) Major Depressive Disorder, recurrent, moderate    Plan      Goal #1: Improve mood  Goal #2: Improve attention/concentration    Pt is to follow through with ADHD testing.    This author reviewed limits to confidentiality and this author's collaboration with pt's physician. Pt indicated understanding and denied any questions.    Return to Clinic: after ADHD testing    -Call to report any worsening of symptoms or  problems associated with medication  - Pt instructed to go to ER if thoughts of harming self or others arise   Spent 45 minutes face-to-face with patient during evaluation.    -Supportive therapy and psychoeducation provided  -R/B/SE's of medications discussed with the pt who expresses understanding and chooses to take medications as prescribed.   -Pt instructed to call clinic, 911 or go to nearest emergency room if sxs worsen or pt is in   crisis. The pt expresses understanding.

## 2024-11-21 ENCOUNTER — TELEPHONE (OUTPATIENT)
Dept: PSYCHIATRY | Facility: CLINIC | Age: 49
End: 2024-11-21
Payer: COMMERCIAL

## 2024-11-21 ENCOUNTER — PATIENT MESSAGE (OUTPATIENT)
Dept: PSYCHIATRY | Facility: CLINIC | Age: 49
End: 2024-11-21
Payer: COMMERCIAL

## 2024-11-21 NOTE — TELEPHONE ENCOUNTER
Called patient and left message to call office so can get visit rescheduled  Also sent my juan daniel message asking the same

## 2024-12-16 ENCOUNTER — TELEPHONE (OUTPATIENT)
Dept: PSYCHIATRY | Facility: CLINIC | Age: 49
End: 2024-12-16
Payer: COMMERCIAL

## 2024-12-16 NOTE — TELEPHONE ENCOUNTER
Rec'd VM from pt. She is returning a call about rescheduling appt with Dr. Sheridan on 12/26/24. She states she wants appt canceled and she wants to see a provider in [place]. Does not want a call back. Send message via portal per pt request.

## 2024-12-23 ENCOUNTER — PATIENT MESSAGE (OUTPATIENT)
Dept: PSYCHIATRY | Facility: CLINIC | Age: 49
End: 2024-12-23
Payer: COMMERCIAL

## 2024-12-23 DIAGNOSIS — F33.42 RECURRENT MAJOR DEPRESSIVE DISORDER, IN FULL REMISSION: ICD-10-CM

## 2024-12-23 DIAGNOSIS — F41.1 GENERALIZED ANXIETY DISORDER WITH PANIC ATTACKS: Primary | ICD-10-CM

## 2024-12-23 DIAGNOSIS — F41.0 GENERALIZED ANXIETY DISORDER WITH PANIC ATTACKS: Primary | ICD-10-CM

## 2024-12-23 DIAGNOSIS — F34.1 PERSISTENT DEPRESSIVE DISORDER: ICD-10-CM

## 2024-12-23 RX ORDER — FLUOXETINE HYDROCHLORIDE 20 MG/1
CAPSULE ORAL
Qty: 30 CAPSULE | Refills: 1 | Status: SHIPPED | OUTPATIENT
Start: 2024-12-23

## 2024-12-23 NOTE — TELEPHONE ENCOUNTER
Contacted patient by phone to let her know that Prozac 20mg was pended to Dr. Sheridan to send to her pharmacy to be a total of 60mg. Also, offered patient next available appt on Ab[address]ah[address] 3 @ 8am and she accepted appt. I offered virtual since she is having trouble with traveling. She appreciated the offer and scheduled virtually.

## 2025-01-31 ENCOUNTER — HOSPITAL ENCOUNTER (OUTPATIENT)
Dept: RADIOLOGY | Facility: HOSPITAL | Age: 50
Discharge: HOME | End: 2025-01-31
Attending: NURSE PRACTITIONER
Payer: COMMERCIAL

## 2025-01-31 ENCOUNTER — OFFICE VISIT (OUTPATIENT)
Dept: FAMILY MEDICINE | Facility: CLINIC | Age: 50
End: 2025-01-31
Payer: COMMERCIAL

## 2025-01-31 VITALS
SYSTOLIC BLOOD PRESSURE: 108 MMHG | OXYGEN SATURATION: 97 % | WEIGHT: 181 LBS | BODY MASS INDEX: 28.41 KG/M2 | DIASTOLIC BLOOD PRESSURE: 72 MMHG | HEART RATE: 69 BPM | HEIGHT: 67 IN

## 2025-01-31 DIAGNOSIS — N95.1 MENOPAUSAL STATE: ICD-10-CM

## 2025-01-31 DIAGNOSIS — Z13.6 ENCOUNTER FOR SCREENING FOR CARDIOVASCULAR DISORDERS: ICD-10-CM

## 2025-01-31 DIAGNOSIS — M25.512 ACUTE PAIN OF LEFT SHOULDER: ICD-10-CM

## 2025-01-31 DIAGNOSIS — E03.9 HYPOTHYROIDISM, UNSPECIFIED TYPE: ICD-10-CM

## 2025-01-31 DIAGNOSIS — Z00.00 ANNUAL PHYSICAL EXAM: Primary | ICD-10-CM

## 2025-01-31 DIAGNOSIS — E55.9 VITAMIN D INSUFFICIENCY: ICD-10-CM

## 2025-01-31 DIAGNOSIS — Z13.820 SCREENING FOR OSTEOPOROSIS: ICD-10-CM

## 2025-01-31 DIAGNOSIS — E78.5 DYSLIPIDEMIA: ICD-10-CM

## 2025-01-31 DIAGNOSIS — H00.014 HORDEOLUM EXTERNUM OF LEFT UPPER EYELID: ICD-10-CM

## 2025-01-31 PROCEDURE — 99999 PR PBB SHADOW E&M-EST. PATIENT-LVL V: CPT | Mod: PBBFAC,,, | Performed by: NURSE PRACTITIONER

## 2025-01-31 PROCEDURE — 99214 OFFICE O/P EST MOD 30 MIN: CPT | Mod: 25,S$GLB,, | Performed by: NURSE PRACTITIONER

## 2025-01-31 PROCEDURE — 3008F BODY MASS INDEX DOCD: CPT | Mod: CPTII,S$GLB,, | Performed by: NURSE PRACTITIONER

## 2025-01-31 PROCEDURE — 3074F SYST BP LT 130 MM HG: CPT | Mod: CPTII,S$GLB,, | Performed by: NURSE PRACTITIONER

## 2025-01-31 PROCEDURE — 1159F MED LIST DOCD IN RCRD: CPT | Mod: CPTII,S$GLB,, | Performed by: NURSE PRACTITIONER

## 2025-01-31 PROCEDURE — 99396 PREV VISIT EST AGE 40-64: CPT | Mod: S$GLB,,, | Performed by: NURSE PRACTITIONER

## 2025-01-31 PROCEDURE — 73030 X-RAY EXAM OF SHOULDER: CPT | Mod: 26,LT,, | Performed by: RADIOLOGY

## 2025-01-31 PROCEDURE — 3078F DIAST BP <80 MM HG: CPT | Mod: CPTII,S$GLB,, | Performed by: NURSE PRACTITIONER

## 2025-01-31 PROCEDURE — 1160F RVW MEDS BY RX/DR IN RCRD: CPT | Mod: CPTII,S$GLB,, | Performed by: NURSE PRACTITIONER

## 2025-01-31 PROCEDURE — 73030 X-RAY EXAM OF SHOULDER: CPT | Mod: TC,PN,LT

## 2025-01-31 RX ORDER — ERYTHROMYCIN 5 MG/G
OINTMENT OPHTHALMIC 3 TIMES DAILY
Qty: 3.5 G | Refills: 0 | Status: SHIPPED | OUTPATIENT
Start: 2025-01-31

## 2025-01-31 RX ORDER — IBUPROFEN 800 MG/1
800 TABLET ORAL EVERY 8 HOURS PRN
Qty: 30 TABLET | Refills: 0 | Status: SHIPPED | OUTPATIENT
Start: 2025-01-31

## 2025-01-31 NOTE — PROGRESS NOTES
THIS DOCUMENT WAS MADE IN PART WITH VOICE RECOGNITION SOFTWARE.  OCCASIONALLY THIS SOFTWARE WILL MISINTERPRET WORDS OR PHRASES.     Assessment and Plan:    Annual physical exam  Comments:  Anticipatory guidance reviewed  Orders:  -     Comprehensive Metabolic Panel; Future; Expected date: 01/31/2025  -     CBC Without Differential; Future; Expected date: 01/31/2025  -     Lipid Panel; Future; Expected date: 01/31/2025  -     TSH; Future; Expected date: 01/31/2025  -     Hemoglobin A1C; Future; Expected date: 01/31/2025    Hypothyroidism, unspecified type  Comments:  Controlled  Due to recheck TSH  Continue levothyroxine  Orders:  -     TSH; Future; Expected date: 01/31/2025  -     T4, Free; Future; Expected date: 01/31/2025    Dyslipidemia  Comments:  Controlled  Continue Crestor  Repeat lipids  Orders:  -     Comprehensive Metabolic Panel; Future; Expected date: 01/31/2025  -     CBC Without Differential; Future; Expected date: 01/31/2025  -     Lipid Panel; Future; Expected date: 01/31/2025  -     CT Cardiac Scoring; Future; Expected date: 01/31/2025    Hordeolum externum of left upper eyelid  Comments:  Advised on application erythromycin eye ointment  Lid scrubs, warm compresses  Orders:  -     erythromycin (ROMYCIN) ophthalmic ointment; Place into the left eye 3 (three) times daily.  Dispense: 3.5 g; Refill: 0    Acute pain of left shoulder  Comments:  Symptomatic treatment discussed  Stretches, cold compresses, alternate ibuprofen and Tylenol  Consider PT  Orders:  -     X-Ray Shoulder 2 or More Views Left; Future; Expected date: 01/31/2025  -     ibuprofen (ADVIL,MOTRIN) 800 MG tablet; Take 1 tablet (800 mg total) by mouth every 8 (eight) hours as needed for Pain.  Dispense: 30 tablet; Refill: 0    Encounter for screening for cardiovascular disorders  -     CT Cardiac Scoring; Future; Expected date: 01/31/2025    Vitamin D insufficiency  Comments:  Continue vitamin D supplement  Orders:  -     Vitamin D;  Future; Expected date: 01/31/2025    Menopausal state  -     DEXA Bone Density Axial Skeleton 1 or more Site W TBS XPD; Future; Expected date: 01/31/2025    Screening for osteoporosis  -     DEXA Bone Density Axial Skeleton 1 or more Site W TBS XPD; Future; Expected date: 01/31/2025         Visit summary:  Screening recommendations appropriate to age and health status were reviewed.     Continue to work on regular exercise, maintain healthy weight, balanced diet. Avoid unhealthy habits: smoking, excessive alcohol intake.     Follow up if symptoms worsen or fail to improve.   ______________________________________________________________________    Subjective:    Chief Complaint:  Annual exam    HPI:  Ivory is a 49 y.o. year old patient here for annual exam.     Concerned regarding left upper arm/shoulder pain-reports pain exacerbated when she lifts left arm above her head-she had a massage- this helped some.  Does not recall injury.    She reports Stye to upper left eyelid- over a month- seen at urgent care a few weeks and completed oral antibiotic, lost prescription for topical.      HLD-controlled.  Improved with stopping drinking and taking crestor.  She would like to come off the medication.- normal stress echo in 2018, would like to to obtain calcium score.  Concern regarding family history     Hypothyroidism- Taking levothyroxine 25 mcg daily, recent lab normal.     Vitamin D deficiency: takes OTC vitamin-D supplement     HTN- controlled since losing weight, changing diet.  She was on HCTZ 25 mg, stopped in 2023    Menopausal :  She is on HRT-sees Dr. Way  takes vitamin D- concerned for osteoporosis - would like DEXA scan     Seeing psychiatry for depression, anxiety and insomnia She is currently taking fluoxetine and trazodone 50 mg nightly. She had been drinking heavily until February of 2023 but has been sober since then.     Steatohepatitis-  established with hepatology. Noted to have liver lesions,  stable on ultrasound-5/2025      Past Medical History:  Past Medical History:   Diagnosis Date    Abdominal mass, left lower quadrant 11/2016    Anxiety disorder 2010    Asthma     Bilateral ovarian cysts     BMI 30.0-30.9,adult 2/2/2023    Cancer antigen 125 () elevation 11/2016    Fatty liver 7/24/2023    Focal nodular hyperplasia of liver 7/24/2023    High cholesterol     History of hemorrhoids     Incisional abscess     right breast I/D abscess 2012    Mild episode of recurrent major depressive disorder 11/16/2017    MTHFR mutation     Recurrent major depressive disorder, in partial remission 5/31/2023    Thrombocytopenia complicating pregnancy 2010 and 2012    Vitamin D insufficiency 12/29/2022       Past Surgical History:  Past Surgical History:   Procedure Laterality Date    BREAST BIOPSY Right 2011    BREAST CYST INCISION AND DRAINAGE      right breast abscess I/D 2012    COLONOSCOPY  2014    COLONOSCOPY N/A 10/28/2021    Procedure: COLONOSCOPY;  Surgeon: Jeremy Wheatley Jr., MD;  Location: [facility];  Service: Endoscopy;  Laterality: N/A;    essure  2012    HYSTERECTOMY  2016    Partial - left ovary removed only    OOPHORECTOMY Left 2016       Family History:  Family History   Problem Relation Name Age of Onset    Celiac disease Maternal Aunt      Hepatitis Maternal Aunt          autoimmune    Colon cancer Maternal Grandmother      Cancer Maternal Grandmother      Hepatitis Mother          autoimmune    Hypertension Mother      Pulmonary embolism Mother      Hypertension Father      Hyperlipidemia Father      Heart disease Father      Early death Father  53        MI    No Known Problems Brother      Breast cancer Neg Hx      Diabetes Neg Hx      Miscarriages / Stillbirths Neg Hx      Ovarian cancer Neg Hx      Stroke Neg Hx         Social History:  Social History     Socioeconomic History    Marital status: Single    Number of children: 2   Tobacco Use    Smoking status: Every Day     Types: Vaping  with nicotine     Passive exposure: Never    Smokeless tobacco: Never    Tobacco comments:     on wellbutrin, smokes 3 cigs per week   Substance and Sexual Activity    Alcohol use: Yes     Alcohol/week: 10.0 standard drinks of alcohol     Types: 10 Glasses of wine per week    Drug use: No    Sexual activity: Yes     Partners: Male   Social History Narrative         Social Drivers of Health     Financial Resource Strain: Low Risk  (11/10/2024)    Overall Financial Resource Strain (CARDIA)     Difficulty of Paying Living Expenses: Not very hard   Food Insecurity: No Food Insecurity (11/10/2024)    Hunger Vital Sign     Worried About Running Out of Food in the Last Year: Never true     Ran Out of Food in the Last Year: Never true   Transportation Needs: No Transportation Needs (9/24/2024)    TRANSPORTATION NEEDS     Transportation : No   Physical Activity: Inactive (11/10/2024)    Exercise Vital Sign     Days of Exercise per Week: 0 days     Minutes of Exercise per Session: 0 min   Stress: Stress Concern Present (11/10/2024)    [employer] - Occupational Stress Questionnaire     Feeling of Stress : Rather much   Housing Stability: Low Risk  (11/10/2024)    Housing Stability Vital Sign     Unable to Pay for Housing in the Last Year: No     Homeless in the Last Year: No       Medications:  Current Outpatient Medications on File Prior to Visit   Medication Sig Dispense Refill    albuterol (PROVENTIL/VENTOLIN HFA) 90 mcg/actuation inhaler Inhale 2 puffs into the lungs every 6 (six) hours as needed for Wheezing. Rescue 18 g 11    buPROPion (WELLBUTRIN XL) 300 MG 24 hr tablet Take 1 tablet (300 mg total) by mouth every morning. (Patient taking differently: Take 150 mg by mouth every morning.) 90 tablet 1    estradioL (VIVELLE-DOT) 0.1 mg/24 hr PTSW Place 1 patch onto the skin twice a week.      FLUoxetine 40 MG capsule Take 1 capsule (40 mg total) by mouth once daily. 90 capsule 1     levothyroxine (SYNTHROID) 50 MCG tablet TAKE 1 TABLET BY MOUTH BEFORE BREAKFAST. 90 tablet 3    progesterone (PROMETRIUM) 200 MG capsule Take 200 mg by mouth every evening.      rosuvastatin (CRESTOR) 20 MG tablet TAKE 1 TABLET BY MOUTH EVERY DAY 90 tablet 3    traZODone (DESYREL) 50 MG tablet Take 1 tablet (50 mg total) by mouth every evening. 90 tablet 1    UNABLE TO FIND nightly. Testosterone Cream 1 %      valACYclovir (VALTREX) 1000 MG tablet Take 2 tablets (2,000 mg total) by mouth 2 (two) times daily. for 1 day 8 tablet 5    [DISCONTINUED] FLUoxetine 20 MG capsule Take 40mg with 20mg (60mg total) by mouth daily 30 capsule 1    [DISCONTINUED] hydrocortisone 2.5 % cream Apply topically 2 (two) times daily. 28 g 1     No current facility-administered medications on file prior to visit.       Allergies:  Penicillins    Immunizations:  Immunization History   Administered Date(s) Administered    COVID-19, MRNA, LN-S, PF (MODERNA FULL 0.5 ML DOSE) 12/28/2020, 01/25/2021    COVID-19, MRNA, LN-S, PF (Pfizer) (Purple Cap) 12/01/2021    Influenza 10/28/2018, 10/06/2019, 12/27/2021, 11/13/2024    Influenza - Quadrivalent 12/17/2021    Influenza - Quadrivalent - PF *Preferred* (6 months and older) 10/06/2020, 12/17/2021, 08/31/2022, 10/12/2023    Influenza - Trivalent - Afluria, Fluzone MDV 11/24/2014    Pneumococcal Conjugate - 20 Valent 08/24/2022    Pneumococcal Polysaccharide - 23 Valent 01/26/2017    Tdap 03/29/2012, 08/24/2022       Review of Systems:  Review of Systems   Constitutional:  Negative for fatigue, fever and unexpected weight change.   HENT:  Negative for congestion, postnasal drip and rhinorrhea.    Eyes:  Positive for pain (left upper eyelid-stye). Negative for photophobia, redness and visual disturbance.   Respiratory:  Negative for cough and shortness of breath.    Gastrointestinal:  Negative for constipation, diarrhea, nausea and vomiting.   Genitourinary:  Negative for difficulty urinating,  "dysuria, flank pain and urgency.   Musculoskeletal:  Positive for arthralgias. Negative for back pain and joint swelling.   Neurological:  Negative for dizziness and headaches.       Objective:    Vitals:  Vitals:    01/31/25 1144   BP: 108/72   Pulse: 69   SpO2: 97%   Weight: 82.1 kg (181 lb)   Height: 5' 7" (1.702 m)   PainSc:   2   PainLoc: Arm       Physical Exam  Vitals and nursing note reviewed.   Constitutional:       General: She is not in acute distress.  HENT:      Head: Normocephalic and atraumatic.      Mouth/Throat:      Mouth: Mucous membranes are moist.      Pharynx: Oropharynx is clear.   Eyes:      General: No scleral icterus.        Left eye: Hordeolum (Center upper eyelid) present.No discharge.      Conjunctiva/sclera: Conjunctivae normal.   Cardiovascular:      Rate and Rhythm: Normal rate and regular rhythm.   Pulmonary:      Effort: Pulmonary effort is normal. No respiratory distress.      Breath sounds: Normal breath sounds.   Abdominal:      General: Bowel sounds are normal.      Palpations: Abdomen is soft.   Musculoskeletal:      Left shoulder: Tenderness (Exacerbated upon raising left arm) present. No bony tenderness or crepitus. Normal range of motion.      Left upper arm: Tenderness present. No bony tenderness.        Arms:       Right lower leg: No edema.      Left lower leg: No edema.   Skin:     General: Skin is warm and dry.   Neurological:      Mental Status: She is alert and oriented to person, place, and time.   Psychiatric:         Mood and Affect: Mood normal.         Behavior: Behavior normal.         Thought Content: Thought content normal.         Data:  Lab Results   Component Value Date    HGBA1C 5.1 05/21/2024    HGBA1C 5.4 03/02/2023    HGBA1C 5.4 08/24/2022           Medical history reviewed, Medications reconciled.          JONH Griffin  Family Medicine        "

## 2025-02-05 ENCOUNTER — LAB VISIT (OUTPATIENT)
Dept: LAB | Facility: HOSPITAL | Age: 50
End: 2025-02-05
Attending: INTERNAL MEDICINE
Payer: COMMERCIAL

## 2025-02-05 ENCOUNTER — PATIENT MESSAGE (OUTPATIENT)
Dept: PSYCHIATRY | Facility: CLINIC | Age: 50
End: 2025-02-05
Payer: COMMERCIAL

## 2025-02-05 ENCOUNTER — OFFICE VISIT (OUTPATIENT)
Dept: PSYCHIATRY | Facility: CLINIC | Age: 50
End: 2025-02-05
Payer: COMMERCIAL

## 2025-02-05 VITALS
HEIGHT: 67 IN | WEIGHT: 176.38 LBS | DIASTOLIC BLOOD PRESSURE: 71 MMHG | HEART RATE: 82 BPM | SYSTOLIC BLOOD PRESSURE: 105 MMHG | BODY MASS INDEX: 27.68 KG/M2

## 2025-02-05 DIAGNOSIS — F33.1 MODERATE EPISODE OF RECURRENT MAJOR DEPRESSIVE DISORDER: Primary | ICD-10-CM

## 2025-02-05 DIAGNOSIS — E78.5 DYSLIPIDEMIA: ICD-10-CM

## 2025-02-05 DIAGNOSIS — Z00.00 ANNUAL PHYSICAL EXAM: ICD-10-CM

## 2025-02-05 DIAGNOSIS — E03.9 HYPOTHYROIDISM, UNSPECIFIED TYPE: ICD-10-CM

## 2025-02-05 DIAGNOSIS — E55.9 VITAMIN D INSUFFICIENCY: ICD-10-CM

## 2025-02-05 LAB
25(OH)D3+25(OH)D2 SERPL-MCNC: 59 NG/ML (ref 30–96)
ALBUMIN SERPL BCP-MCNC: 4.2 G/DL (ref 3.5–5.2)
ALP SERPL-CCNC: 48 U/L (ref 40–150)
ALT SERPL W/O P-5'-P-CCNC: 13 U/L (ref 10–44)
ANION GAP SERPL CALC-SCNC: 7 MMOL/L (ref 8–16)
AST SERPL-CCNC: 16 U/L (ref 10–40)
BILIRUB SERPL-MCNC: 0.7 MG/DL (ref 0.1–1)
BUN SERPL-MCNC: 8 MG/DL (ref 6–20)
CALCIUM SERPL-MCNC: 9.6 MG/DL (ref 8.7–10.5)
CHLORIDE SERPL-SCNC: 105 MMOL/L (ref 95–110)
CHOLEST SERPL-MCNC: 126 MG/DL (ref 120–199)
CHOLEST/HDLC SERPL: 2.7 {RATIO} (ref 2–5)
CO2 SERPL-SCNC: 26 MMOL/L (ref 23–29)
CREAT SERPL-MCNC: 0.8 MG/DL (ref 0.5–1.4)
ERYTHROCYTE [DISTWIDTH] IN BLOOD BY AUTOMATED COUNT: 12.4 % (ref 11.5–14.5)
EST. GFR  (NO RACE VARIABLE): >60 ML/MIN/1.73 M^2
ESTIMATED AVG GLUCOSE: 94 MG/DL (ref 68–131)
GLUCOSE SERPL-MCNC: 83 MG/DL (ref 70–110)
HBA1C MFR BLD: 4.9 % (ref 4–5.6)
HCT VFR BLD AUTO: 38.3 % (ref 37–48.5)
HDLC SERPL-MCNC: 47 MG/DL (ref 40–75)
HDLC SERPL: 37.3 % (ref 20–50)
HGB BLD-MCNC: 12.6 G/DL (ref 12–16)
LDLC SERPL CALC-MCNC: 68.4 MG/DL (ref 63–159)
MCH RBC QN AUTO: 31.4 PG (ref 27–31)
MCHC RBC AUTO-ENTMCNC: 32.9 G/DL (ref 32–36)
MCV RBC AUTO: 96 FL (ref 82–98)
NONHDLC SERPL-MCNC: 79 MG/DL
PLATELET # BLD AUTO: 180 K/UL (ref 150–450)
PMV BLD AUTO: 11.3 FL (ref 9.2–12.9)
POTASSIUM SERPL-SCNC: 4.4 MMOL/L (ref 3.5–5.1)
PROT SERPL-MCNC: 7.1 G/DL (ref 6–8.4)
RBC # BLD AUTO: 4.01 M/UL (ref 4–5.4)
SODIUM SERPL-SCNC: 138 MMOL/L (ref 136–145)
T4 FREE SERPL-MCNC: 1.01 NG/DL (ref 0.71–1.51)
TRIGL SERPL-MCNC: 53 MG/DL (ref 30–150)
TSH SERPL DL<=0.005 MIU/L-ACNC: 0.62 UIU/ML (ref 0.4–4)
WBC # BLD AUTO: 5.24 K/UL (ref 3.9–12.7)

## 2025-02-05 PROCEDURE — 80053 COMPREHEN METABOLIC PANEL: CPT | Performed by: NURSE PRACTITIONER

## 2025-02-05 PROCEDURE — 82306 VITAMIN D 25 HYDROXY: CPT | Performed by: NURSE PRACTITIONER

## 2025-02-05 PROCEDURE — 3008F BODY MASS INDEX DOCD: CPT | Mod: CPTII,S$GLB,, | Performed by: PSYCHOLOGIST

## 2025-02-05 PROCEDURE — 85027 COMPLETE CBC AUTOMATED: CPT | Performed by: NURSE PRACTITIONER

## 2025-02-05 PROCEDURE — 80061 LIPID PANEL: CPT | Performed by: NURSE PRACTITIONER

## 2025-02-05 PROCEDURE — 84443 ASSAY THYROID STIM HORMONE: CPT | Performed by: NURSE PRACTITIONER

## 2025-02-05 PROCEDURE — 90833 PSYTX W PT W E/M 30 MIN: CPT | Mod: S$GLB,,, | Performed by: PSYCHOLOGIST

## 2025-02-05 PROCEDURE — 99214 OFFICE O/P EST MOD 30 MIN: CPT | Mod: S$GLB,,, | Performed by: PSYCHOLOGIST

## 2025-02-05 PROCEDURE — 3074F SYST BP LT 130 MM HG: CPT | Mod: CPTII,S$GLB,, | Performed by: PSYCHOLOGIST

## 2025-02-05 PROCEDURE — 83036 HEMOGLOBIN GLYCOSYLATED A1C: CPT | Performed by: NURSE PRACTITIONER

## 2025-02-05 PROCEDURE — 99999 PR PBB SHADOW E&M-EST. PATIENT-LVL III: CPT | Mod: PBBFAC,,, | Performed by: PSYCHOLOGIST

## 2025-02-05 PROCEDURE — 3078F DIAST BP <80 MM HG: CPT | Mod: CPTII,S$GLB,, | Performed by: PSYCHOLOGIST

## 2025-02-05 PROCEDURE — 1159F MED LIST DOCD IN RCRD: CPT | Mod: CPTII,S$GLB,, | Performed by: PSYCHOLOGIST

## 2025-02-05 PROCEDURE — 84439 ASSAY OF FREE THYROXINE: CPT | Performed by: NURSE PRACTITIONER

## 2025-02-05 RX ORDER — FLUOXETINE HYDROCHLORIDE 20 MG/1
20 CAPSULE ORAL DAILY
Qty: 7 CAPSULE | Refills: 0 | Status: SHIPPED | OUTPATIENT
Start: 2025-02-05 | End: 2025-02-12

## 2025-02-05 RX ORDER — SERTRALINE HYDROCHLORIDE 50 MG/1
TABLET, FILM COATED ORAL
Qty: 60 TABLET | Refills: 1 | Status: SHIPPED | OUTPATIENT
Start: 2025-02-05 | End: 2025-02-27

## 2025-02-05 NOTE — PROGRESS NOTES
Outpatient Psychiatry Follow-Up Visit    Clinical Status of Patient: Outpatient (Ambulatory)  02/05/2025     Chief Complaint: 49 year old female presenting today for a follow-up.       Interval History and Content of Current Session:  Interim Events/Subjective Report/Content of Current Session:  follow-up appointment.    Pt is a 49 year old female with past psychiatric hx of depression, anxiety who presents for follow-up treatment. Pt comes to transfer care closer to home. Pt is feeling depressed and unproductive. Pt is accepting the fact that she struggles with depression and may continue to struggle. We agreed to work to ameliorate her symptoms. Pt did well on Zoloft in the past. Pt denied any other major changes or new stressors.    Past Psychiatric hx: ATARAX/VISTARIL, CELEXA, CLONIDINE, DIPHENHYDRAMINE, EFFEXOR, ELAVIL, GABAPENTIN, REMERON, RESTORIL, TRAZODONE, WELLBUTRIN, and ZOLOFT  Current Psychotropic Medication(s): CLONIDINE prn, ATARAX/VISTARIL, EFFEXOR, and WELLBUTRIN    Past Medical hx:   Past Medical History:   Diagnosis Date    Abdominal mass, left lower quadrant 11/2016    Anxiety disorder 2010    Asthma     Bilateral ovarian cysts     BMI 30.0-30.9,adult 2/2/2023    Cancer antigen 125 () elevation 11/2016    Fatty liver 7/24/2023    Focal nodular hyperplasia of liver 7/24/2023    High cholesterol     History of hemorrhoids     Incisional abscess     right breast I/D abscess 2012    Mild episode of recurrent major depressive disorder 11/16/2017    MTHFR mutation     Recurrent major depressive disorder, in partial remission 5/31/2023    Thrombocytopenia complicating pregnancy 2010 and 2012    Vitamin D insufficiency 12/29/2022        Interim hx:  Medication changes last visit:   n/a  Anxiety: mild - unchanged  Depression: moderate - variable     Denies suicidal/homicidal ideations.  Denies hopelessness/worthlessness.    Denies auditory/visual hallucinations      Alcohol: 2 years sober per her  report  Drug: pt denied  Caffeine: moderate use  Tobacco: pt denied      Review of Systems   PSYCHIATRIC: Pertinent items are noted in the narrative.        CONSTITUTIONAL: weight stable    Past Medical, Family and Social History: The patient's past medical, family and social history have been reviewed and updated as appropriate within the electronic medical record. See encounter notes.     Current Psychiatric Medication:  Prozac 40mg Q D, Wellbutrin XL 150mg Q AM, Trazodone 50mg Q HS     Compliance: yes      Side effects: pt denied     Risk Parameters:  Patient reports no suicidal ideation  Patient reports no homicidal ideation  Patient reports no self-injurious behavior  Patient reports no violent behavior     Exam (detailed: at least 9 elements; comprehensive: all 15 elements)   Constitutional  Vitals:  Most recent vital signs, dated less than 90 days prior to this appointment, were reviewed. Pulse:  [82]   BP: (105)/(71)       General:  unremarkable, age appropriate, casual attire, good eye contact, good rapport       Musculoskeletal  Muscle Strength/Tone:  no flaccidity, no tremor    Gait & Station:  normal      Psychiatric                       Speech:  normal tone, normal rate, rhythm, and volume   Mood & Affect:   Depressed, anxious         Thought Process:   Goal directed; Linear    Associations:   intact   Thought Content:   No SI/HI, delusions, or paranoia, no AV/VH   Insight & Judgement:   Good, adequate to circumstances   Orientation:   grossly intact; alert and oriented x 4    Memory:  intact for content of interview    Language:  grossly intact, can repeat    Attention Span  : Grossly intact for content of interview   Fund of Knowledge:   intact and appropriate to age and level of education        Assessment and Diagnosis   Status/Progress: increasing distress     Impression: Pt struggles with recurrent depression that is not currently well managed. Pt also noted a hx of anxiety and wonders if she  struggles with ADHD. Pt is to continue in psychiatric medication management and follow through on ADHD testing.    Diagnosis: MDD, recurrent, moderate    Intervention/Counseling/Treatment Plan   Medication Management:      1. Continue Wellbutrin XL 150mg Q AM and Trazodone 50mg Q HS     2. Take Prozac 20mg once daily and Zoloft 25mg (half of your 50mg tablet) at bedtime for four days. Then take Prozac 20mg and Zoloft 50mg at bedtime for three days. Then stop Prozac and take Zoloft 100mg (two 50mg tablets) at bedtime.     3. Continue in therapy     4. Call to report any worsening of symptoms or problems with the medication. Pt instructed to go to ER with thoughts of harming self, others    Psychotherapy:   Target symptoms: depression, anxiety, insomnia  Why chosen therapy is appropriate versus another modality: CBT used; relevant to diagnosis, patient responds to this modality  Outcome monitoring methods: self-report, observation  Therapeutic intervention type: Cognitive Behavioral Therapy  Topics discussed/themes: building skills sets for symptom management, symptom recognition, nutrition, exercise  The patient's response to the intervention is fair  Patient's response to treatment is: good.   The patient's progress toward treatment goals: increasing distress  16 minutes spent with pt in psychotherapy and 5 minutes in medication management     Return to clinic: 5 weeks or earlier PRN    -Cognitive-Behavioral/Supportive therapy and psychoeducation provided  -R/B/SE's of medications discussed with the pt who expresses understanding and chooses to take medications as prescribed.   -Pt instructed to call clinic, 911 or go to nearest emergency room if sxs worsen or pt is in   crisis. The pt expresses understanding.    Leeroy Domínguez, PhD, MP     Antidepressant/Antianxiety Medication Initiation:  Patient informed of risks, benefits, and potential side effects of medication and accepts informed consent.  Common side  effects include nausea, fatigue, headache, insomnia., Specifically discussed the possibility of new or worsening suicidal thoughts/depression.  Patient instructed to stop the medication immediately and seek urgent treatment if this occurs. Patient instructed not to abruptly discontinue medication without physician guidance except in cases of sudden onset or worsening of SI.

## 2025-02-11 ENCOUNTER — HOSPITAL ENCOUNTER (OUTPATIENT)
Dept: RADIOLOGY | Facility: HOSPITAL | Age: 50
Discharge: HOME | End: 2025-02-11
Attending: NURSE PRACTITIONER
Payer: COMMERCIAL

## 2025-02-11 DIAGNOSIS — N95.1 MENOPAUSAL STATE: ICD-10-CM

## 2025-02-11 DIAGNOSIS — Z13.820 SCREENING FOR OSTEOPOROSIS: ICD-10-CM

## 2025-02-11 PROCEDURE — 77092 TBS I&R FX RSK QHP: CPT | Mod: ,,, | Performed by: RADIOLOGY

## 2025-02-11 PROCEDURE — 77080 DXA BONE DENSITY AXIAL: CPT | Mod: TC,PO

## 2025-02-11 PROCEDURE — 77080 DXA BONE DENSITY AXIAL: CPT | Mod: 26,,, | Performed by: RADIOLOGY

## 2025-02-19 ENCOUNTER — HOSPITAL ENCOUNTER (OUTPATIENT)
Dept: RADIOLOGY | Facility: HOSPITAL | Age: 50
Discharge: HOME | End: 2025-02-19
Attending: NURSE PRACTITIONER
Payer: COMMERCIAL

## 2025-02-19 DIAGNOSIS — Z13.6 ENCOUNTER FOR SCREENING FOR CARDIOVASCULAR DISORDERS: ICD-10-CM

## 2025-02-19 DIAGNOSIS — E78.5 DYSLIPIDEMIA: ICD-10-CM

## 2025-02-19 PROCEDURE — 75571 CT HRT W/O DYE W/CA TEST: CPT | Mod: TC

## 2025-02-20 ENCOUNTER — RESULTS FOLLOW-UP (OUTPATIENT)
Dept: FAMILY MEDICINE | Facility: CLINIC | Age: 50
End: 2025-02-20

## 2025-02-25 NOTE — TELEPHONE ENCOUNTER
----- Message from Cindi Miller MA sent at 2/21/2025  3:38 PM CST -----      ----- Message -----  From: Interface, Rad Results In  Sent: 2/19/2025  12:41 PM CST  To: CAROLINA Griffin

## 2025-02-27 RX ORDER — SERTRALINE HYDROCHLORIDE 50 MG/1
TABLET, FILM COATED ORAL
Qty: 180 TABLET | Refills: 1 | Status: SHIPPED | OUTPATIENT
Start: 2025-02-27

## 2025-03-06 ENCOUNTER — PATIENT MESSAGE (OUTPATIENT)
Dept: FAMILY MEDICINE | Facility: CLINIC | Age: 50
End: 2025-03-06
Payer: COMMERCIAL

## 2025-03-06 DIAGNOSIS — E66.3 OVERWEIGHT: Primary | ICD-10-CM

## 2025-03-08 DIAGNOSIS — M25.512 ACUTE PAIN OF LEFT SHOULDER: ICD-10-CM

## 2025-03-10 RX ORDER — IBUPROFEN 800 MG/1
800 TABLET ORAL EVERY 8 HOURS PRN
Qty: 30 TABLET | Refills: 0 | Status: SHIPPED | OUTPATIENT
Start: 2025-03-10

## 2025-03-10 NOTE — TELEPHONE ENCOUNTER
Refill Routing Note   Medication(s) are not appropriate for processing by [facility] for the following reason(s):        Outside of protocol  Non-participating provider    [facility] action(s):  Route               Appointments  past 12m or future 3m with PCP    Date Provider   Last Visit   1/31/2025 Archana Dugan FNP   Next Visit   Visit date not found Archana Dugan FNP   ED visits in past 90 days: 0        Note composed:8:14 AM 03/10/2025

## 2025-03-13 ENCOUNTER — OFFICE VISIT (OUTPATIENT)
Dept: FAMILY MEDICINE | Facility: CLINIC | Age: 50
End: 2025-03-13
Payer: COMMERCIAL

## 2025-03-13 VITALS — HEIGHT: 67 IN | WEIGHT: 185 LBS | BODY MASS INDEX: 29.03 KG/M2

## 2025-03-13 DIAGNOSIS — E66.3 OVERWEIGHT (BMI 25.0-29.9): ICD-10-CM

## 2025-03-13 DIAGNOSIS — E78.2 MIXED HYPERLIPIDEMIA: Primary | ICD-10-CM

## 2025-03-13 RX ORDER — TIRZEPATIDE 2.5 MG/.5ML
2.5 INJECTION, SOLUTION SUBCUTANEOUS
Qty: 2 ML | Refills: 0 | Status: SHIPPED | OUTPATIENT
Start: 2025-03-13

## 2025-03-13 NOTE — PROGRESS NOTES
Assessment and Plan:    1. Mixed hyperlipidemia (Primary)  Recheck lipid panel four months after being off of Crestor.  If it has increased significantly again she is willing to go back on Crestor.  Calcium score was 0.  - Lipid Panel; Future    2. Overweight (BMI 25.0-29.9)  - tirzepatide, weight loss, (ZEPBOUND) 2.5 mg/0.5 mL Soln; Inject 0.5 mLs (2.5 mg total) into the skin every 7 days.  Dispense: 2 mL; Refill: 0  Discussed options and have elected to start Zepbound. Discussed that this medication is intended to supress appetite, and will not work without the patient also focusing on diet and exercise. We discussed that long term success would be best achieved by getting on a structured diet plan and using this medication to assist with feelings of hunger and appetite while sticking to a healthy diet plan and increasing exercise. Discussed that we will recheck weight, HR, and BP in 1 month. Discussed possible side effects and how to use this correctly.      ______________________________________________________________________  Subjective:    Chief Complaint:  Discussed medications    HPI:  Ivory is a 49 y.o. year old female patient with telemedicine visit today as consultation to discuss medications.    The patient location is: home in [place]  The chief complaint leading to consultation is: as above    Visit type: audiovisual    Face to Face time with patient: 10 minutes  20 minutes of total time spent on the encounter, which includes face to face time and non-face to face time preparing to see the patient (eg, review of tests), Obtaining and/or reviewing separately obtained history, Documenting clinical information in the electronic or other health record, Independently interpreting results (not separately reported) and communicating results to the patient/family/caregiver, or Care coordination (not separately reported).         Each patient to whom he or she provides medical services by telemedicine is:  (1)  informed of the relationship between the physician and patient and the respective role of any other health care provider with respect to management of the patient; and (2) notified that he or she may decline to receive medical services by telemedicine and may withdraw from such care at any time.    Notes:       She had gotten down to 155 lbs when she had quit drinking and was on naltrexone at the time. In the last few months she has been gaining weight back substantially. She has been on compounded semaglutide in the past, but was not on this long enough to have significant effect. She denies any side effects with this. She is interested in discussing zepbound. She has no history of pancreatitis and has no family history of MEN2 that she is aware of.     She reports that about 1 month ago (after the most recent set of labs) she had stopped taking crestor. She is doing a lot better with her diet and is no longer drinking.  She wants to see if she can go without this medication.  When her LDL had been as high as 248, she was drinking regularly.    Medications:  Medications Ordered Prior to Encounter[1]    Review of Systems:  Review of Systems   Constitutional:  Positive for unexpected weight change. Negative for activity change.   HENT:  Negative for hearing loss, rhinorrhea and trouble swallowing.    Eyes:  Negative for discharge and visual disturbance.   Respiratory:  Negative for chest tightness and wheezing.    Cardiovascular:  Negative for chest pain and palpitations.   Gastrointestinal:  Negative for blood in stool, constipation, diarrhea and vomiting.   Endocrine: Negative for polydipsia and polyuria.   Genitourinary:  Negative for difficulty urinating, dysuria, hematuria and menstrual problem.   Musculoskeletal:  Negative for arthralgias, joint swelling and neck pain.   Neurological:  Negative for weakness and headaches.   Psychiatric/Behavioral:  Negative for confusion and dysphoric mood.      Entered by  "patient and reviewed and updated during visit      Past Medical History:  Past Medical History:   Diagnosis Date    Abdominal mass, left lower quadrant 11/2016    Anxiety disorder 2010    Asthma     Bilateral ovarian cysts     BMI 30.0-30.9,adult 2/2/2023    Cancer antigen 125 () elevation 11/2016    Fatty liver 7/24/2023    Focal nodular hyperplasia of liver 7/24/2023    High cholesterol     History of hemorrhoids     Incisional abscess     right breast I/D abscess 2012    Mild episode of recurrent major depressive disorder 11/16/2017    MTHFR mutation     Recurrent major depressive disorder, in partial remission 5/31/2023    Thrombocytopenia complicating pregnancy 2010 and 2012    Vitamin D insufficiency 12/29/2022       Objective:    Vitals:  Vitals:    03/13/25 1533   Weight: 83.9 kg (185 lb)   Height: 5' 7" (1.702 m)     Body mass index is 28.98 kg/m².      Physical Exam  Constitutional:       General: She is not in acute distress.     Appearance: She is well-developed.   HENT:      Head: Normocephalic and atraumatic.   Pulmonary:      Effort: Pulmonary effort is normal. No respiratory distress.   Neurological:      Mental Status: She is alert and oriented to person, place, and time.   Psychiatric:         Behavior: Behavior normal.         Thought Content: Thought content normal.         Judgment: Judgment normal.         Data:  A1c 4.9  LDL 68, down from 248 before crestor    Didi Conteh MD  Internal Medicine         [1]   Current Outpatient Medications on File Prior to Visit   Medication Sig Dispense Refill    albuterol (PROVENTIL/VENTOLIN HFA) 90 mcg/actuation inhaler Inhale 2 puffs into the lungs every 6 (six) hours as needed for Wheezing. Rescue 18 g 11    buPROPion (WELLBUTRIN XL) 300 MG 24 hr tablet Take 1 tablet (300 mg total) by mouth every morning. (Patient taking differently: Take 150 mg by mouth every morning.) 90 tablet 1    erythromycin (ROMYCIN) ophthalmic ointment Place into the left eye " 3 (three) times daily. 3.5 g 0    estradioL (VIVELLE-DOT) 0.1 mg/24 hr PTSW Place 1 patch onto the skin twice a week.      FLUoxetine 20 MG capsule Take 1 capsule (20 mg total) by mouth once daily. for 7 days 7 capsule 0    ibuprofen (ADVIL,MOTRIN) 800 MG tablet TAKE 1 TABLET BY MOUTH EVERY 8 HOURS AS NEEDED FOR PAIN 30 tablet 0    levothyroxine (SYNTHROID) 50 MCG tablet TAKE 1 TABLET BY MOUTH EVERY DAY BEFORE BREAKFAST 90 tablet 3    progesterone (PROMETRIUM) 200 MG capsule Take 200 mg by mouth every evening.      rosuvastatin (CRESTOR) 20 MG tablet TAKE 1 TABLET BY MOUTH EVERY DAY 90 tablet 3    sertraline (ZOLOFT) 50 MG tablet TAKE 0.5 TABS (25MG) AT BEDTIME FOR FOUR DAYS, THEN TAKE 1 TAB (50MG) AT BEDTIME FOR THREE DAYS, THEN TAKE 2 TABS (100MG) AT BEDTIME 180 tablet 1    traZODone (DESYREL) 50 MG tablet Take 1 tablet (50 mg total) by mouth every evening. 90 tablet 1    UNABLE TO FIND nightly. Testosterone Cream 1 %      valACYclovir (VALTREX) 1000 MG tablet Take 2 tablets (2,000 mg total) by mouth 2 (two) times daily. for 1 day 8 tablet 5     No current facility-administered medications on file prior to visit.

## 2025-03-17 ENCOUNTER — PATIENT MESSAGE (OUTPATIENT)
Dept: PSYCHIATRY | Facility: CLINIC | Age: 50
End: 2025-03-17
Payer: COMMERCIAL

## 2025-03-19 ENCOUNTER — PATIENT MESSAGE (OUTPATIENT)
Dept: PSYCHIATRY | Facility: CLINIC | Age: 50
End: 2025-03-19
Payer: COMMERCIAL

## 2025-03-19 ENCOUNTER — OFFICE VISIT (OUTPATIENT)
Dept: PSYCHIATRY | Facility: CLINIC | Age: 50
End: 2025-03-19
Payer: COMMERCIAL

## 2025-03-19 VITALS
BODY MASS INDEX: 29.27 KG/M2 | DIASTOLIC BLOOD PRESSURE: 71 MMHG | WEIGHT: 186.5 LBS | HEIGHT: 67 IN | HEART RATE: 71 BPM | SYSTOLIC BLOOD PRESSURE: 103 MMHG

## 2025-03-19 DIAGNOSIS — F33.1 MODERATE EPISODE OF RECURRENT MAJOR DEPRESSIVE DISORDER: Primary | ICD-10-CM

## 2025-03-19 DIAGNOSIS — F41.0 GENERALIZED ANXIETY DISORDER WITH PANIC ATTACKS: ICD-10-CM

## 2025-03-19 DIAGNOSIS — F41.1 GENERALIZED ANXIETY DISORDER WITH PANIC ATTACKS: ICD-10-CM

## 2025-03-19 PROCEDURE — 90833 PSYTX W PT W E/M 30 MIN: CPT | Mod: S$GLB,,, | Performed by: PSYCHOLOGIST

## 2025-03-19 PROCEDURE — 99999 PR PBB SHADOW E&M-EST. PATIENT-LVL III: CPT | Mod: PBBFAC,,, | Performed by: PSYCHOLOGIST

## 2025-03-19 PROCEDURE — 3074F SYST BP LT 130 MM HG: CPT | Mod: CPTII,S$GLB,, | Performed by: PSYCHOLOGIST

## 2025-03-19 PROCEDURE — 3078F DIAST BP <80 MM HG: CPT | Mod: CPTII,S$GLB,, | Performed by: PSYCHOLOGIST

## 2025-03-19 PROCEDURE — 3044F HG A1C LEVEL LT 7.0%: CPT | Mod: CPTII,S$GLB,, | Performed by: PSYCHOLOGIST

## 2025-03-19 PROCEDURE — 1159F MED LIST DOCD IN RCRD: CPT | Mod: CPTII,S$GLB,, | Performed by: PSYCHOLOGIST

## 2025-03-19 PROCEDURE — 3008F BODY MASS INDEX DOCD: CPT | Mod: CPTII,S$GLB,, | Performed by: PSYCHOLOGIST

## 2025-03-19 PROCEDURE — 99214 OFFICE O/P EST MOD 30 MIN: CPT | Mod: S$GLB,,, | Performed by: PSYCHOLOGIST

## 2025-03-19 PROCEDURE — G2211 COMPLEX E/M VISIT ADD ON: HCPCS | Mod: S$GLB,,, | Performed by: PSYCHOLOGIST

## 2025-03-19 RX ORDER — SERTRALINE HYDROCHLORIDE 100 MG/1
150 TABLET, FILM COATED ORAL NIGHTLY
Qty: 45 TABLET | Refills: 1 | Status: SHIPPED | OUTPATIENT
Start: 2025-03-19 | End: 2026-03-19

## 2025-03-19 NOTE — PROGRESS NOTES
Outpatient Psychiatry Follow-Up Visit    Clinical Status of Patient: Outpatient (Ambulatory)  03/19/2025     Chief Complaint: 49 year old female presenting today for a follow-up.       Interval History and Content of Current Session:  Interim Events/Subjective Report/Content of Current Session:  follow-up appointment.    Pt is a 49 year old female with past psychiatric hx of depression, anxiety who presents for follow-up treatment. Pt feels no better. Pt's main complaint is daytime fatigue even though she sleeps well at night. Pt is losing hope. Pt stopped Wellbutrin as she felt it wasn't helping. Pt will complete ADHD testing soon. Pt denied any other major changes or new stressors.    Past Psychiatric hx: ATARAX/VISTARIL, CELEXA, CLONIDINE, DIPHENHYDRAMINE, EFFEXOR, ELAVIL, GABAPENTIN, REMERON, RESTORIL, TRAZODONE, WELLBUTRIN, and ZOLOFT  Current Psychotropic Medication(s): CLONIDINE prn, ATARAX/VISTARIL, EFFEXOR, and WELLBUTRIN    Past Medical hx:   Past Medical History:   Diagnosis Date    Abdominal mass, left lower quadrant 11/2016    Anxiety disorder 2010    Asthma     Bilateral ovarian cysts     BMI 30.0-30.9,adult 2/2/2023    Cancer antigen 125 () elevation 11/2016    Fatty liver 7/24/2023    Focal nodular hyperplasia of liver 7/24/2023    High cholesterol     History of hemorrhoids     Incisional abscess     right breast I/D abscess 2012    Mild episode of recurrent major depressive disorder 11/16/2017    MTHFR mutation     Recurrent major depressive disorder, in partial remission 5/31/2023    Thrombocytopenia complicating pregnancy 2010 and 2012    Vitamin D insufficiency 12/29/2022        Interim hx:  Medication changes last visit:   cross-titrated from Prozac to Zoloft 100mg Q HS and pt stopped Wellbutrin  Anxiety: mild - unchanged  Depression: moderate - variable     Denies suicidal/homicidal ideations.  Denies hopelessness/worthlessness.    Denies auditory/visual hallucinations      Alcohol: 2  years sober per her report  Drug: pt denied  Caffeine: moderate use  Tobacco: pt denied      Review of Systems   PSYCHIATRIC: Pertinent items are noted in the narrative.        CONSTITUTIONAL: weight stable    Past Medical, Family and Social History: The patient's past medical, family and social history have been reviewed and updated as appropriate within the electronic medical record. See encounter notes.     Current Psychiatric Medication:  Zoloft 100mg Q HS and Trazodone 50mg Q HS     Compliance: yes      Side effects: pt denied     Risk Parameters:  Patient reports no suicidal ideation  Patient reports no homicidal ideation  Patient reports no self-injurious behavior  Patient reports no violent behavior     Exam (detailed: at least 9 elements; comprehensive: all 15 elements)   Constitutional  Vitals:  Most recent vital signs, dated less than 90 days prior to this appointment, were reviewed. Pulse:  [71]   BP: (103)/(71)       General:  unremarkable, age appropriate, casual attire, good eye contact, good rapport       Musculoskeletal  Muscle Strength/Tone:  no flaccidity, no tremor    Gait & Station:  normal      Psychiatric                       Speech:  normal tone, normal rate, rhythm, and volume   Mood & Affect:   Depressed, anxious         Thought Process:   Goal directed; Linear    Associations:   intact   Thought Content:   No SI/HI, delusions, or paranoia, no AV/VH   Insight & Judgement:   Good, adequate to circumstances   Orientation:   grossly intact; alert and oriented x 4    Memory:  intact for content of interview    Language:  grossly intact, can repeat    Attention Span  : Grossly intact for content of interview   Fund of Knowledge:   intact and appropriate to age and level of education        Assessment and Diagnosis   Status/Progress: increasing distress     Impression: Pt struggles with recurrent depression that is not currently well managed. Pt also noted a hx of anxiety and wonders if she  struggles with ADHD. Pt is to continue in psychiatric medication management and follow through on ADHD testing.    Diagnosis: MDD, recurrent, moderate    Intervention/Counseling/Treatment Plan   Medication Management:      1. Continue Trazodone 50mg Q HS     2. Increase Zoloft dosage to 150mg Q HS     3. Continue in therapy     4. Call to report any worsening of symptoms or problems with the medication. Pt instructed to go to ER with thoughts of harming self, others    Psychotherapy:   Target symptoms: depression, anxiety, insomnia  Why chosen therapy is appropriate versus another modality: CBT used; relevant to diagnosis, patient responds to this modality  Outcome monitoring methods: self-report, observation  Therapeutic intervention type: Cognitive Behavioral Therapy  Topics discussed/themes: building skills sets for symptom management, symptom recognition, nutrition, exercise  The patient's response to the intervention is fair  Patient's response to treatment is: good.   The patient's progress toward treatment goals: increasing distress  16 minutes spent with pt in psychotherapy and 5 minutes in medication management     Return to clinic: 3 weeks or earlier PRN    -Cognitive-Behavioral/Supportive therapy and psychoeducation provided  -R/B/SE's of medications discussed with the pt who expresses understanding and chooses to take medications as prescribed.   -Pt instructed to call clinic, 911 or go to nearest emergency room if sxs worsen or pt is in   crisis. The pt expresses understanding.    Leeroy Domínguez, PhD, MP     Visit today included increased complexity associated with the care of the episodic problem associated with mental health. Addressed and managed the longitudinal care of the patient due to the serious and/or complex mental health diagnosis.

## 2025-04-02 ENCOUNTER — OFFICE VISIT (OUTPATIENT)
Dept: PSYCHIATRY | Facility: CLINIC | Age: 50
End: 2025-04-02
Payer: COMMERCIAL

## 2025-04-02 DIAGNOSIS — F41.0 GENERALIZED ANXIETY DISORDER WITH PANIC ATTACKS: Primary | ICD-10-CM

## 2025-04-02 DIAGNOSIS — F41.1 GENERALIZED ANXIETY DISORDER WITH PANIC ATTACKS: Primary | ICD-10-CM

## 2025-04-02 DIAGNOSIS — F33.1 MODERATE EPISODE OF RECURRENT MAJOR DEPRESSIVE DISORDER: ICD-10-CM

## 2025-04-02 PROCEDURE — 99999 PR PBB SHADOW E&M-EST. PATIENT-LVL II: CPT | Mod: PBBFAC,,, | Performed by: PSYCHOLOGIST

## 2025-04-02 PROCEDURE — 96139 PSYCL/NRPSYC TST TECH EA: CPT | Mod: S$GLB,,, | Performed by: PSYCHOLOGIST

## 2025-04-02 PROCEDURE — 96130 PSYCL TST EVAL PHYS/QHP 1ST: CPT | Mod: S$GLB,,, | Performed by: PSYCHOLOGIST

## 2025-04-02 PROCEDURE — 99499 UNLISTED E&M SERVICE: CPT | Mod: S$GLB,,, | Performed by: PSYCHOLOGIST

## 2025-04-02 PROCEDURE — 96131 PSYCL TST EVAL PHYS/QHP EA: CPT | Mod: S$GLB,,, | Performed by: PSYCHOLOGIST

## 2025-04-02 PROCEDURE — 96138 PSYCL/NRPSYC TECH 1ST: CPT | Mod: S$GLB,,, | Performed by: PSYCHOLOGIST

## 2025-04-02 NOTE — PROGRESS NOTES
Outpatient Psychological Consultation    Name: Ivory Cade  MRN: [id number]  : 1975    Testing appointment: 2025    ID:  Patient presents for consultation for diagnostic clarity in regard to difficulties with attention/concentration    Reason for encounter Referral from this author    Psychiatric records were reviewed prior to the diagnostic interview. Comprehensive psychological assessment will not be documented in this report rather will be focused on the referral question. See prior notes for comprehensive psychiatric work-up.    Chief Complaint: Pt is a 49 year old female presenting as a referral from this author for diagnostic clarification due to report of difficulty concentration/poor attention    Psychological Assessments Administered  Wender [place] Rating Scale for the Attention Deficit Hyperactivity Disorder  Demarcus Adult ADHD Rating Scales-IV: Other-Report, current symptoms  Krystin Continuous Performance Test 3  The Dot Counting Test    Results    Wender [place] Rating Scale for the Attention Deficit Hyperactivity Disorder  The Wender [place] Rating Scale can be used to assess adults for Attention Deficit Hyperactivity Disorder with a subset of 25 questions associated with that diagnosis. It is a retrospective diagnosis of childhood ADHD.      Wender [place] Rating Scale Subscore = 44 (sum of the 25 questions endorsed that are associated with ADHD)    Scores vary from 1-100, and the cutoff score is 46.    Given pt's report of their own symptoms of ADHD in childhood, their response style does not support a diagnosis of ADHD.    Demarcus Adult ADHD Rating Scales-IV: Other-Report, current symptoms  The BAARS-IV: Other-Report, current symptoms is a collateral measure of the patient's current symptoms of ADHD, as noted by someone with knowledge of the patient's executive functioning.    Tom Schwartz is the evaluator whose relationship to pt is her friend.     Inattention total score: 29      Hyperactivity  total score: 6       Impulsivity total score: 4       Sluggish Cognitive Tempo total score: 27    ADHD total score (sum of Inattention/Hyperactivity/Impulsivity Subsections): 39       Inattention Symptom Count: 7      Hyperactivity/Impulsivity Symptom Count: 0   ADHD Symptom Count total (sum of Inattention/Hyperactivity/Impulsivity Subsections): 7     Based on the evaluator's report of pt's symptoms, pt does often struggle with 7 symptoms of inattention but only 0 symptoms of hyperactivity/impulsivity. This supports a diagnosis of ADHD - predominately inattentive type    Krystin Continuous Performance Test 3  The Krystin Continuous Performance Test 3rd Edition (Krystin CPT 3) is an objective, task-oriented computerized assessment of attention-related problems. This measure creates an index of the respondent's performance in areas of inattentiveness, impulsivity, sustained attention, and vigilance.    Pt's performance on this objective measure does not indicate difficulties with sustained attention, hyperactivity, impulsivity, and difficulty maintaining vigilance with varying levels of stimulus frequency.    The Dot Counting Test  The Dot Counting Test (DCT) is a brief task that assesses test-taking effort in individuals.     Based on patient performance and score, pt appeared to demonstrate good effort.    Interpretation    Pt is a 49 year old female presenting as a referral from this author for diagnostic clarification due to report of difficulty concentration/poor attention. Pt reports attention/concentration concerns, however, based on the totality of the self-reported symptoms, collateral information, and objective testing, pt does not appear to meet criteria for an ADHD diagnosis.    Diagnosis     None    Plan and Recommendations    Continue Zoloft 150mg Q HS and Trazodone 50mg Q HS PRN.    Attention Tips Remember that inattention and lack of focus are major culprits to forgetting information so be sure and  practice paying attention for adequate learning of information. If you rely on passive attention to remembering something (e.g., yeah, uh-huh approach), you'll find you cannot recall it later. I recommend the following to improve attention, which may aid in later recall:   Reduce distractions as much as possible.  Look at the person as they are speaking to you.   Paraphrase as they are speaking  Write down important pieces of information   Ask people to repeat if you zone out.    Have visual cues  (posted to-do-list, daily schedule) to remind you if you need to do something later.   Processing Speed Tips Using multiple modalities (e.g., listening, writing notes, asking questions, recording) to learn new information is likely to allow additional time for processing, thus improving memory for the material.   Allowing sufficient time to complete tasks will reduce frustration and help to ensure completion.  Spend a lot of up-front time planning in advance how long a task may take and then chunk steps in the task so you don't wear yourself out.   Executive Functioning Tips: Don't attempt to multi-task.  Separate tasks so that each can be completed one at a time.  Consider using a calendar/day planner, as that may be effective to help you plan and stay on track.  Color-coding specific tasks by importance may add additional benefit to your planner.  Break down large projects into smaller tasks and write down the steps to completing the task.       BILLIN, [id number] X2 (90 minutes of testing and scoring with psychometrist), [id number], [id number] (2 hours of report writing, evaluation and feedback)

## 2025-04-04 RX ORDER — SERTRALINE HYDROCHLORIDE 100 MG/1
150 TABLET, FILM COATED ORAL NIGHTLY
Qty: 135 TABLET | Refills: 1 | Status: SHIPPED | OUTPATIENT
Start: 2025-04-04 | End: 2026-04-04

## 2025-04-30 ENCOUNTER — PATIENT MESSAGE (OUTPATIENT)
Dept: PSYCHIATRY | Facility: CLINIC | Age: 50
End: 2025-04-30
Payer: COMMERCIAL

## 2025-05-15 ENCOUNTER — OFFICE VISIT (OUTPATIENT)
Dept: FAMILY MEDICINE | Facility: CLINIC | Age: 50
End: 2025-05-15
Payer: COMMERCIAL

## 2025-05-15 VITALS
WEIGHT: 173.75 LBS | HEART RATE: 86 BPM | BODY MASS INDEX: 27.21 KG/M2 | SYSTOLIC BLOOD PRESSURE: 120 MMHG | OXYGEN SATURATION: 98 % | DIASTOLIC BLOOD PRESSURE: 74 MMHG

## 2025-05-15 DIAGNOSIS — R11.0 NAUSEA: ICD-10-CM

## 2025-05-15 DIAGNOSIS — Z12.31 ENCOUNTER FOR SCREENING MAMMOGRAM FOR MALIGNANT NEOPLASM OF BREAST: ICD-10-CM

## 2025-05-15 DIAGNOSIS — E66.3 OVERWEIGHT (BMI 25.0-29.9): Primary | ICD-10-CM

## 2025-05-15 PROCEDURE — 99999 PR PBB SHADOW E&M-EST. PATIENT-LVL III: CPT | Mod: PBBFAC,,, | Performed by: INTERNAL MEDICINE

## 2025-05-15 RX ORDER — ONDANSETRON 4 MG/1
4 TABLET, ORALLY DISINTEGRATING ORAL 2 TIMES DAILY
Qty: 30 TABLET | Refills: 0 | Status: SHIPPED | OUTPATIENT
Start: 2025-05-15

## 2025-05-15 RX ORDER — TIRZEPATIDE 7.5 MG/.5ML
7.5 INJECTION, SOLUTION SUBCUTANEOUS
Qty: 2 ML | Refills: 0 | Status: SHIPPED | OUTPATIENT
Start: 2025-05-15

## 2025-05-15 NOTE — PROGRESS NOTES
Assessment and Plan:    1. Overweight (BMI 25.0-29.9) (Primary)  - tirzepatide, weight loss, (ZEPBOUND) 7.5 mg/0.5 mL Soln; Inject 7.5 mg into the skin every 7 days.  Dispense: 2 mL; Refill: 0    2. Nausea  - ondansetron (ZOFRAN-ODT) 4 MG TbDL; Take 1 tablet (4 mg total) by mouth 2 (two) times daily.  Dispense: 30 tablet; Refill: 0    3. Encounter for screening mammogram for malignant neoplasm of breast  - Mammo Digital Screening Bilat w/ Iain (XPD); Future    ______________________________________________________________________    Subjective:    Chief Complaint:  Follow-up    HPI:  Ivory is a 49 y.o. year old patient here for follow-up    She has been taking Zepbound for the past two months and it has been effective so far.  She has lost more than 10 lb.  She is not having any significant side effects with this other than mild nausea occasionally.  Requesting Zofran for this.  She would like to go up on the dose as originally planned.    She is due for a mammogram.    She is on compounded HRT with Dr. Jaeger.     She is no longer using any nicotine products and is not drinking any alcohol.    She has been working on trying to exercise more.    Past Medical History:  Past Medical History:   Diagnosis Date    Abdominal mass, left lower quadrant 11/2016    Anxiety disorder 2010    Asthma     Bilateral ovarian cysts     BMI 30.0-30.9,adult 2/2/2023    Cancer antigen 125 () elevation 11/2016    Fatty liver 7/24/2023    Focal nodular hyperplasia of liver 7/24/2023    High cholesterol     History of hemorrhoids     Incisional abscess     right breast I/D abscess 2012    Mild episode of recurrent major depressive disorder 11/16/2017    MTHFR mutation     Recurrent major depressive disorder, in partial remission 5/31/2023    Thrombocytopenia complicating pregnancy 2010 and 2012    Vitamin D insufficiency 12/29/2022       Past Surgical History:  Past Surgical History:   Procedure Laterality Date    BREAST BIOPSY Right  2011    BREAST CYST INCISION AND DRAINAGE      right breast abscess I/D 2012    COLONOSCOPY  2014    COLONOSCOPY N/A 10/28/2021    Procedure: COLONOSCOPY;  Surgeon: Jeremy Wheatley Jr., MD;  Location: [facility];  Service: Endoscopy;  Laterality: N/A;    essure  2012    HYSTERECTOMY  2016    Partial - left ovary removed only    OOPHORECTOMY Left 2016       Family History:  Family History   Problem Relation Name Age of Onset    Celiac disease Maternal Aunt      Hepatitis Maternal Aunt          autoimmune    Colon cancer Maternal Grandmother      Cancer Maternal Grandmother      Hepatitis Mother          autoimmune    Hypertension Mother      Pulmonary embolism Mother      Hypertension Father      Hyperlipidemia Father      Heart disease Father      Early death Father  53        MI    No Known Problems Brother      Breast cancer Neg Hx      Diabetes Neg Hx      Miscarriages / Stillbirths Neg Hx      Ovarian cancer Neg Hx      Stroke Neg Hx         Social History:  Social History     Socioeconomic History    Marital status: Single    Number of children: 2   Tobacco Use    Smoking status: Every Day     Types: Vaping with nicotine     Passive exposure: Never    Smokeless tobacco: Never    Tobacco comments:     on wellbutrin, smokes 3 cigs per week   Substance and Sexual Activity    Alcohol use: Yes     Alcohol/week: 10.0 standard drinks of alcohol     Types: 10 Glasses of wine per week    Drug use: No    Sexual activity: Yes     Partners: Male   Social History Narrative         Social Drivers of Health     Financial Resource Strain: Low Risk  (3/13/2025)    Overall Financial Resource Strain (CARDIA)     Difficulty of Paying Living Expenses: Not hard at all   Food Insecurity: No Food Insecurity (3/13/2025)    Hunger Vital Sign     Worried About Running Out of Food in the Last Year: Never true     Ran Out of Food in the Last Year: Never true   Transportation Needs: No Transportation Needs (3/13/2025)    PRAPARE -  Transportation     Lack of Transportation (Medical): No     Lack of Transportation (Non-Medical): No   Physical Activity: Inactive (3/13/2025)    Exercise Vital Sign     Days of Exercise per Week: 0 days     Minutes of Exercise per Session: 0 min   Stress: Stress Concern Present (3/13/2025)    [employer] - Occupational Stress Questionnaire     Feeling of Stress : To some extent   Housing Stability: Low Risk  (3/13/2025)    Housing Stability Vital Sign     Unable to Pay for Housing in the Last Year: No     Number of Times Moved in the Last Year: 0     Homeless in the Last Year: No       Medications:  Medications Ordered Prior to Encounter[1]    Allergies:  Penicillins    Immunizations:  Immunization History   Administered Date(s) Administered    COVID-19, MRNA, LN-S, PF (MODERNA FULL 0.5 ML DOSE) 12/28/2020, 01/25/2021    COVID-19, MRNA, LN-S, PF (Pfizer) (Purple Cap) 12/01/2021    Influenza 10/28/2018, 10/06/2019, 12/27/2021, 11/13/2024    Influenza - Quadrivalent 12/17/2021    Influenza - Quadrivalent - PF *Preferred* (6 months and older) 10/06/2020, 12/17/2021, 08/31/2022, 10/12/2023    Influenza - Trivalent - Afluria, Fluzone MDV 11/24/2014    Pneumococcal Conjugate - 20 Valent 08/24/2022    Pneumococcal Polysaccharide - 23 Valent 01/26/2017    Tdap 03/29/2012, 08/24/2022       Review of Systems:  Review of Systems   Constitutional:  Negative for activity change and appetite change.   Respiratory:  Negative for shortness of breath and wheezing.    Cardiovascular:  Negative for chest pain and palpitations.   Gastrointestinal:  Negative for diarrhea, nausea and vomiting.   Neurological:  Negative for seizures, syncope and headaches.   Psychiatric/Behavioral:  Negative for agitation, decreased concentration, dysphoric mood and sleep disturbance. The patient is not nervous/anxious.        Objective:    Vitals:  Vitals:    05/15/25 1428   BP: 120/74   Pulse: 86   SpO2: 98%   Weight: 78.8  kg (173 lb 11.6 oz)   PainSc: 0-No pain       Physical Exam  Vitals reviewed.   Constitutional:       General: She is not in acute distress.     Appearance: She is well-developed.   Eyes:      General: No scleral icterus.  Cardiovascular:      Rate and Rhythm: Normal rate and regular rhythm.      Heart sounds: No murmur heard.  Pulmonary:      Effort: Pulmonary effort is normal. No respiratory distress.      Breath sounds: Normal breath sounds. No wheezing, rhonchi or rales.   Musculoskeletal:      Right lower leg: No edema.      Left lower leg: No edema.   Skin:     General: Skin is warm and dry.   Neurological:      Mental Status: She is alert and oriented to person, place, and time.   Psychiatric:         Behavior: Behavior normal.         Data:  LDL 68 when she was previously on Crestor.  She has been off of this for two months.    Didi Conteh MD  Internal Medicine             [1]   Current Outpatient Medications on File Prior to Visit   Medication Sig Dispense Refill    albuterol (PROVENTIL/VENTOLIN HFA) 90 mcg/actuation inhaler Inhale 2 puffs into the lungs every 6 (six) hours as needed for Wheezing. Rescue 18 g 11    estradioL (VIVELLE-DOT) 0.1 mg/24 hr PTSW Place 1 patch onto the skin twice a week.      ibuprofen (ADVIL,MOTRIN) 800 MG tablet TAKE 1 TABLET BY MOUTH EVERY 8 HOURS AS NEEDED FOR PAIN 30 tablet 0    levothyroxine (SYNTHROID) 50 MCG tablet TAKE 1 TABLET BY MOUTH EVERY DAY BEFORE BREAKFAST 90 tablet 3    progesterone (PROMETRIUM) 200 MG capsule Take 200 mg by mouth every evening.      sertraline (ZOLOFT) 100 MG tablet TAKE 1.5 TABLETS (150 MG TOTAL) BY MOUTH EVERY EVENING. 135 tablet 1    tirzepatide, weight loss, (ZEPBOUND) 5 mg/0.5 mL Soln Inject 0.5 mLs (5 mg total) into the skin every 7 days. 2 mL 0    traZODone (DESYREL) 50 MG tablet Take 1 tablet (50 mg total) by mouth every evening. 90 tablet 1    UNABLE TO FIND nightly. Testosterone Cream 1 %      valACYclovir (VALTREX) 1000 MG tablet Take  2 tablets (2,000 mg total) by mouth 2 (two) times daily. for 1 day 8 tablet 5     No current facility-administered medications on file prior to visit.

## 2025-06-03 DIAGNOSIS — E66.3 OVERWEIGHT (BMI 25.0-29.9): ICD-10-CM

## 2025-06-03 RX ORDER — TIRZEPATIDE 7.5 MG/.5ML
INJECTION, SOLUTION SUBCUTANEOUS
Qty: 2 ML | Refills: 0 | Status: SHIPPED | OUTPATIENT
Start: 2025-06-03

## 2025-06-25 DIAGNOSIS — E66.3 OVERWEIGHT (BMI 25.0-29.9): ICD-10-CM

## 2025-07-02 RX ORDER — TIRZEPATIDE 7.5 MG/.5ML
INJECTION, SOLUTION SUBCUTANEOUS
Qty: 2 ML | Refills: 0 | Status: SHIPPED | OUTPATIENT
Start: 2025-07-02

## 2025-07-03 ENCOUNTER — PATIENT MESSAGE (OUTPATIENT)
Dept: PSYCHIATRY | Facility: CLINIC | Age: 50
End: 2025-07-03
Payer: COMMERCIAL

## 2025-07-07 DIAGNOSIS — F33.42 RECURRENT MAJOR DEPRESSIVE DISORDER, IN FULL REMISSION: ICD-10-CM

## 2025-07-07 DIAGNOSIS — F41.0 GENERALIZED ANXIETY DISORDER WITH PANIC ATTACKS: ICD-10-CM

## 2025-07-07 DIAGNOSIS — G47.00 INSOMNIA, UNSPECIFIED TYPE: ICD-10-CM

## 2025-07-07 DIAGNOSIS — F41.1 GENERALIZED ANXIETY DISORDER WITH PANIC ATTACKS: ICD-10-CM

## 2025-07-07 RX ORDER — TRAZODONE HYDROCHLORIDE 50 MG/1
50 TABLET ORAL NIGHTLY PRN
Qty: 90 TABLET | Refills: 1 | Status: SHIPPED | OUTPATIENT
Start: 2025-07-07 | End: 2026-01-03

## 2025-07-14 ENCOUNTER — PATIENT MESSAGE (OUTPATIENT)
Dept: PSYCHIATRY | Facility: CLINIC | Age: 50
End: 2025-07-14
Payer: COMMERCIAL

## 2025-07-16 ENCOUNTER — PATIENT MESSAGE (OUTPATIENT)
Dept: PSYCHIATRY | Facility: CLINIC | Age: 50
End: 2025-07-16
Payer: COMMERCIAL

## 2025-07-16 ENCOUNTER — OFFICE VISIT (OUTPATIENT)
Dept: PSYCHIATRY | Facility: CLINIC | Age: 50
End: 2025-07-16
Payer: COMMERCIAL

## 2025-07-16 DIAGNOSIS — F41.1 GENERALIZED ANXIETY DISORDER WITH PANIC ATTACKS: Primary | ICD-10-CM

## 2025-07-16 DIAGNOSIS — F41.0 GENERALIZED ANXIETY DISORDER WITH PANIC ATTACKS: Primary | ICD-10-CM

## 2025-07-16 DIAGNOSIS — F33.1 MODERATE EPISODE OF RECURRENT MAJOR DEPRESSIVE DISORDER: ICD-10-CM

## 2025-07-16 PROCEDURE — G2211 COMPLEX E/M VISIT ADD ON: HCPCS | Mod: 95,,, | Performed by: PSYCHOLOGIST

## 2025-07-16 PROCEDURE — 1159F MED LIST DOCD IN RCRD: CPT | Mod: CPTII,95,, | Performed by: PSYCHOLOGIST

## 2025-07-16 PROCEDURE — 3044F HG A1C LEVEL LT 7.0%: CPT | Mod: CPTII,95,, | Performed by: PSYCHOLOGIST

## 2025-07-16 PROCEDURE — 98006 SYNCH AUDIO-VIDEO EST MOD 30: CPT | Mod: 95,,, | Performed by: PSYCHOLOGIST

## 2025-07-16 RX ORDER — DULOXETIN HYDROCHLORIDE 60 MG/1
60 CAPSULE, DELAYED RELEASE ORAL DAILY
Qty: 30 CAPSULE | Refills: 2 | Status: SHIPPED | OUTPATIENT
Start: 2025-07-24 | End: 2026-07-24

## 2025-07-16 RX ORDER — DULOXETIN HYDROCHLORIDE 20 MG/1
CAPSULE, DELAYED RELEASE ORAL
Qty: 12 CAPSULE | Refills: 0 | Status: SHIPPED | OUTPATIENT
Start: 2025-07-16

## 2025-07-16 NOTE — PROGRESS NOTES
The patient location is: [place], [place]  The chief complaint leading to consultation is: depression, anxiety  Visit type: Virtual visit with synchronous audio and video  Each patient to whom he or she provides medical services by telemedicine is:  (1) informed of the relationship between the physician and patient and the respective role of any other health care provider with respect to management of the patient; and (2) notified that he or she may decline to receive medical services by telemedicine and may withdraw from such care at any time.  Face-to-Face time: 11 minutes    Notes:    Outpatient Psychiatry Follow-Up Visit    Clinical Status of Patient: Outpatient (Ambulatory)  07/16/2025     Chief Complaint: 50 year old female presenting today for a follow-up.       Interval History and Content of Current Session:  Interim Events/Subjective Report/Content of Current Session:  follow-up appointment.    Pt is a 50 year old female with past psychiatric hx of depression, anxiety who presents for follow-up treatment. Pt feels as if Zoloft is helpful for her mood but her major complaint is low energy. Pt comes to this appt as she is struggling with neuropathic pain in her feet and so would like to switch to Cymbalta instead of starting Gabapentin. We discussed the possible side effects and risks. Pt denied any other major changes or new stressors.    Past Psychiatric hx: ATARAX/VISTARIL, CELEXA, CLONIDINE, DIPHENHYDRAMINE, EFFEXOR, ELAVIL, GABAPENTIN, REMERON, RESTORIL, TRAZODONE, WELLBUTRIN, and ZOLOFT  Current Psychotropic Medication(s): CLONIDINE prn, ATARAX/VISTARIL, EFFEXOR, and WELLBUTRIN    Past Medical hx:   Past Medical History:   Diagnosis Date    Abdominal mass, left lower quadrant 11/2016    Anxiety disorder 2010    Asthma     Bilateral ovarian cysts     BMI 30.0-30.9,adult 2/2/2023    Cancer antigen 125 () elevation 11/2016    Fatty liver 7/24/2023    Focal nodular hyperplasia of liver  7/24/2023    High cholesterol     History of hemorrhoids     Incisional abscess     right breast I/D abscess 2012    Mild episode of recurrent major depressive disorder 11/16/2017    MTHFR mutation     Recurrent major depressive disorder, in partial remission 5/31/2023    Thrombocytopenia complicating pregnancy 2010 and 2012    Vitamin D insufficiency 12/29/2022        Interim hx:  Medication changes last visit:   none  Anxiety: mild - unchanged  Depression: moderate - variable     Denies suicidal/homicidal ideations.  Denies hopelessness/worthlessness.    Denies auditory/visual hallucinations      Alcohol: 2 years sober per her report  Drug: pt denied  Caffeine: moderate use  Tobacco: pt denied      Review of Systems   PSYCHIATRIC: Pertinent items are noted in the narrative.        CONSTITUTIONAL: weight stable    Past Medical, Family and Social History: The patient's past medical, family and social history have been reviewed and updated as appropriate within the electronic medical record. See encounter notes.     Current Psychiatric Medication:  Zoloft 150mg Q HS and Trazodone 50mg Q HS     Compliance: yes      Side effects: pt denied     Risk Parameters:  Patient reports no suicidal ideation  Patient reports no homicidal ideation  Patient reports no self-injurious behavior  Patient reports no violent behavior     Exam (detailed: at least 9 elements; comprehensive: all 15 elements)   Constitutional  Vitals:  Most recent vital signs, dated less than 90 days prior to this appointment, were reviewed. BP: ()/()   Arterial Line BP: ()/()       General:  unremarkable, age appropriate, casual attire, good eye contact, good rapport       Musculoskeletal  Muscle Strength/Tone:  no flaccidity, no tremor    Gait & Station:  normal      Psychiatric                       Speech:  normal tone, normal rate, rhythm, and volume   Mood & Affect:   Depressed, anxious         Thought Process:   Goal directed; Linear    Associations:    intact   Thought Content:   No SI/HI, delusions, or paranoia, no AV/VH   Insight & Judgement:   Good, adequate to circumstances   Orientation:   grossly intact; alert and oriented x 4    Memory:  intact for content of interview    Language:  grossly intact, can repeat    Attention Span  : Grossly intact for content of interview   Fund of Knowledge:   intact and appropriate to age and level of education        Assessment and Diagnosis   Status/Progress: increasing distress     Impression: Pt struggles with recurrent depression that is not currently well managed. Pt also noted a hx of anxiety and wonders if she struggles with ADHD. Pt is to continue in psychiatric medication management and follow through on ADHD testing.    Diagnosis: MDD, recurrent, moderate    Intervention/Counseling/Treatment Plan   Medication Management:      1. Continue Trazodone 50mg Q HS     2. Take Zoloft 100mg at bedtime and Cymbalta 20mg once daily for four days. Then take Zoloft 50mg and Cymbalta 20mg twice daily for four days. Then stop Zoloft and take Cymbalta 60mg once daily.     3. Continue in therapy     4. Call to report any worsening of symptoms or problems with the medication. Pt instructed to go to ER with thoughts of harming self, others    Psychotherapy:   Target symptoms: depression, anxiety, insomnia  Why chosen therapy is appropriate versus another modality: CBT used; relevant to diagnosis, patient responds to this modality  Outcome monitoring methods: self-report, observation  Therapeutic intervention type: Cognitive Behavioral Therapy  Topics discussed/themes: building skills sets for symptom management, symptom recognition, nutrition, exercise  The patient's response to the intervention is fair  Patient's response to treatment is: good.   The patient's progress toward treatment goals: increasing distress  16 minutes spent with pt in psychotherapy and 5 minutes in medication management     Return to clinic: 3 weeks or  earlier PRN    -Cognitive-Behavioral/Supportive therapy and psychoeducation provided  -R/B/SE's of medications discussed with the pt who expresses understanding and chooses to take medications as prescribed.   -Pt instructed to call clinic, 911 or go to nearest emergency room if sxs worsen or pt is in   crisis. The pt expresses understanding.    Leeroy Domínguez, PhD, MP     Visit today included increased complexity associated with the care of the episodic problem associated with mental health. Addressed and managed the longitudinal care of the patient due to the serious and/or complex mental health diagnosis.

## 2025-07-23 DIAGNOSIS — E66.3 OVERWEIGHT (BMI 25.0-29.9): ICD-10-CM

## 2025-07-25 RX ORDER — TIRZEPATIDE 7.5 MG/.5ML
INJECTION, SOLUTION SUBCUTANEOUS
Qty: 2 ML | Refills: 0 | Status: SHIPPED | OUTPATIENT
Start: 2025-07-25

## 2025-08-14 ENCOUNTER — PATIENT MESSAGE (OUTPATIENT)
Dept: PSYCHIATRY | Facility: CLINIC | Age: 50
End: 2025-08-14
Payer: COMMERCIAL

## 2025-08-15 ENCOUNTER — OFFICE VISIT (OUTPATIENT)
Dept: PSYCHIATRY | Facility: CLINIC | Age: 50
End: 2025-08-15
Payer: COMMERCIAL

## 2025-08-15 ENCOUNTER — PATIENT MESSAGE (OUTPATIENT)
Dept: FAMILY MEDICINE | Facility: CLINIC | Age: 50
End: 2025-08-15
Payer: COMMERCIAL

## 2025-08-15 DIAGNOSIS — F33.42 RECURRENT MAJOR DEPRESSIVE DISORDER, IN FULL REMISSION: ICD-10-CM

## 2025-08-15 DIAGNOSIS — F41.0 GENERALIZED ANXIETY DISORDER WITH PANIC ATTACKS: ICD-10-CM

## 2025-08-15 DIAGNOSIS — G47.00 INSOMNIA, UNSPECIFIED TYPE: ICD-10-CM

## 2025-08-15 DIAGNOSIS — F33.1 MODERATE EPISODE OF RECURRENT MAJOR DEPRESSIVE DISORDER: Primary | ICD-10-CM

## 2025-08-15 DIAGNOSIS — F41.1 GENERALIZED ANXIETY DISORDER WITH PANIC ATTACKS: ICD-10-CM

## 2025-08-15 RX ORDER — BUPROPION HYDROCHLORIDE 150 MG/1
150 TABLET ORAL DAILY
Qty: 30 TABLET | Refills: 0 | Status: SHIPPED | OUTPATIENT
Start: 2025-08-15 | End: 2026-08-15

## 2025-08-15 RX ORDER — DULOXETIN HYDROCHLORIDE 60 MG/1
60 CAPSULE, DELAYED RELEASE ORAL DAILY
Qty: 90 CAPSULE | Refills: 1 | Status: SHIPPED | OUTPATIENT
Start: 2025-08-15 | End: 2026-08-15

## 2025-08-15 RX ORDER — TRAZODONE HYDROCHLORIDE 50 MG/1
50 TABLET ORAL NIGHTLY PRN
Qty: 90 TABLET | Refills: 1 | Status: SHIPPED | OUTPATIENT
Start: 2025-08-15 | End: 2026-02-11

## 2025-08-18 DIAGNOSIS — E66.3 OVERWEIGHT (BMI 25.0-29.9): ICD-10-CM

## 2025-08-18 RX ORDER — TIRZEPATIDE 7.5 MG/.5ML
INJECTION, SOLUTION SUBCUTANEOUS
Qty: 9 ML | Refills: 0 | Status: SHIPPED | OUTPATIENT
Start: 2025-08-18